# Patient Record
Sex: MALE | Race: BLACK OR AFRICAN AMERICAN | Employment: FULL TIME | URBAN - METROPOLITAN AREA
[De-identification: names, ages, dates, MRNs, and addresses within clinical notes are randomized per-mention and may not be internally consistent; named-entity substitution may affect disease eponyms.]

---

## 2018-02-17 ENCOUNTER — APPOINTMENT (OUTPATIENT)
Dept: GENERAL RADIOLOGY | Facility: HOSPITAL | Age: 33
End: 2018-02-17
Attending: EMERGENCY MEDICINE
Payer: COMMERCIAL

## 2018-02-17 ENCOUNTER — HOSPITAL ENCOUNTER (EMERGENCY)
Facility: HOSPITAL | Age: 33
Discharge: HOME | End: 2018-02-17
Attending: EMERGENCY MEDICINE
Payer: COMMERCIAL

## 2018-02-17 VITALS
DIASTOLIC BLOOD PRESSURE: 74 MMHG | RESPIRATION RATE: 22 BRPM | TEMPERATURE: 98 F | WEIGHT: 263 LBS | HEIGHT: 71 IN | OXYGEN SATURATION: 96 % | BODY MASS INDEX: 36.82 KG/M2 | SYSTOLIC BLOOD PRESSURE: 122 MMHG | HEART RATE: 67 BPM

## 2018-02-17 DIAGNOSIS — R07.89 CHEST PAIN, ATYPICAL: ICD-10-CM

## 2018-02-17 DIAGNOSIS — K21.9 GASTROESOPHAGEAL REFLUX DISEASE, ESOPHAGITIS PRESENCE NOT SPECIFIED: ICD-10-CM

## 2018-02-17 DIAGNOSIS — K52.9 GASTROENTERITIS: Primary | ICD-10-CM

## 2018-02-17 LAB
ALBUMIN SERPL BCP-MCNC: 3.9 G/DL (ref 3.5–4.8)
ALP SERPL-CCNC: 61 U/L (ref 32–100)
ALT SERPL-CCNC: 22 U/L (ref 17–63)
ANION GAP SERPL CALC-SCNC: 10 MMOL/L (ref 0–18)
AST SERPL-CCNC: 25 U/L (ref 15–41)
BASOPHILS # BLD: 0.1 K/UL (ref 0–0.2)
BASOPHILS NFR BLD: 1 %
BILIRUB DIRECT SERPL-MCNC: 0.1 MG/DL (ref 0–0.2)
BILIRUB SERPL-MCNC: 0.6 MG/DL (ref 0.3–1.2)
BUN SERPL-MCNC: 12 MG/DL (ref 8–20)
BUN/CREAT SERPL: 11.1 (ref 10–20)
CALCIUM SERPL-MCNC: 8.9 MG/DL (ref 8.5–10.5)
CHLORIDE SERPL-SCNC: 106 MMOL/L (ref 95–110)
CO2 SERPL-SCNC: 21 MMOL/L (ref 22–32)
CREAT SERPL-MCNC: 1.08 MG/DL (ref 0.5–1.5)
EOSINOPHIL # BLD: 0.2 K/UL (ref 0–0.7)
EOSINOPHIL NFR BLD: 2 %
ERYTHROCYTE [DISTWIDTH] IN BLOOD BY AUTOMATED COUNT: 13.4 % (ref 11–15)
GLUCOSE SERPL-MCNC: 96 MG/DL (ref 70–99)
HCT VFR BLD AUTO: 47.1 % (ref 41–52)
HGB BLD-MCNC: 15.9 G/DL (ref 13.5–17.5)
LIPASE SERPL-CCNC: 21 U/L (ref 22–51)
LYMPHOCYTES # BLD: 1.1 K/UL (ref 1–4)
LYMPHOCYTES NFR BLD: 11 %
MCH RBC QN AUTO: 30.3 PG (ref 27–32)
MCHC RBC AUTO-ENTMCNC: 33.6 G/DL (ref 32–37)
MCV RBC AUTO: 90 FL (ref 80–100)
MONOCYTES # BLD: 0.7 K/UL (ref 0–1)
MONOCYTES NFR BLD: 7 %
NEUTROPHILS # BLD AUTO: 8.3 K/UL (ref 1.8–7.7)
NEUTROPHILS NFR BLD: 80 %
OSMOLALITY UR CALC.SUM OF ELEC: 284 MOSM/KG (ref 275–295)
PLATELET # BLD AUTO: 236 K/UL (ref 140–400)
PMV BLD AUTO: 8.1 FL (ref 7.4–10.3)
POTASSIUM SERPL-SCNC: 3.8 MMOL/L (ref 3.3–5.1)
PROT SERPL-MCNC: 6.7 G/DL (ref 5.9–8.4)
RBC # BLD AUTO: 5.24 M/UL (ref 4.5–5.9)
SODIUM SERPL-SCNC: 137 MMOL/L (ref 136–144)
TROPONIN I SERPL-MCNC: 0 NG/ML (ref ?–0.03)
TROPONIN I SERPL-MCNC: 0 NG/ML (ref ?–0.03)
WBC # BLD AUTO: 10.4 K/UL (ref 4–11)

## 2018-02-17 PROCEDURE — 93005 ELECTROCARDIOGRAM TRACING: CPT

## 2018-02-17 PROCEDURE — 83690 ASSAY OF LIPASE: CPT | Performed by: EMERGENCY MEDICINE

## 2018-02-17 PROCEDURE — 99285 EMERGENCY DEPT VISIT HI MDM: CPT

## 2018-02-17 PROCEDURE — 71046 X-RAY EXAM CHEST 2 VIEWS: CPT | Performed by: EMERGENCY MEDICINE

## 2018-02-17 PROCEDURE — 96360 HYDRATION IV INFUSION INIT: CPT

## 2018-02-17 PROCEDURE — 80076 HEPATIC FUNCTION PANEL: CPT | Performed by: EMERGENCY MEDICINE

## 2018-02-17 PROCEDURE — 93010 ELECTROCARDIOGRAM REPORT: CPT | Performed by: EMERGENCY MEDICINE

## 2018-02-17 PROCEDURE — 96361 HYDRATE IV INFUSION ADD-ON: CPT

## 2018-02-17 PROCEDURE — 84484 ASSAY OF TROPONIN QUANT: CPT | Performed by: EMERGENCY MEDICINE

## 2018-02-17 PROCEDURE — 85025 COMPLETE CBC W/AUTO DIFF WBC: CPT | Performed by: EMERGENCY MEDICINE

## 2018-02-17 PROCEDURE — 80048 BASIC METABOLIC PNL TOTAL CA: CPT | Performed by: EMERGENCY MEDICINE

## 2018-02-17 NOTE — ED INITIAL ASSESSMENT (HPI)
C/o mid upper abd pain that radiates to the left upper chest area on/off x 3 wks. Since last night the \"sharp/poking\" pain has gotten worse. Denies sob. But does c/o nausea with several episodes of vomiting.

## 2018-02-17 NOTE — ED PROVIDER NOTES
Patient Seen in: [facility] AND [facility] Emergency Department    History   Patient presents with:  Abdomen/Flank Pain (GI/)    Stated Complaint: ABD pain    HPI    The patient is a 25-year-old male who presents with 2 weeks of intermittent diarrhea.   Francisco flores motion. Neck supple. No JVD present. Cardiovascular: Normal rate, regular rhythm, normal heart sounds and intact distal pulses. No murmur heard. Pulmonary/Chest: Effort normal and breath sounds normal.   Abdominal: Soft.  Bowel sounds are normal. He e right bundle branch block  Axis: Left           ED Course as of Feb 17 1303  ------------------------------------------------------------       [facility]     Pulse Ox: 96%, Normal, room air    Cardiac Monitor: Pulse Readings from Last 1 Encounters:  02/17/18 : 7

## 2018-08-13 ENCOUNTER — HOSPITAL ENCOUNTER (EMERGENCY)
Facility: HOSPITAL | Age: 33
Discharge: HOME | End: 2018-08-13
Attending: EMERGENCY MEDICINE
Payer: COMMERCIAL

## 2018-08-13 VITALS
RESPIRATION RATE: 14 BRPM | BODY MASS INDEX: 50.06 KG/M2 | OXYGEN SATURATION: 99 % | DIASTOLIC BLOOD PRESSURE: 78 MMHG | WEIGHT: 255 LBS | SYSTOLIC BLOOD PRESSURE: 119 MMHG | TEMPERATURE: 98 F | HEART RATE: 91 BPM | HEIGHT: 60 IN

## 2018-08-13 DIAGNOSIS — L08.9 BACTERIAL SKIN INFECTION: ICD-10-CM

## 2018-08-13 DIAGNOSIS — B96.89 BACTERIAL SKIN INFECTION: ICD-10-CM

## 2018-08-13 DIAGNOSIS — L91.0 KELOID SCAR OF SKIN: Primary | ICD-10-CM

## 2018-08-13 PROCEDURE — 99283 EMERGENCY DEPT VISIT LOW MDM: CPT

## 2018-08-13 PROCEDURE — 93010 ELECTROCARDIOGRAM REPORT: CPT | Performed by: EMERGENCY MEDICINE

## 2018-08-13 PROCEDURE — 93005 ELECTROCARDIOGRAM TRACING: CPT

## 2018-08-13 RX ORDER — ALBUTEROL SULFATE 90 UG/1
AEROSOL, METERED RESPIRATORY (INHALATION) EVERY 6 HOURS PRN
COMMUNITY
End: 2019-12-16

## 2018-08-13 RX ORDER — DOXYCYCLINE HYCLATE 100 MG/1
100 CAPSULE ORAL 2 TIMES DAILY
Qty: 20 CAPSULE | Refills: 0 | Status: SHIPPED | OUTPATIENT
Start: 2018-08-13 | End: 2018-08-23

## 2018-08-13 RX ORDER — PREDNISONE 20 MG/1
20 TABLET ORAL DAILY
COMMUNITY
End: 2019-12-16

## 2018-08-13 RX ORDER — IPRATROPIUM BROMIDE AND ALBUTEROL SULFATE 2.5; .5 MG/3ML; MG/3ML
3 SOLUTION RESPIRATORY (INHALATION) ONCE
Status: DISCONTINUED | OUTPATIENT
Start: 2018-08-13 | End: 2018-08-13

## 2018-08-13 NOTE — ED PROVIDER NOTES
Patient Seen in: [facility] AND [facility] Emergency Department    History   Patient presents with:  Rash Skin Problem (integumentary)    Stated Complaint:     HPI    78-year-old male presents for complaint of a growth on his chest.  This is been present for the Nursing note and vitals reviewed. ED Course   Labs Reviewed - No data to display  EKG    Rate, intervals and axes as noted on EKG Report. Rate: 78  Rhythm: Sinus Rhythm  Reading: RBBB, no stemi, no change from previous.             ED Course as [place] [id number] [phone]      Dermatologist         Medications Prescribed:  Current Discharge Medication List    START taking these medications    Doxycycline Hyclate 100 MG Oral Cap  Take 1 capsule (100 mg total) by mouth 2 (two) times daily.

## 2018-12-17 ENCOUNTER — HOSPITAL ENCOUNTER (EMERGENCY)
Facility: HOSPITAL | Age: 33
Discharge: HOME | End: 2018-12-17
Attending: EMERGENCY MEDICINE
Payer: COMMERCIAL

## 2018-12-17 ENCOUNTER — APPOINTMENT (OUTPATIENT)
Dept: GENERAL RADIOLOGY | Facility: HOSPITAL | Age: 33
End: 2018-12-17
Attending: EMERGENCY MEDICINE
Payer: COMMERCIAL

## 2018-12-17 VITALS
WEIGHT: 265 LBS | RESPIRATION RATE: 18 BRPM | TEMPERATURE: 99 F | DIASTOLIC BLOOD PRESSURE: 69 MMHG | HEART RATE: 80 BPM | BODY MASS INDEX: 37.1 KG/M2 | OXYGEN SATURATION: 97 % | HEIGHT: 71 IN | SYSTOLIC BLOOD PRESSURE: 130 MMHG

## 2018-12-17 DIAGNOSIS — S82.892A CLOSED AVULSION FRACTURE OF LEFT ANKLE, INITIAL ENCOUNTER: Primary | ICD-10-CM

## 2018-12-17 PROCEDURE — 99284 EMERGENCY DEPT VISIT MOD MDM: CPT

## 2018-12-17 PROCEDURE — 73610 X-RAY EXAM OF ANKLE: CPT | Performed by: EMERGENCY MEDICINE

## 2018-12-17 RX ORDER — HYDROCODONE BITARTRATE AND ACETAMINOPHEN 5; 325 MG/1; MG/1
1-2 TABLET ORAL EVERY 4 HOURS PRN
Qty: 15 TABLET | Refills: 0 | Status: SHIPPED | OUTPATIENT
Start: 2018-12-17 | End: 2018-12-24

## 2018-12-17 RX ORDER — HYDROCODONE BITARTRATE AND ACETAMINOPHEN 5; 325 MG/1; MG/1
1 TABLET ORAL ONCE
Status: COMPLETED | OUTPATIENT
Start: 2018-12-17 | End: 2018-12-17

## 2018-12-17 NOTE — ED PROVIDER NOTES
Patient Seen in: [facility] AND [facility] Emergency Department    History   Patient presents with:  Lower Extremity Injury (musculoskeletal)    Stated Complaint:  fell off porch and hurt left leg    HPI    77-year-old male with history of hyperlipidemia here wi Temp 98.7 °F (37.1 °C)   Temp src Oral   SpO2 95 %   O2 Device None (Room air)       Current:/87   Pulse 88   Temp 98.7 °F (37.1 °C) (Oral)   Resp 18   Ht 180.3 cm (5' 11\")   Wt 120.2 kg   SpO2 95%   BMI 36.96 kg/m²         Physical Exam   Constit represent tiny avulsion fractures. There is overlying soft tissue swelling.    -Well-corticated ossicle posterior to the talus could represent an os trigonum or old fracture. The results were faxed/finalized only at 0326 hrs ET.  If you would like to contusion.           Disposition and Plan     Clinical Impression:  Closed avulsion fracture of left ankle, initial encounter  (primary encounter diagnosis)    Disposition:  Discharge  12/17/2018  2:30 am    Follow-up:  Toshia August MD  [address] [place] [address]

## 2019-04-11 ENCOUNTER — HOSPITAL ENCOUNTER (EMERGENCY)
Facility: HOSPITAL | Age: 34
Discharge: HOME | End: 2019-04-11
Payer: COMMERCIAL

## 2019-04-11 VITALS
BODY MASS INDEX: 36.4 KG/M2 | HEIGHT: 71 IN | TEMPERATURE: 98 F | RESPIRATION RATE: 18 BRPM | DIASTOLIC BLOOD PRESSURE: 76 MMHG | WEIGHT: 260 LBS | HEART RATE: 108 BPM | SYSTOLIC BLOOD PRESSURE: 148 MMHG | OXYGEN SATURATION: 94 %

## 2019-04-11 DIAGNOSIS — H60.501 ACUTE OTITIS EXTERNA OF RIGHT EAR, UNSPECIFIED TYPE: Primary | ICD-10-CM

## 2019-04-11 PROCEDURE — 99283 EMERGENCY DEPT VISIT LOW MDM: CPT

## 2019-04-11 PROCEDURE — 82962 GLUCOSE BLOOD TEST: CPT

## 2019-04-11 RX ORDER — NEOMYCIN SULFATE, POLYMYXIN B SULFATE AND HYDROCORTISONE 10; 3.5; 1 MG/ML; MG/ML; [USP'U]/ML
5 SUSPENSION/ DROPS AURICULAR (OTIC) 4 TIMES DAILY
Qty: 1 BOTTLE | Refills: 0 | Status: SHIPPED | OUTPATIENT
Start: 2019-04-11 | End: 2019-04-21

## 2019-04-11 RX ORDER — NEOMYCIN SULFATE, POLYMYXIN B SULFATE AND HYDROCORTISONE 10; 3.5; 1 MG/ML; MG/ML; [USP'U]/ML
5 SUSPENSION/ DROPS AURICULAR (OTIC) 4 TIMES DAILY
Qty: 1 BOTTLE | Refills: 0 | Status: SHIPPED | OUTPATIENT
Start: 2019-04-11 | End: 2019-04-11

## 2019-04-12 NOTE — ED PROVIDER NOTES
Patient Seen in: [facility] AND [facility] Emergency Department    History   CC: ear pain  HPI: Vilinda Burkitt 28year old male  who presents to the ER c/o right ear pain starting yesterday however worsening into today. +congestion.  No cough, sore throat, fev injected, no discharge noted, no periorbital edema  ENT - right EAC w/ clear dx, left EAC without discharge, TM pearly grey with COL visualized appropriately bilaterally. no nasal drainage noted in nares bilat, no cobblestoning to post. Pharynx.    Nahun Lyn

## 2019-12-03 ENCOUNTER — HOSPITAL ENCOUNTER (EMERGENCY)
Facility: HOSPITAL | Age: 34
Discharge: HOME | End: 2019-12-04
Attending: EMERGENCY MEDICINE
Payer: COMMERCIAL

## 2019-12-03 VITALS
RESPIRATION RATE: 18 BRPM | TEMPERATURE: 99 F | OXYGEN SATURATION: 97 % | HEART RATE: 82 BPM | SYSTOLIC BLOOD PRESSURE: 130 MMHG | WEIGHT: 315 LBS | DIASTOLIC BLOOD PRESSURE: 84 MMHG | BODY MASS INDEX: 46 KG/M2

## 2019-12-03 DIAGNOSIS — L91.0 KELOID SCAR OF SKIN: Primary | ICD-10-CM

## 2019-12-03 DIAGNOSIS — L03.313 CELLULITIS OF CHEST WALL: ICD-10-CM

## 2019-12-03 PROCEDURE — 99283 EMERGENCY DEPT VISIT LOW MDM: CPT

## 2019-12-03 RX ORDER — IBUPROFEN 600 MG/1
600 TABLET ORAL ONCE
Status: COMPLETED | OUTPATIENT
Start: 2019-12-03 | End: 2019-12-03

## 2019-12-03 RX ORDER — DOXYCYCLINE HYCLATE 100 MG/1
100 CAPSULE ORAL 2 TIMES DAILY
Qty: 20 CAPSULE | Refills: 0 | Status: SHIPPED | OUTPATIENT
Start: 2019-12-03 | End: 2019-12-13

## 2019-12-04 NOTE — ED INITIAL ASSESSMENT (HPI)
aox3 to ed co of having keloid x 3 days on chest, now pain worsening x 3 days denies fever, went to derm and given steroids, pain worsening

## 2019-12-04 NOTE — ED PROVIDER NOTES
Patient Seen in: [facility] AND [facility] Emergency Department      History   Patient presents with:  Cyst    Stated Complaint: keloid on chest hurts    HPI    Patient is a 57-year-old male who presents to the emergency department with a chief complaint of a pa general surgery for possible further treatment.         MDM                   Disposition and Plan     Clinical Impression:  Keloid scar of skin  (primary encounter diagnosis)  Cellulitis of chest wall    Disposition:  Discharge  12/3/2019 11:39 pm    [employer]

## 2019-12-04 NOTE — ED NOTES
Patient has keloid on his R upper chest since high school. Patient states over the past year it has been growing in size. Over the past couple days the pain has become so severe he is having difficulty lifting his R arm.  Patient states it has been draining

## 2019-12-09 ENCOUNTER — LAB ENCOUNTER (OUTPATIENT)
Dept: LAB | Facility: HOSPITAL | Age: 34
End: 2019-12-09
Attending: SURGERY
Payer: COMMERCIAL

## 2019-12-09 ENCOUNTER — OFFICE VISIT (OUTPATIENT)
Dept: SURGERY | Facility: CLINIC | Age: 34
End: 2019-12-09
Payer: COMMERCIAL

## 2019-12-09 VITALS — WEIGHT: 315 LBS | BODY MASS INDEX: 44.1 KG/M2 | HEIGHT: 71 IN

## 2019-12-09 DIAGNOSIS — R22.1 MASS OF NECK: ICD-10-CM

## 2019-12-09 DIAGNOSIS — R22.2 CHEST MASS: Primary | ICD-10-CM

## 2019-12-09 DIAGNOSIS — R22.2 CHEST MASS: ICD-10-CM

## 2019-12-09 PROCEDURE — 80053 COMPREHEN METABOLIC PANEL: CPT

## 2019-12-09 PROCEDURE — 36415 COLL VENOUS BLD VENIPUNCTURE: CPT

## 2019-12-09 PROCEDURE — 99203 OFFICE O/P NEW LOW 30 MIN: CPT | Performed by: SURGERY

## 2019-12-09 PROCEDURE — 99212 OFFICE O/P EST SF 10 MIN: CPT | Performed by: SURGERY

## 2019-12-09 PROCEDURE — 85025 COMPLETE CBC W/AUTO DIFF WBC: CPT

## 2019-12-09 NOTE — PATIENT INSTRUCTIONS
Obtain preoperative testing.   Plan wide excision of neck and chest mass at [facility] same-day surgery

## 2019-12-09 NOTE — H&P
History and Physical      HPI   Patient presents with: Follow - Up: Patient here for follow up ED visit on 12-3-19. Patient reports abscess of chest cyst.  Patient here for follow up and to schedule surgery. Patient is taking antibiotic, (5 days) .   Sherin Schmidt are clear palate is intact mucous membranes are moist no oral lesions are noted  Neck/Thyroid: neck is supple without adenopathy 3 x 1 cm firm neoplasm near the sternal notch  Respiratory: normal to inspection lungs are clear to auscultation bilaterally no

## 2019-12-09 NOTE — H&P (VIEW-ONLY)
History and Physical      HPI   Patient presents with: Follow - Up: Patient here for follow up ED visit on 12-3-19. Patient reports abscess of chest cyst.  Patient here for follow up and to schedule surgery. Patient is taking antibiotic, (5 days) .   Dakotah Mishra are clear palate is intact mucous membranes are moist no oral lesions are noted  Neck/Thyroid: neck is supple without adenopathy 3 x 1 cm firm neoplasm near the sternal notch  Respiratory: normal to inspection lungs are clear to auscultation bilaterally no

## 2019-12-19 ENCOUNTER — ANESTHESIA EVENT (OUTPATIENT)
Dept: SURGERY | Facility: HOSPITAL | Age: 34
End: 2019-12-19
Payer: COMMERCIAL

## 2019-12-19 ENCOUNTER — ANESTHESIA (OUTPATIENT)
Dept: SURGERY | Facility: HOSPITAL | Age: 34
End: 2019-12-19
Payer: COMMERCIAL

## 2019-12-19 ENCOUNTER — HOSPITAL ENCOUNTER (OUTPATIENT)
Facility: HOSPITAL | Age: 34
Setting detail: HOSPITAL OUTPATIENT SURGERY
Discharge: HOME | End: 2019-12-19
Attending: SURGERY | Admitting: SURGERY
Payer: COMMERCIAL

## 2019-12-19 VITALS
HEIGHT: 71 IN | TEMPERATURE: 98 F | DIASTOLIC BLOOD PRESSURE: 82 MMHG | BODY MASS INDEX: 44.1 KG/M2 | RESPIRATION RATE: 17 BRPM | SYSTOLIC BLOOD PRESSURE: 141 MMHG | WEIGHT: 315 LBS | HEART RATE: 89 BPM | OXYGEN SATURATION: 95 %

## 2019-12-19 DIAGNOSIS — R22.2 CHEST MASS: ICD-10-CM

## 2019-12-19 DIAGNOSIS — R22.1 MASS OF NECK: ICD-10-CM

## 2019-12-19 PROCEDURE — 12044 INTMD RPR N-HF/GENIT7.6-12.5: CPT | Performed by: SURGERY

## 2019-12-19 PROCEDURE — 0JB40ZZ EXCISION OF RIGHT NECK SUBCUTANEOUS TISSUE AND FASCIA, OPEN APPROACH: ICD-10-PCS | Performed by: SURGERY

## 2019-12-19 PROCEDURE — 12035 INTMD RPR S/A/T/EXT 12.6-20: CPT | Performed by: SURGERY

## 2019-12-19 PROCEDURE — 11406 EXC TR-EXT B9+MARG >4.0 CM: CPT | Performed by: SURGERY

## 2019-12-19 PROCEDURE — 11426 EXC H-F-NK-SP B9+MARG >4 CM: CPT | Performed by: SURGERY

## 2019-12-19 PROCEDURE — 0JB60ZZ EXCISION OF CHEST SUBCUTANEOUS TISSUE AND FASCIA, OPEN APPROACH: ICD-10-PCS | Performed by: SURGERY

## 2019-12-19 RX ORDER — HYDROMORPHONE HYDROCHLORIDE 1 MG/ML
0.4 INJECTION, SOLUTION INTRAMUSCULAR; INTRAVENOUS; SUBCUTANEOUS EVERY 5 MIN PRN
Status: DISCONTINUED | OUTPATIENT
Start: 2019-12-19 | End: 2019-12-19

## 2019-12-19 RX ORDER — HYDROCODONE BITARTRATE AND ACETAMINOPHEN 5; 325 MG/1; MG/1
2 TABLET ORAL AS NEEDED
Status: COMPLETED | OUTPATIENT
Start: 2019-12-19 | End: 2019-12-19

## 2019-12-19 RX ORDER — HYDROCODONE BITARTRATE AND ACETAMINOPHEN 5; 325 MG/1; MG/1
1 TABLET ORAL EVERY 4 HOURS PRN
Qty: 20 TABLET | Refills: 0 | Status: ON HOLD | OUTPATIENT
Start: 2019-12-19 | End: 2020-02-28

## 2019-12-19 RX ORDER — METOCLOPRAMIDE 10 MG/1
10 TABLET ORAL ONCE
Status: COMPLETED | OUTPATIENT
Start: 2019-12-19 | End: 2019-12-19

## 2019-12-19 RX ORDER — ACETAMINOPHEN 500 MG
1000 TABLET ORAL ONCE
Status: COMPLETED | OUTPATIENT
Start: 2019-12-19 | End: 2019-12-19

## 2019-12-19 RX ORDER — FAMOTIDINE 20 MG/1
20 TABLET ORAL ONCE
Status: COMPLETED | OUTPATIENT
Start: 2019-12-19 | End: 2019-12-19

## 2019-12-19 RX ORDER — HYDROMORPHONE HYDROCHLORIDE 1 MG/ML
0.2 INJECTION, SOLUTION INTRAMUSCULAR; INTRAVENOUS; SUBCUTANEOUS EVERY 5 MIN PRN
Status: DISCONTINUED | OUTPATIENT
Start: 2019-12-19 | End: 2019-12-19

## 2019-12-19 RX ORDER — DEXAMETHASONE SODIUM PHOSPHATE 4 MG/ML
VIAL (ML) INJECTION AS NEEDED
Status: DISCONTINUED | OUTPATIENT
Start: 2019-12-19 | End: 2019-12-19 | Stop reason: SURG

## 2019-12-19 RX ORDER — SODIUM CHLORIDE, SODIUM LACTATE, POTASSIUM CHLORIDE, CALCIUM CHLORIDE 600; 310; 30; 20 MG/100ML; MG/100ML; MG/100ML; MG/100ML
INJECTION, SOLUTION INTRAVENOUS CONTINUOUS
Status: DISCONTINUED | OUTPATIENT
Start: 2019-12-19 | End: 2019-12-19

## 2019-12-19 RX ORDER — BUPIVACAINE HYDROCHLORIDE 2.5 MG/ML
INJECTION, SOLUTION EPIDURAL; INFILTRATION; INTRACAUDAL AS NEEDED
Status: DISCONTINUED | OUTPATIENT
Start: 2019-12-19 | End: 2019-12-19 | Stop reason: HOSPADM

## 2019-12-19 RX ORDER — MIDAZOLAM HYDROCHLORIDE 1 MG/ML
INJECTION INTRAMUSCULAR; INTRAVENOUS AS NEEDED
Status: DISCONTINUED | OUTPATIENT
Start: 2019-12-19 | End: 2019-12-19 | Stop reason: SURG

## 2019-12-19 RX ORDER — HALOPERIDOL 5 MG/ML
0.25 INJECTION INTRAMUSCULAR ONCE AS NEEDED
Status: DISCONTINUED | OUTPATIENT
Start: 2019-12-19 | End: 2019-12-19

## 2019-12-19 RX ORDER — MORPHINE SULFATE 4 MG/ML
2 INJECTION, SOLUTION INTRAMUSCULAR; INTRAVENOUS EVERY 10 MIN PRN
Status: DISCONTINUED | OUTPATIENT
Start: 2019-12-19 | End: 2019-12-19

## 2019-12-19 RX ORDER — NALOXONE HYDROCHLORIDE 0.4 MG/ML
80 INJECTION, SOLUTION INTRAMUSCULAR; INTRAVENOUS; SUBCUTANEOUS AS NEEDED
Status: DISCONTINUED | OUTPATIENT
Start: 2019-12-19 | End: 2019-12-19

## 2019-12-19 RX ORDER — LIDOCAINE HYDROCHLORIDE 10 MG/ML
INJECTION, SOLUTION EPIDURAL; INFILTRATION; INTRACAUDAL; PERINEURAL AS NEEDED
Status: DISCONTINUED | OUTPATIENT
Start: 2019-12-19 | End: 2019-12-19 | Stop reason: SURG

## 2019-12-19 RX ORDER — ONDANSETRON 2 MG/ML
INJECTION INTRAMUSCULAR; INTRAVENOUS AS NEEDED
Status: DISCONTINUED | OUTPATIENT
Start: 2019-12-19 | End: 2019-12-19 | Stop reason: SURG

## 2019-12-19 RX ORDER — HYDROMORPHONE HYDROCHLORIDE 1 MG/ML
0.6 INJECTION, SOLUTION INTRAMUSCULAR; INTRAVENOUS; SUBCUTANEOUS EVERY 5 MIN PRN
Status: DISCONTINUED | OUTPATIENT
Start: 2019-12-19 | End: 2019-12-19

## 2019-12-19 RX ORDER — ONDANSETRON 2 MG/ML
4 INJECTION INTRAMUSCULAR; INTRAVENOUS ONCE AS NEEDED
Status: DISCONTINUED | OUTPATIENT
Start: 2019-12-19 | End: 2019-12-19

## 2019-12-19 RX ORDER — MORPHINE SULFATE 4 MG/ML
4 INJECTION, SOLUTION INTRAMUSCULAR; INTRAVENOUS EVERY 10 MIN PRN
Status: DISCONTINUED | OUTPATIENT
Start: 2019-12-19 | End: 2019-12-19

## 2019-12-19 RX ORDER — GLYCOPYRROLATE 0.2 MG/ML
INJECTION INTRAMUSCULAR; INTRAVENOUS AS NEEDED
Status: DISCONTINUED | OUTPATIENT
Start: 2019-12-19 | End: 2019-12-19 | Stop reason: SURG

## 2019-12-19 RX ORDER — HYDROCODONE BITARTRATE AND ACETAMINOPHEN 5; 325 MG/1; MG/1
1 TABLET ORAL AS NEEDED
Status: COMPLETED | OUTPATIENT
Start: 2019-12-19 | End: 2019-12-19

## 2019-12-19 RX ORDER — MORPHINE SULFATE 10 MG/ML
6 INJECTION, SOLUTION INTRAMUSCULAR; INTRAVENOUS EVERY 10 MIN PRN
Status: DISCONTINUED | OUTPATIENT
Start: 2019-12-19 | End: 2019-12-19

## 2019-12-19 RX ORDER — PROCHLORPERAZINE EDISYLATE 5 MG/ML
5 INJECTION INTRAMUSCULAR; INTRAVENOUS ONCE AS NEEDED
Status: DISCONTINUED | OUTPATIENT
Start: 2019-12-19 | End: 2019-12-19

## 2019-12-19 RX ADMIN — SODIUM CHLORIDE, SODIUM LACTATE, POTASSIUM CHLORIDE, CALCIUM CHLORIDE: 600; 310; 30; 20 INJECTION, SOLUTION INTRAVENOUS at 11:02:00

## 2019-12-19 RX ADMIN — DEXAMETHASONE SODIUM PHOSPHATE 4 MG: 4 MG/ML VIAL (ML) INJECTION at 10:32:00

## 2019-12-19 RX ADMIN — GLYCOPYRROLATE 0.1 MG: 0.2 INJECTION INTRAMUSCULAR; INTRAVENOUS at 10:24:00

## 2019-12-19 RX ADMIN — MIDAZOLAM HYDROCHLORIDE 2 MG: 1 INJECTION INTRAMUSCULAR; INTRAVENOUS at 10:19:00

## 2019-12-19 RX ADMIN — ONDANSETRON 4 MG: 2 INJECTION INTRAMUSCULAR; INTRAVENOUS at 10:32:00

## 2019-12-19 RX ADMIN — LIDOCAINE HYDROCHLORIDE 50 MG: 10 INJECTION, SOLUTION EPIDURAL; INFILTRATION; INTRACAUDAL; PERINEURAL at 10:24:00

## 2019-12-19 RX ADMIN — SODIUM CHLORIDE, SODIUM LACTATE, POTASSIUM CHLORIDE, CALCIUM CHLORIDE: 600; 310; 30; 20 INJECTION, SOLUTION INTRAVENOUS at 10:19:00

## 2019-12-19 NOTE — BRIEF OP NOTE
Pre-Operative Diagnosis: Chest mass [R22.2]  Mass of neck [R22.1]     Post-Operative Diagnosis: Chest mass [R22. 2]Mass of neck [R22.1]      Procedure Performed:   Procedure(s):  excision mass on chest 20 X 6 cm, excision mass on neck 10x 4, with complex cl

## 2019-12-19 NOTE — INTERVAL H&P NOTE
Pre-op Diagnosis: Chest mass [R22.2]  Mass of neck [R22.1]    The above referenced H&P was reviewed by Alexandre Thompson MD on 12/19/2019, the patient was examined and no significant changes have occurred in the patient's condition since the H&P was performed.

## 2019-12-19 NOTE — ANESTHESIA POSTPROCEDURE EVALUATION
Patient: Jayro Nunez    Procedure Summary     Date:  12/19/19 Room / Location:  [facility] OR 02 / [facility] OR    Anesthesia Start:  7636 Anesthesia Stop:  1989    Procedure:  CYST/MASS EXCISION (N/A Chest) Diagnosis:       Chest mass      Mass o

## 2019-12-19 NOTE — ANESTHESIA PREPROCEDURE EVALUATION
Anesthesia PreOp Note    HPI:     Chun Montes is a 27year old male who presents for preoperative consultation requested by: Estee Lopez MD    Date of Surgery: 12/19/2019    Procedure(s):  CYST/MASS EXCISION  Indication: Chest mass [R22.2] on file        Minutes per session: Not on file      Stress: Not on file    Relationships      Social connections:        Talks on phone: Not on file        Gets together: Not on file        Attends Zoroastrianism service: Not on file        Active member of cl Management: IV analgesics and Oral pain medication  Informed Consent Plan and Risks Discussed With:  Patient  Discussed plan with:  CRNA      I have informed Fannie Oneil of the nature of the anesthetic plan, benefits, risks including possible d

## 2019-12-19 NOTE — ANESTHESIA PROCEDURE NOTES
Airway  Date/Time: 12/19/2019 10:27 AM  Urgency: Elective    Airway not difficult    General Information and Staff    Patient location during procedure: OR  Anesthesiologist: Branden Najera MD  Resident/CRNA: Pamela Tinsley CRNA  Performed: [facility]

## 2019-12-20 NOTE — OPERATIVE REPORT
[facility]    PATIENT'S NAME: Jl Case   ATTENDING PHYSICIAN: Ramona Guardado MD   OPERATING PHYSICIAN: Ramona Guardado MD   PATIENT ACCOUNT#:   [de-identified]    LOCATION:  [place] [address]  MEDICAL RECORD #:   [id number]       DATE

## 2019-12-23 ENCOUNTER — TELEPHONE (OUTPATIENT)
Dept: SURGERY | Facility: CLINIC | Age: 34
End: 2019-12-23

## 2020-01-06 ENCOUNTER — OFFICE VISIT (OUTPATIENT)
Dept: SURGERY | Facility: CLINIC | Age: 35
End: 2020-01-06
Payer: COMMERCIAL

## 2020-01-06 VITALS — WEIGHT: 315 LBS | BODY MASS INDEX: 44.1 KG/M2 | TEMPERATURE: 99 F | HEIGHT: 71 IN

## 2020-01-06 DIAGNOSIS — R10.10 PAIN OF UPPER ABDOMEN: ICD-10-CM

## 2020-01-06 DIAGNOSIS — Z98.890 POST-OPERATIVE STATE: Primary | ICD-10-CM

## 2020-01-06 PROCEDURE — 99213 OFFICE O/P EST LOW 20 MIN: CPT | Performed by: SURGERY

## 2020-01-06 NOTE — PROGRESS NOTES
19 [facility] SURGERY    Progress Note    Elana Trevor Patient Status:  Outpatient    1985 MRN [id number]   Location [address] [place] [address] Attending No att. providers found   Hosp Day # 0 PCP None

## 2020-02-26 ENCOUNTER — HOSPITAL ENCOUNTER (OUTPATIENT)
Age: 35
Discharge: HOME | End: 2020-02-26
Attending: EMERGENCY MEDICINE
Payer: COMMERCIAL

## 2020-02-26 ENCOUNTER — APPOINTMENT (OUTPATIENT)
Dept: CT IMAGING | Facility: HOSPITAL | Age: 35
End: 2020-02-26
Attending: EMERGENCY MEDICINE
Payer: COMMERCIAL

## 2020-02-26 ENCOUNTER — HOSPITAL ENCOUNTER (OUTPATIENT)
Facility: HOSPITAL | Age: 35
Setting detail: OBSERVATION
Discharge: HOME | End: 2020-02-28
Attending: EMERGENCY MEDICINE | Admitting: INTERNAL MEDICINE
Payer: COMMERCIAL

## 2020-02-26 VITALS
OXYGEN SATURATION: 93 % | WEIGHT: 220 LBS | RESPIRATION RATE: 24 BRPM | BODY MASS INDEX: 30.8 KG/M2 | TEMPERATURE: 102 F | DIASTOLIC BLOOD PRESSURE: 60 MMHG | HEART RATE: 124 BPM | HEIGHT: 71 IN | SYSTOLIC BLOOD PRESSURE: 113 MMHG

## 2020-02-26 DIAGNOSIS — R00.0 TACHYCARDIA: ICD-10-CM

## 2020-02-26 DIAGNOSIS — R11.2 NAUSEA VOMITING AND DIARRHEA: ICD-10-CM

## 2020-02-26 DIAGNOSIS — R50.9 FEVER, UNSPECIFIED FEVER CAUSE: ICD-10-CM

## 2020-02-26 DIAGNOSIS — R19.7 NAUSEA VOMITING AND DIARRHEA: ICD-10-CM

## 2020-02-26 DIAGNOSIS — I95.9 TRANSIENT HYPOTENSION: ICD-10-CM

## 2020-02-26 DIAGNOSIS — R50.9 ACUTE FEBRILE ILLNESS: Primary | ICD-10-CM

## 2020-02-26 DIAGNOSIS — R11.2 NAUSEA AND VOMITING, INTRACTABILITY OF VOMITING NOT SPECIFIED, UNSPECIFIED VOMITING TYPE: ICD-10-CM

## 2020-02-26 DIAGNOSIS — R10.9 ABDOMINAL PAIN, ACUTE: Primary | ICD-10-CM

## 2020-02-26 LAB
ALBUMIN SERPL-MCNC: 3.6 G/DL (ref 3.4–5)
ALP LIVER SERPL-CCNC: 91 U/L (ref 45–117)
ALT SERPL-CCNC: 45 U/L (ref 16–61)
ANION GAP SERPL CALC-SCNC: 7 MMOL/L (ref 0–18)
AST SERPL-CCNC: 34 U/L (ref 15–37)
BASOPHILS # BLD AUTO: 0.02 X10(3) UL (ref 0–0.2)
BASOPHILS NFR BLD AUTO: 0.2 %
BILIRUB DIRECT SERPL-MCNC: 0.1 MG/DL (ref 0–0.2)
BILIRUB SERPL-MCNC: 0.6 MG/DL (ref 0.1–2)
BUN BLD-MCNC: 16 MG/DL (ref 7–18)
BUN/CREAT SERPL: 11.3 (ref 10–20)
CALCIUM BLD-MCNC: 8.7 MG/DL (ref 8.5–10.1)
CHLORIDE SERPL-SCNC: 105 MMOL/L (ref 98–112)
CO2 SERPL-SCNC: 26 MMOL/L (ref 21–32)
CREAT BLD-MCNC: 1.42 MG/DL (ref 0.7–1.3)
DEPRECATED RDW RBC AUTO: 42.9 FL (ref 35.1–46.3)
EOSINOPHIL # BLD AUTO: 0.07 X10(3) UL (ref 0–0.7)
EOSINOPHIL NFR BLD AUTO: 0.7 %
ERYTHROCYTE [DISTWIDTH] IN BLOOD BY AUTOMATED COUNT: 13.4 % (ref 11–15)
GLUCOSE BLD-MCNC: 90 MG/DL (ref 70–99)
HCT VFR BLD AUTO: 46.6 % (ref 39–53)
HGB BLD-MCNC: 15.6 G/DL (ref 13–17.5)
IMM GRANULOCYTES # BLD AUTO: 0.05 X10(3) UL (ref 0–1)
IMM GRANULOCYTES NFR BLD: 0.5 %
LACTATE SERPL-SCNC: 1.7 MMOL/L (ref 0.4–2)
LIPASE SERPL-CCNC: 60 U/L (ref 73–393)
LYMPHOCYTES # BLD AUTO: 0.82 X10(3) UL (ref 1–4)
LYMPHOCYTES NFR BLD AUTO: 8.3 %
M PROTEIN MFR SERPL ELPH: 7.4 G/DL (ref 6.4–8.2)
MCH RBC QN AUTO: 29.4 PG (ref 26–34)
MCHC RBC AUTO-ENTMCNC: 33.5 G/DL (ref 31–37)
MCV RBC AUTO: 87.8 FL (ref 80–100)
MONOCYTES # BLD AUTO: 0.45 X10(3) UL (ref 0.1–1)
MONOCYTES NFR BLD AUTO: 4.6 %
NEUTROPHILS # BLD AUTO: 8.44 X10 (3) UL (ref 1.5–7.7)
NEUTROPHILS # BLD AUTO: 8.44 X10(3) UL (ref 1.5–7.7)
NEUTROPHILS NFR BLD AUTO: 85.7 %
NT-PROBNP SERPL-MCNC: 12 PG/ML (ref ?–125)
OSMOLALITY SERPL CALC.SUM OF ELEC: 287 MOSM/KG (ref 275–295)
PLATELET # BLD AUTO: 270 10(3)UL (ref 150–450)
POTASSIUM SERPL-SCNC: 4.1 MMOL/L (ref 3.5–5.1)
PROCALCITONIN SERPL-MCNC: 0.48 NG/ML
RBC # BLD AUTO: 5.31 X10(6)UL (ref 4.3–5.7)
SODIUM SERPL-SCNC: 138 MMOL/L (ref 136–145)
TROPONIN I SERPL-MCNC: <0.045 NG/ML (ref ?–0.04)
WBC # BLD AUTO: 9.9 X10(3) UL (ref 4–11)

## 2020-02-26 PROCEDURE — 84484 ASSAY OF TROPONIN QUANT: CPT | Performed by: EMERGENCY MEDICINE

## 2020-02-26 PROCEDURE — 93005 ELECTROCARDIOGRAM TRACING: CPT

## 2020-02-26 PROCEDURE — 83605 ASSAY OF LACTIC ACID: CPT | Performed by: EMERGENCY MEDICINE

## 2020-02-26 PROCEDURE — 96374 THER/PROPH/DIAG INJ IV PUSH: CPT

## 2020-02-26 PROCEDURE — 85025 COMPLETE CBC W/AUTO DIFF WBC: CPT | Performed by: EMERGENCY MEDICINE

## 2020-02-26 PROCEDURE — 99212 OFFICE O/P EST SF 10 MIN: CPT

## 2020-02-26 PROCEDURE — C9113 INJ PANTOPRAZOLE SODIUM, VIA: HCPCS | Performed by: EMERGENCY MEDICINE

## 2020-02-26 PROCEDURE — 83880 ASSAY OF NATRIURETIC PEPTIDE: CPT | Performed by: EMERGENCY MEDICINE

## 2020-02-26 PROCEDURE — 96375 TX/PRO/DX INJ NEW DRUG ADDON: CPT

## 2020-02-26 PROCEDURE — 83690 ASSAY OF LIPASE: CPT | Performed by: EMERGENCY MEDICINE

## 2020-02-26 PROCEDURE — 80048 BASIC METABOLIC PNL TOTAL CA: CPT | Performed by: EMERGENCY MEDICINE

## 2020-02-26 PROCEDURE — 93010 ELECTROCARDIOGRAM REPORT: CPT | Performed by: EMERGENCY MEDICINE

## 2020-02-26 PROCEDURE — 96361 HYDRATE IV INFUSION ADD-ON: CPT

## 2020-02-26 PROCEDURE — 71260 CT THORAX DX C+: CPT | Performed by: EMERGENCY MEDICINE

## 2020-02-26 PROCEDURE — 99213 OFFICE O/P EST LOW 20 MIN: CPT

## 2020-02-26 PROCEDURE — 84145 PROCALCITONIN (PCT): CPT | Performed by: EMERGENCY MEDICINE

## 2020-02-26 PROCEDURE — 99285 EMERGENCY DEPT VISIT HI MDM: CPT

## 2020-02-26 PROCEDURE — 74177 CT ABD & PELVIS W/CONTRAST: CPT | Performed by: EMERGENCY MEDICINE

## 2020-02-26 PROCEDURE — 80076 HEPATIC FUNCTION PANEL: CPT | Performed by: EMERGENCY MEDICINE

## 2020-02-26 RX ORDER — MULTIVIT-MIN/FOLIC/VIT K/LYCOP 400-300MCG
1 TABLET ORAL
COMMUNITY

## 2020-02-26 RX ORDER — ONDANSETRON 2 MG/ML
4 INJECTION INTRAMUSCULAR; INTRAVENOUS ONCE
Status: COMPLETED | OUTPATIENT
Start: 2020-02-26 | End: 2020-02-26

## 2020-02-26 RX ORDER — SODIUM CHLORIDE 9 MG/ML
1000 INJECTION, SOLUTION INTRAVENOUS ONCE
Status: COMPLETED | OUTPATIENT
Start: 2020-02-26 | End: 2020-02-27

## 2020-02-26 RX ORDER — ACETAMINOPHEN 500 MG
1000 TABLET ORAL ONCE
Status: COMPLETED | OUTPATIENT
Start: 2020-02-26 | End: 2020-02-26

## 2020-02-27 LAB
ADENOVIRUS F 40/41 PCR: NEGATIVE
ANION GAP SERPL CALC-SCNC: 5 MMOL/L (ref 0–18)
ASTROVIRUS PCR: NEGATIVE
BASOPHILS # BLD AUTO: 0.03 X10(3) UL (ref 0–0.2)
BASOPHILS NFR BLD AUTO: 0.4 %
BILIRUB UR QL: NEGATIVE
BUN BLD-MCNC: 18 MG/DL (ref 7–18)
BUN/CREAT SERPL: 13.6 (ref 10–20)
C CAYETANENSIS DNA SPEC QL NAA+PROBE: NEGATIVE
C DIFF TOX B STL QL: NEGATIVE
CALCIUM BLD-MCNC: 8 MG/DL (ref 8.5–10.1)
CAMPY SP DNA.DIARRHEA STL QL NAA+PROBE: NEGATIVE
CHLORIDE SERPL-SCNC: 108 MMOL/L (ref 98–112)
CLARITY UR: CLEAR
CO2 SERPL-SCNC: 27 MMOL/L (ref 21–32)
COLOR UR: YELLOW
CREAT BLD-MCNC: 1.32 MG/DL (ref 0.7–1.3)
CRYPTOSP DNA SPEC QL NAA+PROBE: NEGATIVE
DEPRECATED RDW RBC AUTO: 44 FL (ref 35.1–46.3)
EAEC PAA PLAS AGGR+AATA ST NAA+NON-PRB: NEGATIVE
EC STX1+STX2 + H7 FLIC SPEC NAA+PROBE: NEGATIVE
ENTAMOEBA HISTOLYTICA PCR: NEGATIVE
EOSINOPHIL # BLD AUTO: 0.04 X10(3) UL (ref 0–0.7)
EOSINOPHIL NFR BLD AUTO: 0.5 %
EPEC EAE GENE STL QL NAA+NON-PROBE: NEGATIVE
ERYTHROCYTE [DISTWIDTH] IN BLOOD BY AUTOMATED COUNT: 13.7 % (ref 11–15)
ETEC LTA+ST1A+ST1B TOX ST NAA+NON-PROBE: NEGATIVE
GIARDIA LAMBLIA PCR: NEGATIVE
GLUCOSE BLD-MCNC: 94 MG/DL (ref 70–99)
GLUCOSE UR-MCNC: NEGATIVE MG/DL
HCT VFR BLD AUTO: 43 % (ref 39–53)
HGB BLD-MCNC: 14.3 G/DL (ref 13–17.5)
HGB UR QL STRIP.AUTO: NEGATIVE
IMM GRANULOCYTES # BLD AUTO: 0.03 X10(3) UL (ref 0–1)
IMM GRANULOCYTES NFR BLD: 0.4 %
KETONES UR-MCNC: NEGATIVE MG/DL
LEUKOCYTE ESTERASE UR QL STRIP.AUTO: NEGATIVE
LYMPHOCYTES # BLD AUTO: 1.23 X10(3) UL (ref 1–4)
LYMPHOCYTES NFR BLD AUTO: 16.6 %
MCH RBC QN AUTO: 29.2 PG (ref 26–34)
MCHC RBC AUTO-ENTMCNC: 33.3 G/DL (ref 31–37)
MCV RBC AUTO: 87.9 FL (ref 80–100)
MONOCYTES # BLD AUTO: 0.61 X10(3) UL (ref 0.1–1)
MONOCYTES NFR BLD AUTO: 8.3 %
NEUTROPHILS # BLD AUTO: 5.45 X10 (3) UL (ref 1.5–7.7)
NEUTROPHILS # BLD AUTO: 5.45 X10(3) UL (ref 1.5–7.7)
NEUTROPHILS NFR BLD AUTO: 73.8 %
NITRITE UR QL STRIP.AUTO: NEGATIVE
NOROVIRUS GI/GII PCR: POSITIVE
OSMOLALITY SERPL CALC.SUM OF ELEC: 292 MOSM/KG (ref 275–295)
P SHIGELLOIDES DNA STL QL NAA+PROBE: NEGATIVE
PH UR: 5 [PH] (ref 5–8)
PLATELET # BLD AUTO: 229 10(3)UL (ref 150–450)
POTASSIUM SERPL-SCNC: 3.6 MMOL/L (ref 3.5–5.1)
POTASSIUM SERPL-SCNC: 3.8 MMOL/L (ref 3.5–5.1)
PROT UR-MCNC: NEGATIVE MG/DL
RBC # BLD AUTO: 4.89 X10(6)UL (ref 4.3–5.7)
ROTAVIRUS A PCR: NEGATIVE
SALMONELLA DNA SPEC QL NAA+PROBE: NEGATIVE
SAPOVIRUS PCR: NEGATIVE
SHIGELLA SP+EIEC IPAH ST NAA+NON-PROBE: NEGATIVE
SODIUM SERPL-SCNC: 140 MMOL/L (ref 136–145)
UROBILINOGEN UR STRIP-ACNC: 2
V CHOLERAE DNA SPEC QL NAA+PROBE: NEGATIVE
VIBRIO DNA SPEC NAA+PROBE: NEGATIVE
WBC # BLD AUTO: 7.4 X10(3) UL (ref 4–11)
YERSINIA DNA SPEC NAA+PROBE: NEGATIVE

## 2020-02-27 PROCEDURE — 96372 THER/PROPH/DIAG INJ SC/IM: CPT

## 2020-02-27 PROCEDURE — 96366 THER/PROPH/DIAG IV INF ADDON: CPT

## 2020-02-27 PROCEDURE — 96375 TX/PRO/DX INJ NEW DRUG ADDON: CPT

## 2020-02-27 PROCEDURE — 87493 C DIFF AMPLIFIED PROBE: CPT | Performed by: INTERNAL MEDICINE

## 2020-02-27 PROCEDURE — 96365 THER/PROPH/DIAG IV INF INIT: CPT

## 2020-02-27 PROCEDURE — 81003 URINALYSIS AUTO W/O SCOPE: CPT | Performed by: EMERGENCY MEDICINE

## 2020-02-27 PROCEDURE — 87507 IADNA-DNA/RNA PROBE TQ 12-25: CPT | Performed by: INTERNAL MEDICINE

## 2020-02-27 PROCEDURE — 96361 HYDRATE IV INFUSION ADD-ON: CPT

## 2020-02-27 PROCEDURE — 84132 ASSAY OF SERUM POTASSIUM: CPT | Performed by: INTERNAL MEDICINE

## 2020-02-27 PROCEDURE — 96376 TX/PRO/DX INJ SAME DRUG ADON: CPT

## 2020-02-27 PROCEDURE — 85025 COMPLETE CBC W/AUTO DIFF WBC: CPT | Performed by: INTERNAL MEDICINE

## 2020-02-27 PROCEDURE — 80048 BASIC METABOLIC PNL TOTAL CA: CPT | Performed by: INTERNAL MEDICINE

## 2020-02-27 RX ORDER — POTASSIUM CHLORIDE 20 MEQ/1
40 TABLET, EXTENDED RELEASE ORAL EVERY 4 HOURS
Status: COMPLETED | OUTPATIENT
Start: 2020-02-27 | End: 2020-02-27

## 2020-02-27 RX ORDER — LEVOFLOXACIN 5 MG/ML
500 INJECTION, SOLUTION INTRAVENOUS EVERY 24 HOURS
Status: DISCONTINUED | OUTPATIENT
Start: 2020-02-27 | End: 2020-02-27 | Stop reason: DRUGHIGH

## 2020-02-27 RX ORDER — ONDANSETRON 2 MG/ML
4 INJECTION INTRAMUSCULAR; INTRAVENOUS EVERY 4 HOURS PRN
Status: DISCONTINUED | OUTPATIENT
Start: 2020-02-27 | End: 2020-02-28

## 2020-02-27 RX ORDER — ENOXAPARIN SODIUM 100 MG/ML
30 INJECTION SUBCUTANEOUS 2 TIMES DAILY
Status: DISCONTINUED | OUTPATIENT
Start: 2020-02-27 | End: 2020-02-28

## 2020-02-27 RX ORDER — POTASSIUM CHLORIDE 20 MEQ/1
40 TABLET, EXTENDED RELEASE ORAL ONCE
Status: COMPLETED | OUTPATIENT
Start: 2020-02-27 | End: 2020-02-27

## 2020-02-27 RX ORDER — CIPROFLOXACIN 2 MG/ML
400 INJECTION, SOLUTION INTRAVENOUS EVERY 12 HOURS
Status: DISCONTINUED | OUTPATIENT
Start: 2020-02-27 | End: 2020-02-28

## 2020-02-27 RX ORDER — TOBRAMYCIN 3 MG/ML
1 SOLUTION/ DROPS OPHTHALMIC
Status: DISCONTINUED | OUTPATIENT
Start: 2020-02-27 | End: 2020-02-28

## 2020-02-27 RX ORDER — SODIUM CHLORIDE 9 MG/ML
INJECTION, SOLUTION INTRAVENOUS CONTINUOUS
Status: DISCONTINUED | OUTPATIENT
Start: 2020-02-27 | End: 2020-02-28

## 2020-02-27 RX ORDER — ACETAMINOPHEN 325 MG/1
650 TABLET ORAL EVERY 6 HOURS PRN
Status: DISCONTINUED | OUTPATIENT
Start: 2020-02-27 | End: 2020-02-28

## 2020-02-27 RX ORDER — METRONIDAZOLE 500 MG/100ML
500 INJECTION, SOLUTION INTRAVENOUS EVERY 8 HOURS
Status: DISCONTINUED | OUTPATIENT
Start: 2020-02-27 | End: 2020-02-28

## 2020-02-27 NOTE — H&P
[facility]    PATIENT'S NAME: Luz Elena Macdonald   ATTENDING PHYSICIAN: Jerica Jernigan MD   PATIENT ACCOUNT#:   [id number]    LOCATION:  [address] [place] [address] #:   [id number]       YOB: 1985  ADMISSION DATE: edema.      LABORATORY DATA:  Labs were reviewed. WBC 9.9, hemoglobin 15.6. Lactic acid level was 1.0. Procalcitonin of 0.48. Potassium of 3.6. CT scan of the abdomen and chest reviewed. ASSESSMENT AND PLAN:    1.    Right-sided abdominal pain w

## 2020-02-27 NOTE — ED INITIAL ASSESSMENT (HPI)
Pt to ED with c/o N/V/D and fever that started this am. Pt states his children have been sick at home as well. Pt to ED from [address] [place] [address]. Pt c/o right to mid abdominal pain.

## 2020-02-27 NOTE — PROGRESS NOTES
[facility] Pharmacy Note:  Renal Adjustment for levofloxacin [facility])    Miki Caruso is a 27year old male who has been prescribed levofloxacin (LEVAQUIN) 500 mg IV every 24 hrs.   CrCl is estimated creatinine clearance is 83.2 mL/min (A) (based on S

## 2020-02-27 NOTE — PROGRESS NOTES
[facility] Pharmacy Note:  Anticoagulation Weight Dose Adjustment for enoxaparin (LOVENOX)    Elia Carrillo is a 27year old male who has been prescribed enoxaparin (LOVENOX) 40 mg every 24 hours.       Estimated Creatinine Clearance: 83.2 mL/min (A) (ba

## 2020-02-27 NOTE — PLAN OF CARE
Problem: Patient Centered Care  Goal: Patient preferences are identified and integrated in the patient's plan of care  Description  Interventions:  - What would you like us to know as we care for you? I live with my wife and 3 small kids.   - Provide time control medications as ordered  - Initiate emergency measures for life threatening arrhythmias  - Monitor electrolytes and administer replacement therapy as ordered  Outcome: Progressing     Problem: GASTROINTESTINAL - ADULT  Goal: Minimal or absence of na

## 2020-02-27 NOTE — PLAN OF CARE
Problem: Patient Centered Care  Goal: Patient preferences are identified and integrated in the patient's plan of care  Description  Interventions:  - What would you like us to know as we care for you?  From home with family  - Provide timely, complete, an medications as ordered  - Initiate emergency measures for life threatening arrhythmias  - Monitor electrolytes and administer replacement therapy as ordered  Outcome: Progressing     Problem: GASTROINTESTINAL - ADULT  Goal: Minimal or absence of nausea and

## 2020-02-27 NOTE — ED PROVIDER NOTES
Patient presents with:  Nausea/Vomiting/Diarrhea      HPI:     Cristal Larson is a 27year old male who presents with a chief complaint of abdominal pain, nausea, vomiting, or diarrhea, and a fever that all started this morning.   The patient states he is unable on file      Stress: Not on file    Relationships      Social connections:        Talks on phone: Not on file        Gets together: Not on file        Attends Episcopalian service: Not on file        Active member of club or organization: Not on file        A him to the emergency department for further evaluation. He agrees with the plan of care discomfortable drive himself to [facility].    Diagnosis:    ICD-10-CM    1. Abdominal pain, acute R10.9    2. Fever, unspecified fever cause R50.9    3.  Nausea and vomi

## 2020-02-27 NOTE — ED PROVIDER NOTES
Patient Seen in: [facility] AND [facility] Emergency Department    History   Patient presents with:  Nausea/Vomiting/Diarrhea    Stated Complaint: fever n/v/d     HPI    42-year-old male with past medical history of dyslipidemia without prior abdominal surgeries p signs reviewed. All other systems reviewed and negative except as noted above. PSFH elements reviewed from today and agreed except as otherwise stated in HPI.     Physical Exam     ED Triage Vitals [02/26/20 1944]   BP 93/60   Pulse (!) 139   Resp 1 ---------                               -----------         ------                     CBC W/ DIFFERENTIAL[[id number]]          Abnormal            Final result                 Please view results for these tests on the individual orders.    URINALYSIS WIT vessel disease. There is mild bibasilar atelectasis. No pneumothorax. No consolidation. No pleural effusion. The trachea and central airways are unremarkable. Ill-defined ground-glass airspace opacities are seen within the bilateral lower lobes.   Area o Radiology Data Registry) which includes the Dose Index Registry. FINDINGS:  LIVER: Diffuse low attenuation seen throughout the liver compatible with fatty infiltration. GALLBLADDER: Normal wall thickness. No pericholecystic fluid.  BILIARY: No intra-or ex steatosis. Mild wall thickening of the distal esophagus at the gastroesophageal junction may be related to esophagitis. If clinically indicated, further evaluation with direct visualization could be performed.      Dictated by (CST): Torey Carpenter MD on 2/2

## 2020-02-28 VITALS
BODY MASS INDEX: 41.97 KG/M2 | HEIGHT: 71 IN | HEART RATE: 88 BPM | WEIGHT: 299.81 LBS | DIASTOLIC BLOOD PRESSURE: 74 MMHG | TEMPERATURE: 98 F | RESPIRATION RATE: 18 BRPM | SYSTOLIC BLOOD PRESSURE: 126 MMHG | OXYGEN SATURATION: 96 %

## 2020-02-28 LAB
ANION GAP SERPL CALC-SCNC: 4 MMOL/L (ref 0–18)
BASOPHILS # BLD AUTO: 0.05 X10(3) UL (ref 0–0.2)
BASOPHILS NFR BLD AUTO: 0.8 %
BUN BLD-MCNC: 10 MG/DL (ref 7–18)
BUN/CREAT SERPL: 9.3 (ref 10–20)
CALCIUM BLD-MCNC: 7.8 MG/DL (ref 8.5–10.1)
CHLORIDE SERPL-SCNC: 109 MMOL/L (ref 98–112)
CO2 SERPL-SCNC: 25 MMOL/L (ref 21–32)
CREAT BLD-MCNC: 1.08 MG/DL (ref 0.7–1.3)
DEPRECATED RDW RBC AUTO: 43.6 FL (ref 35.1–46.3)
EOSINOPHIL # BLD AUTO: 0.26 X10(3) UL (ref 0–0.7)
EOSINOPHIL NFR BLD AUTO: 4.2 %
ERYTHROCYTE [DISTWIDTH] IN BLOOD BY AUTOMATED COUNT: 13.4 % (ref 11–15)
GLUCOSE BLD-MCNC: 88 MG/DL (ref 70–99)
HCT VFR BLD AUTO: 41.9 % (ref 39–53)
HGB BLD-MCNC: 13.8 G/DL (ref 13–17.5)
IMM GRANULOCYTES # BLD AUTO: 0.04 X10(3) UL (ref 0–1)
IMM GRANULOCYTES NFR BLD: 0.6 %
LYMPHOCYTES # BLD AUTO: 1.84 X10(3) UL (ref 1–4)
LYMPHOCYTES NFR BLD AUTO: 29.7 %
MCH RBC QN AUTO: 29.1 PG (ref 26–34)
MCHC RBC AUTO-ENTMCNC: 32.9 G/DL (ref 31–37)
MCV RBC AUTO: 88.4 FL (ref 80–100)
MONOCYTES # BLD AUTO: 0.76 X10(3) UL (ref 0.1–1)
MONOCYTES NFR BLD AUTO: 12.3 %
NEUTROPHILS # BLD AUTO: 3.24 X10 (3) UL (ref 1.5–7.7)
NEUTROPHILS # BLD AUTO: 3.24 X10(3) UL (ref 1.5–7.7)
NEUTROPHILS NFR BLD AUTO: 52.4 %
OSMOLALITY SERPL CALC.SUM OF ELEC: 284 MOSM/KG (ref 275–295)
PLATELET # BLD AUTO: 225 10(3)UL (ref 150–450)
POTASSIUM SERPL-SCNC: 4.1 MMOL/L (ref 3.5–5.1)
RBC # BLD AUTO: 4.74 X10(6)UL (ref 4.3–5.7)
SODIUM SERPL-SCNC: 138 MMOL/L (ref 136–145)
WBC # BLD AUTO: 6.2 X10(3) UL (ref 4–11)

## 2020-02-28 PROCEDURE — 80048 BASIC METABOLIC PNL TOTAL CA: CPT | Performed by: INTERNAL MEDICINE

## 2020-02-28 PROCEDURE — 96361 HYDRATE IV INFUSION ADD-ON: CPT

## 2020-02-28 PROCEDURE — 85025 COMPLETE CBC W/AUTO DIFF WBC: CPT | Performed by: INTERNAL MEDICINE

## 2020-02-28 PROCEDURE — 96376 TX/PRO/DX INJ SAME DRUG ADON: CPT

## 2020-02-28 RX ORDER — CIPROFLOXACIN 500 MG/1
500 TABLET, FILM COATED ORAL 2 TIMES DAILY
Qty: 14 TABLET | Refills: 0 | Status: SHIPPED | OUTPATIENT
Start: 2020-02-28 | End: 2020-03-04

## 2020-02-28 RX ORDER — METRONIDAZOLE 500 MG/1
500 TABLET ORAL 3 TIMES DAILY
Qty: 15 TABLET | Refills: 0 | Status: SHIPPED | OUTPATIENT
Start: 2020-02-28 | End: 2020-03-04

## 2020-02-28 NOTE — PLAN OF CARE
Problem: Patient Centered Care  Goal: Patient preferences are identified and integrated in the patient's plan of care  Description  Interventions:  - What would you like us to know as we care for you?  I have 3 young children.  - Provide timely, complete, medications as ordered  - Initiate emergency measures for life threatening arrhythmias  - Monitor electrolytes and administer replacement therapy as ordered  Outcome: Progressing     Problem: GASTROINTESTINAL - ADULT  Goal: Minimal or absence of nausea and

## 2020-02-28 NOTE — PLAN OF CARE
No complaints of pain. VS taken this morning. Refused antibiotics this morning due to early discharge. Discharge papers given and education provided. IV removed. Family at the bedside.      Problem: Patient Centered Care  Goal: Patient preferences are ident arrhythmias or at baseline  Description  INTERVENTIONS:  - Continuous cardiac monitoring, monitor vital signs, obtain 12 lead EKG if indicated  - Evaluate effectiveness of antiarrhythmic and heart rate control medications as ordered  - Initiate emergency m

## 2020-03-02 ENCOUNTER — PATIENT OUTREACH (OUTPATIENT)
Dept: CASE MANAGEMENT | Age: 35
End: 2020-03-02

## 2020-03-02 DIAGNOSIS — R50.9 ACUTE FEBRILE ILLNESS: ICD-10-CM

## 2020-03-02 DIAGNOSIS — Z02.9 ENCOUNTERS FOR UNSPECIFIED ADMINISTRATIVE PURPOSE: ICD-10-CM

## 2020-03-02 PROCEDURE — G8483 FLU IMM NO ADMIN DOC REA: HCPCS | Performed by: INTERNAL MEDICINE

## 2020-03-02 PROCEDURE — 1111F DSCHRG MED/CURRENT MED MERGE: CPT

## 2020-03-02 NOTE — PROGRESS NOTES
NCM attempted to contact patient for hospital follow up and to assist in setting patient up with a PPD PCP. LMTCB. NCM contact information provided.

## 2020-03-02 NOTE — PROGRESS NOTES
Patient called right back. Initial Post Discharge Follow Up   Discharge Date: 2/28/20  Contact Date: 3/2/2020    Consent Verification:  Assessment Completed With: Patient  HIPAA Verified?   Yes    Discharge Dx:  Acute febrile illness  Nausea vomiting medication was prescribed:    o Was the new medication’s purpose & side effects reviewed? yes  o Do you have any questions about your new medication?  No  • Did you  your discharge medications when you left the hospital? Yes  • May I go over your med

## 2020-03-27 ENCOUNTER — HOSPITAL ENCOUNTER (OUTPATIENT)
Age: 35
Discharge: HOME | End: 2020-03-27
Attending: EMERGENCY MEDICINE
Payer: COMMERCIAL

## 2020-03-27 VITALS
HEIGHT: 71 IN | OXYGEN SATURATION: 96 % | DIASTOLIC BLOOD PRESSURE: 86 MMHG | RESPIRATION RATE: 22 BRPM | WEIGHT: 300 LBS | HEART RATE: 102 BPM | BODY MASS INDEX: 42 KG/M2 | TEMPERATURE: 99 F | SYSTOLIC BLOOD PRESSURE: 131 MMHG

## 2020-03-27 DIAGNOSIS — S61.211A LACERATION OF LEFT INDEX FINGER WITHOUT FOREIGN BODY WITHOUT DAMAGE TO NAIL, INITIAL ENCOUNTER: Primary | ICD-10-CM

## 2020-03-27 PROCEDURE — 99212 OFFICE O/P EST SF 10 MIN: CPT

## 2020-03-27 NOTE — ED PROVIDER NOTES
Patient presents with:  Laceration Abrasion      HPI:     Elia Carrillo is a 27year old male presents for a chief complaint of a laceration to the lateral aspect of the left pointer digit.   The patient cut his finger while cutting potatoes prior Gets together: Not on file        Attends Amish service: Not on file        Active member of club or organization: Not on file        Attends meetings of clubs or organizations: Not on file        Relationship status: Not on file      Intimate par were placed in this encounter. Labs performed this visit:  No results found for this or any previous visit (from the past 10 hour(s)). MDM:  The laceration was repaired with a Steri-Strip. Patient I discussed wound care.   We discussed close follow

## 2020-03-27 NOTE — ED INITIAL ASSESSMENT (HPI)
Pt here with a laceration to left 2nd finger, pt states he was slicing potatoes this morning and his left second finger ( tip of it) got in the way of the knife, pt has some bleeding, pt states he has a little numbness, pt has full movement of left 2nd fin

## 2020-04-02 NOTE — DISCHARGE SUMMARY
[facility]    PATIENT'S NAME: Priscilla Nance   ATTENDING PHYSICIAN: Trupti Maya MD   PATIENT ACCOUNT#:   [id number]    LOCATION:  [address] [place] [address] #:   [id number]       YOB: 1985  ADMISSION DATE:

## 2020-06-12 ENCOUNTER — HOSPITAL ENCOUNTER (EMERGENCY)
Facility: HOSPITAL | Age: 35
Discharge: HOME | End: 2020-06-12
Attending: EMERGENCY MEDICINE
Payer: COMMERCIAL

## 2020-06-12 VITALS
HEIGHT: 71 IN | DIASTOLIC BLOOD PRESSURE: 80 MMHG | TEMPERATURE: 98 F | SYSTOLIC BLOOD PRESSURE: 128 MMHG | WEIGHT: 280 LBS | OXYGEN SATURATION: 97 % | RESPIRATION RATE: 18 BRPM | HEART RATE: 93 BPM | BODY MASS INDEX: 39.2 KG/M2

## 2020-06-12 DIAGNOSIS — S99.922A INJURY OF TOENAIL OF LEFT FOOT, INITIAL ENCOUNTER: Primary | ICD-10-CM

## 2020-06-12 PROCEDURE — 99282 EMERGENCY DEPT VISIT SF MDM: CPT
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
## 2020-06-24 ENCOUNTER — APPOINTMENT (OUTPATIENT)
Dept: CT IMAGING | Facility: HOSPITAL | Age: 35
End: 2020-06-24
Attending: PHYSICIAN ASSISTANT
Payer: COMMERCIAL

## 2020-06-24 ENCOUNTER — HOSPITAL ENCOUNTER (EMERGENCY)
Facility: HOSPITAL | Age: 35
Discharge: HOME | End: 2020-06-24
Attending: PHYSICIAN ASSISTANT
Payer: COMMERCIAL

## 2020-06-24 VITALS
DIASTOLIC BLOOD PRESSURE: 76 MMHG | HEIGHT: 71 IN | TEMPERATURE: 98 F | RESPIRATION RATE: 18 BRPM | BODY MASS INDEX: 44.1 KG/M2 | HEART RATE: 80 BPM | SYSTOLIC BLOOD PRESSURE: 108 MMHG | OXYGEN SATURATION: 97 % | WEIGHT: 315 LBS

## 2020-06-24 DIAGNOSIS — R19.7 DIARRHEA, UNSPECIFIED TYPE: ICD-10-CM

## 2020-06-24 DIAGNOSIS — R10.9 ABDOMINAL PAIN, ACUTE: Primary | ICD-10-CM

## 2020-06-24 DIAGNOSIS — R19.5 DARK STOOLS: ICD-10-CM

## 2020-06-24 PROCEDURE — 80076 HEPATIC FUNCTION PANEL: CPT | Performed by: PHYSICIAN ASSISTANT

## 2020-06-24 PROCEDURE — 99284 EMERGENCY DEPT VISIT MOD MDM: CPT

## 2020-06-24 PROCEDURE — 74177 CT ABD & PELVIS W/CONTRAST: CPT | Performed by: PHYSICIAN ASSISTANT

## 2020-06-24 PROCEDURE — 96361 HYDRATE IV INFUSION ADD-ON: CPT

## 2020-06-24 PROCEDURE — 83690 ASSAY OF LIPASE: CPT | Performed by: PHYSICIAN ASSISTANT

## 2020-06-24 PROCEDURE — 80048 BASIC METABOLIC PNL TOTAL CA: CPT | Performed by: PHYSICIAN ASSISTANT

## 2020-06-24 PROCEDURE — 81003 URINALYSIS AUTO W/O SCOPE: CPT | Performed by: PHYSICIAN ASSISTANT

## 2020-06-24 PROCEDURE — 85025 COMPLETE CBC W/AUTO DIFF WBC: CPT | Performed by: PHYSICIAN ASSISTANT

## 2020-06-24 PROCEDURE — 96374 THER/PROPH/DIAG INJ IV PUSH: CPT

## 2020-06-24 RX ORDER — MORPHINE SULFATE 4 MG/ML
4 INJECTION, SOLUTION INTRAMUSCULAR; INTRAVENOUS ONCE
Status: DISCONTINUED | OUTPATIENT
Start: 2020-06-24 | End: 2020-06-24

## 2020-06-24 RX ORDER — ONDANSETRON 2 MG/ML
4 INJECTION INTRAMUSCULAR; INTRAVENOUS ONCE
Status: DISCONTINUED | OUTPATIENT
Start: 2020-06-24 | End: 2020-06-24

## 2020-06-24 RX ORDER — KETOROLAC TROMETHAMINE 30 MG/ML
30 INJECTION, SOLUTION INTRAMUSCULAR; INTRAVENOUS ONCE
Status: COMPLETED | OUTPATIENT
Start: 2020-06-24 | End: 2020-06-24

## 2020-06-24 RX ORDER — ACETAMINOPHEN 325 MG/1
650 TABLET ORAL
Qty: 40 TABLET | Refills: 0 | Status: SHIPPED | OUTPATIENT
Start: 2020-06-24

## 2020-06-24 NOTE — ED PROVIDER NOTES
Patient Seen in: [facility] Emergency Department    History   Patient presents with:  Abdomen/Flank Pain  GI Bleeding    Stated Complaint: abdominal pain     HPI     Alvino Hedy is a 27year old male who presents with chief complaint o Temp src Oral   SpO2 95 %   O2 Device None (Room air)       Current:/76   Pulse 80   Temp 97.7 °F (36.5 °C) (Oral)   Resp 18   Ht 180.3 cm (5' 11\")   Wt (!) 145.2 kg   SpO2 97%   BMI 44.63 kg/m²     PULSE OX within normal limits on room air as int URINALYSIS WITH CULTURE REFLEX   CBC WITH DIFFERENTIAL WITH PLATELET    Narrative: The following orders were created for panel order CBC WITH DIFFERENTIAL WITH PLATELET.   Procedure                               Abnormality         Status of your blood pressure. Medications Prescribed:  Current Discharge Medication List    START taking these medications    acetaminophen 325 MG Oral Tab  Take 2 tablets (650 mg total) by mouth every 4 to 6 hours as needed for Pain or Fever.   Qty: 40 tablet

## 2020-07-21 ENCOUNTER — APPOINTMENT (OUTPATIENT)
Dept: GENERAL RADIOLOGY | Age: 35
End: 2020-07-21
Attending: NURSE PRACTITIONER
Payer: COMMERCIAL

## 2020-07-21 ENCOUNTER — HOSPITAL ENCOUNTER (OUTPATIENT)
Age: 35
Discharge: HOME | End: 2020-07-21
Payer: COMMERCIAL

## 2020-07-21 VITALS
SYSTOLIC BLOOD PRESSURE: 126 MMHG | BODY MASS INDEX: 42 KG/M2 | WEIGHT: 300 LBS | HEIGHT: 71 IN | DIASTOLIC BLOOD PRESSURE: 93 MMHG | RESPIRATION RATE: 20 BRPM | TEMPERATURE: 98 F | HEART RATE: 85 BPM | OXYGEN SATURATION: 96 %

## 2020-07-21 DIAGNOSIS — G44.209 TENSION HEADACHE: ICD-10-CM

## 2020-07-21 DIAGNOSIS — J06.9 UPPER RESPIRATORY TRACT INFECTION, UNSPECIFIED TYPE: ICD-10-CM

## 2020-07-21 DIAGNOSIS — Z20.822 EXPOSURE TO COVID-19 VIRUS: ICD-10-CM

## 2020-07-21 DIAGNOSIS — J02.9 PHARYNGITIS, UNSPECIFIED ETIOLOGY: ICD-10-CM

## 2020-07-21 DIAGNOSIS — J18.9 PNEUMONIA DUE TO INFECTIOUS ORGANISM, UNSPECIFIED LATERALITY, UNSPECIFIED PART OF LUNG: Primary | ICD-10-CM

## 2020-07-21 LAB — S PYO AG THROAT QL: NEGATIVE

## 2020-07-21 PROCEDURE — 87430 STREP A AG IA: CPT

## 2020-07-21 PROCEDURE — 99214 OFFICE O/P EST MOD 30 MIN: CPT

## 2020-07-21 PROCEDURE — 71046 X-RAY EXAM CHEST 2 VIEWS: CPT | Performed by: NURSE PRACTITIONER

## 2020-07-21 RX ORDER — AZITHROMYCIN 250 MG/1
TABLET, FILM COATED ORAL
Qty: 1 PACKAGE | Refills: 0 | Status: SHIPPED | OUTPATIENT
Start: 2020-07-21 | End: 2020-07-26

## 2020-07-21 RX ORDER — ACETAMINOPHEN 500 MG
1000 TABLET ORAL ONCE
Status: COMPLETED | OUTPATIENT
Start: 2020-07-21 | End: 2020-07-21

## 2020-07-21 NOTE — ED INITIAL ASSESSMENT (HPI)
Headache, + cough, denies fever since friday, denies sore throat, unsure of sick contact to covid, + sick contact for strep, denies sob

## 2020-07-21 NOTE — ED PROVIDER NOTES
Patient Seen in: [facility]      History   Patient presents with:  Headache    Stated Complaint: Coughing ha sore throat body aches    HPI    This is a 21-year-old male with past medical history of hyperlipidemia presenti 126/93   Pulse 85   Temp 98.2 °F (36.8 °C) (Temporal)   Resp 20   Ht 180.3 cm (5' 11\")   Wt 136.1 kg   SpO2 96%   BMI 41.84 kg/m²         Physical Exam  Vitals signs and nursing note reviewed. Constitutional:       Appearance: Normal appearance.    HENT: MDM     Differential diagnosis  Pharyngitis  URI  COVID-19 infection  Pneumonia  Viral illness  Allergies  Headache    19-year-old male well-appearing nontoxic afebrile not hypoxic or tachycardic presenting with body aches and chills headac tract infection, unspecified type  Exposure to COVID-19 virus  Pharyngitis, unspecified etiology    Disposition:  Discharge  7/21/2020  1:49 pm    Follow-up:  Lindsey Krueger MD  [address] [place] [address] [phone]    In 3 days

## 2020-07-23 LAB — SARS-COV-2 RNA RESP QL NAA+PROBE: NOT DETECTED

## 2020-10-06 ENCOUNTER — HOSPITAL ENCOUNTER (OUTPATIENT)
Age: 35
Discharge: HOME | End: 2020-10-06
Attending: EMERGENCY MEDICINE
Payer: COMMERCIAL

## 2020-10-06 ENCOUNTER — APPOINTMENT (OUTPATIENT)
Dept: GENERAL RADIOLOGY | Age: 35
End: 2020-10-06
Attending: EMERGENCY MEDICINE
Payer: COMMERCIAL

## 2020-10-06 VITALS
DIASTOLIC BLOOD PRESSURE: 88 MMHG | TEMPERATURE: 100 F | SYSTOLIC BLOOD PRESSURE: 128 MMHG | OXYGEN SATURATION: 96 % | HEART RATE: 100 BPM | RESPIRATION RATE: 20 BRPM

## 2020-10-06 DIAGNOSIS — I45.10 RIGHT BUNDLE-BRANCH BLOCK: ICD-10-CM

## 2020-10-06 DIAGNOSIS — J02.0 STREPTOCOCCAL SORE THROAT: Primary | ICD-10-CM

## 2020-10-06 PROCEDURE — 93010 ELECTROCARDIOGRAM REPORT: CPT

## 2020-10-06 PROCEDURE — 99215 OFFICE O/P EST HI 40 MIN: CPT

## 2020-10-06 PROCEDURE — 93005 ELECTROCARDIOGRAM TRACING: CPT

## 2020-10-06 PROCEDURE — 99214 OFFICE O/P EST MOD 30 MIN: CPT

## 2020-10-06 PROCEDURE — 93010 ELECTROCARDIOGRAM REPORT: CPT | Performed by: EMERGENCY MEDICINE

## 2020-10-06 PROCEDURE — 71046 X-RAY EXAM CHEST 2 VIEWS: CPT | Performed by: EMERGENCY MEDICINE

## 2020-10-06 PROCEDURE — 87430 STREP A AG IA: CPT

## 2020-10-06 RX ORDER — AMOXICILLIN 875 MG/1
875 TABLET, COATED ORAL 2 TIMES DAILY
Qty: 20 TABLET | Refills: 0 | Status: SHIPPED | OUTPATIENT
Start: 2020-10-06 | End: 2020-10-16

## 2020-10-06 NOTE — ED INITIAL ASSESSMENT (HPI)
3 days of headache, congestion, sore throat, chest tightness with cough. No fever. No n/v/d. SOB at times.  Hx of pneumonia several months ago with negative COVID test.

## 2020-10-06 NOTE — ED PROVIDER NOTES
Patient Seen in: [facility]      History   Patient presents with:  Sore Throat    Stated Complaint: CHEST PAIN, SORE THROAT    HPI    The patient is a 26-year-old male with past history of hyperlipidemia, pneumonia in the Physical Exam    Constitutional: Well-developed well-nourished in no acute distress  Head: Normocephalic, no swelling or tenderness  Eyes: Nonicteric sclera, no conjunctival injection  ENT: TMs are clear and flat bilaterally.   There is no posterior medications    amoxicillin 875 MG Oral Tab  Take 1 tablet (875 mg total) by mouth 2 (two) times daily for 10 days.   Qty: 20 tablet Refills: 0

## 2020-12-03 ENCOUNTER — HOSPITAL ENCOUNTER (EMERGENCY)
Facility: HOSPITAL | Age: 35
Discharge: HOME | End: 2020-12-03
Attending: PHYSICIAN ASSISTANT
Payer: COMMERCIAL

## 2020-12-03 ENCOUNTER — HOSPITAL ENCOUNTER (OUTPATIENT)
Age: 35
Discharge: ANOTHER HOSPITAL | End: 2020-12-03
Attending: EMERGENCY MEDICINE
Payer: COMMERCIAL

## 2020-12-03 ENCOUNTER — APPOINTMENT (OUTPATIENT)
Dept: CT IMAGING | Facility: HOSPITAL | Age: 35
End: 2020-12-03
Attending: PHYSICIAN ASSISTANT
Payer: COMMERCIAL

## 2020-12-03 VITALS
DIASTOLIC BLOOD PRESSURE: 78 MMHG | TEMPERATURE: 98 F | OXYGEN SATURATION: 95 % | WEIGHT: 300 LBS | RESPIRATION RATE: 18 BRPM | HEART RATE: 85 BPM | SYSTOLIC BLOOD PRESSURE: 129 MMHG | HEIGHT: 71 IN | BODY MASS INDEX: 42 KG/M2

## 2020-12-03 VITALS
RESPIRATION RATE: 16 BRPM | HEIGHT: 71 IN | TEMPERATURE: 98 F | HEART RATE: 79 BPM | WEIGHT: 300 LBS | DIASTOLIC BLOOD PRESSURE: 73 MMHG | BODY MASS INDEX: 42 KG/M2 | OXYGEN SATURATION: 94 % | SYSTOLIC BLOOD PRESSURE: 119 MMHG

## 2020-12-03 DIAGNOSIS — R19.7 DIARRHEA, UNSPECIFIED TYPE: ICD-10-CM

## 2020-12-03 DIAGNOSIS — R10.13 ABDOMINAL PAIN, EPIGASTRIC: Primary | ICD-10-CM

## 2020-12-03 DIAGNOSIS — R10.10 PAIN OF UPPER ABDOMEN: Primary | ICD-10-CM

## 2020-12-03 PROCEDURE — 83690 ASSAY OF LIPASE: CPT | Performed by: PHYSICIAN ASSISTANT

## 2020-12-03 PROCEDURE — 96375 TX/PRO/DX INJ NEW DRUG ADDON: CPT

## 2020-12-03 PROCEDURE — 99212 OFFICE O/P EST SF 10 MIN: CPT

## 2020-12-03 PROCEDURE — 96374 THER/PROPH/DIAG INJ IV PUSH: CPT

## 2020-12-03 PROCEDURE — 74177 CT ABD & PELVIS W/CONTRAST: CPT | Performed by: PHYSICIAN ASSISTANT

## 2020-12-03 PROCEDURE — 80053 COMPREHEN METABOLIC PANEL: CPT | Performed by: PHYSICIAN ASSISTANT

## 2020-12-03 PROCEDURE — 85025 COMPLETE CBC W/AUTO DIFF WBC: CPT | Performed by: PHYSICIAN ASSISTANT

## 2020-12-03 PROCEDURE — 99213 OFFICE O/P EST LOW 20 MIN: CPT

## 2020-12-03 PROCEDURE — S0028 INJECTION, FAMOTIDINE, 20 MG: HCPCS | Performed by: PHYSICIAN ASSISTANT

## 2020-12-03 PROCEDURE — 81001 URINALYSIS AUTO W/SCOPE: CPT | Performed by: PHYSICIAN ASSISTANT

## 2020-12-03 PROCEDURE — 96361 HYDRATE IV INFUSION ADD-ON: CPT

## 2020-12-03 PROCEDURE — 99284 EMERGENCY DEPT VISIT MOD MDM: CPT

## 2020-12-03 RX ORDER — FAMOTIDINE 10 MG/ML
20 INJECTION, SOLUTION INTRAVENOUS ONCE
Status: COMPLETED | OUTPATIENT
Start: 2020-12-03 | End: 2020-12-03

## 2020-12-03 RX ORDER — FAMOTIDINE 20 MG/1
20 TABLET ORAL 2 TIMES DAILY
Qty: 28 TABLET | Refills: 0 | Status: SHIPPED | OUTPATIENT
Start: 2020-12-03 | End: 2020-12-17

## 2020-12-03 RX ORDER — ONDANSETRON 2 MG/ML
4 INJECTION INTRAMUSCULAR; INTRAVENOUS ONCE
Status: COMPLETED | OUTPATIENT
Start: 2020-12-03 | End: 2020-12-03

## 2020-12-03 RX ORDER — MORPHINE SULFATE 4 MG/ML
4 INJECTION, SOLUTION INTRAMUSCULAR; INTRAVENOUS ONCE
Status: COMPLETED | OUTPATIENT
Start: 2020-12-03 | End: 2020-12-03

## 2020-12-03 NOTE — ED INITIAL ASSESSMENT (HPI)
Pt to ED from [address] [place] [address] with c/o increasing mid abdominal/epigastric pain for 3 days. Pt denies nausea of vomiting. Pt c/o intermittent diarrhea. Pt denies fall or trauma. Pt denies fever. Pt denies dark or bloody stools. Pt skin parameters WNL.  Pt states increas

## 2020-12-03 NOTE — ED PROVIDER NOTES
Patient Seen in: [facility]      History   Patient presents with:  Abdominal Pain    Stated Complaint: abdominal pain, headache    HPI    Patient is a 29-year-old male who presents with upper abdominal pain x4 days.   He states he has been abl hypoactive. TTP in epigastrum. No guarding or rebound.  murphys negative  Extremities: FROM of all extremities, no cyanosis/clubbing/edema  Neuro: CN intact, normal speech, normal gait, 5/5 motor strength in all extremities, no focal deficits  SKIN: warm, d

## 2020-12-04 ENCOUNTER — TELEPHONE (OUTPATIENT)
Dept: GASTROENTEROLOGY | Facility: CLINIC | Age: 35
End: 2020-12-04

## 2020-12-04 NOTE — ED NOTES
Pt A&OX4, pt denies dizziness, lightheadedness, cp, sob, n/v. Discharge paperwork and prescription discussed with pt, pt verbally understands them. Pt discharged ambulatory with steady gait - no distress noted.

## 2020-12-04 NOTE — TELEPHONE ENCOUNTER
Patient states he was at ER yesterday and was discharged. He was in the hospital due to severe abdominal pain. He was advised to follow up with Dr. Saira Padilla within the next few days.  The schedule is currently booked out for Dr. Saira Padilla and Aida Winter as wel

## 2020-12-04 NOTE — ED PROVIDER NOTES
Patient Seen in: [facility] AND [facility] Emergency Department    History   Patient presents with:  Abdomen/Flank Pain    Stated Complaint:     TAZ Shepherd is a 38year old male who presents with chief complaint of epigastric abdominal pain. HPI.    Physical Exam     ED Triage Vitals [12/03/20 1614]   /85   Pulse 83   Resp 18   Temp 98.1 °F (36.7 °C)   Temp src Oral   SpO2 94 %   O2 Device None (Room air)       Current:/85   Pulse 83   Temp 98.1 °F (36.7 °C) (Oral)   Resp 18   Ht 1 Ratio 0.9 (*)     All other components within normal limits   URINALYSIS WITH CULTURE REFLEX - Abnormal; Notable for the following components:    Leukocyte Esterase Urine Trace (*)     All other components within normal limits   LIPASE - Normal   CBC WITH [facility] [address]  [phone]    Schedule an appointment as soon as possible for a visit in 2 days  For follow-up    We recommend that you schedule follow up care with a primary care provider within the next three months to obtain basic health scre

## 2020-12-04 NOTE — TELEPHONE ENCOUNTER
Left message for patient to call back. I tentatively placed patient for an 8am appointment with [facility] [address] this Monday-he needs to confirm before office closes at 5pm today it will be removed.     CSS-if patient calls back, please confirm if patient can ma

## 2020-12-04 NOTE — TELEPHONE ENCOUNTER
Noted.  Thank you very much.     Future Appointments   Date Time Provider Caleb Clay   12/7/2020  8:00 AM JOVANNY Parker [facility] Ashlyn DALTON

## 2020-12-05 ENCOUNTER — HOSPITAL ENCOUNTER (EMERGENCY)
Facility: HOSPITAL | Age: 35
Discharge: HOME | End: 2020-12-05
Attending: EMERGENCY MEDICINE
Payer: COMMERCIAL

## 2020-12-05 VITALS
TEMPERATURE: 98 F | OXYGEN SATURATION: 94 % | RESPIRATION RATE: 22 BRPM | SYSTOLIC BLOOD PRESSURE: 135 MMHG | DIASTOLIC BLOOD PRESSURE: 84 MMHG | HEIGHT: 71 IN | HEART RATE: 102 BPM | BODY MASS INDEX: 42 KG/M2 | WEIGHT: 300 LBS

## 2020-12-05 DIAGNOSIS — Z20.822 SUSPECTED 2019 NOVEL CORONAVIRUS INFECTION: Primary | ICD-10-CM

## 2020-12-05 PROCEDURE — 99283 EMERGENCY DEPT VISIT LOW MDM: CPT

## 2020-12-05 NOTE — ED INITIAL ASSESSMENT (HPI)
Headache and chills x 5 days, cough started today. Unsure if had fever.  No known covid contacts, but wants test

## 2020-12-06 NOTE — ED PROVIDER NOTES
Patient Seen in: [facility] AND [facility] Emergency Department    History   Patient presents with:  Headache  Cough/URI      HPI    Patient presents to the ED complaining of headache and chills for the past 5 days. Dry cough started today. No known fevers.   Michael Aranda duration of the exam.  Handwashing was performed prior to and after the exam.  Stethoscope and any equipment used during my examination was cleaned with super sani-cloth germicidal wipes following the exam.     Physical Exam   Constitutional: He is oriente Clinical Impression:  Suspected 2019 novel coronavirus infection  (primary encounter diagnosis)    Disposition:  Discharge    Follow-up:  [facility] Emergency Department  [address]   [address] [place] [address] [phone]    If symptom

## 2020-12-28 ENCOUNTER — HOSPITAL ENCOUNTER (EMERGENCY)
Facility: HOSPITAL | Age: 35
Discharge: HOME | End: 2020-12-28
Attending: EMERGENCY MEDICINE
Payer: COMMERCIAL

## 2020-12-28 VITALS
WEIGHT: 315 LBS | HEIGHT: 71 IN | RESPIRATION RATE: 18 BRPM | BODY MASS INDEX: 44.1 KG/M2 | DIASTOLIC BLOOD PRESSURE: 96 MMHG | OXYGEN SATURATION: 95 % | TEMPERATURE: 98 F | SYSTOLIC BLOOD PRESSURE: 136 MMHG | HEART RATE: 66 BPM

## 2020-12-28 DIAGNOSIS — M54.50 BACK PAIN, LUMBOSACRAL: Primary | ICD-10-CM

## 2020-12-28 PROCEDURE — 81001 URINALYSIS AUTO W/SCOPE: CPT | Performed by: EMERGENCY MEDICINE

## 2020-12-28 PROCEDURE — 96372 THER/PROPH/DIAG INJ SC/IM: CPT

## 2020-12-28 PROCEDURE — 99283 EMERGENCY DEPT VISIT LOW MDM: CPT

## 2020-12-28 RX ORDER — LIDOCAINE 50 MG/G
1 PATCH TOPICAL EVERY 24 HOURS
Qty: 6 PATCH | Refills: 0 | Status: SHIPPED | OUTPATIENT
Start: 2020-12-28 | End: 2021-01-03

## 2020-12-28 RX ORDER — ORPHENADRINE CITRATE 100 MG/1
100 TABLET, EXTENDED RELEASE ORAL 2 TIMES DAILY
Qty: 10 TABLET | Refills: 0 | Status: SHIPPED | OUTPATIENT
Start: 2020-12-28 | End: 2021-01-02

## 2020-12-28 RX ORDER — KETOROLAC TROMETHAMINE 30 MG/ML
30 INJECTION, SOLUTION INTRAMUSCULAR; INTRAVENOUS ONCE
Status: COMPLETED | OUTPATIENT
Start: 2020-12-28 | End: 2020-12-28

## 2020-12-28 RX ORDER — NAPROXEN 500 MG/1
500 TABLET ORAL 2 TIMES DAILY WITH MEALS
Qty: 10 TABLET | Refills: 0 | Status: SHIPPED | OUTPATIENT
Start: 2020-12-28 | End: 2021-01-02

## 2020-12-29 NOTE — ED PROVIDER NOTES
Patient Seen in: [facility] AND [facility] Emergency Department      History   Patient presents with:  Back Pain    Stated Complaint: Back Pain for a week    HPI    39 y/o male w/ intermittent low back issues here for evaluation of back pain starting recently wo Soft. There is no tenderness. There is no guarding. Musculoskeletal: Normal range of motion. No thoracic back pain but the patient does have some diffuse lumbosacral pain without focality. No signs of trauma. No bony deformity. No flank pain.   Normal for 6 days. Qty: 6 patch Refills: 0    Orphenadrine Citrate  MG Oral Tablet 12 Hr  Take 100 mg by mouth 2 (two) times daily for 5 days.   Qty: 10 tablet Refills: 0

## 2020-12-29 NOTE — H&P
[address] [facility]    Patient presen Date   • Hyperlipidemia    • Visual impairment     glasses      Past Surgical History:   Procedure Laterality Date   • CYST/MASS EXCISION N/A 12/19/2019    Performed by Lillian Dillard MD at [facility] OR   • OTHER SURGICAL HISTORY  12/19/2019    excision jaxson m), weight (!) 339 lb (153.8 kg).     Gen: patient appears comfortable and in no acute distress  HEENT: conjunctiva pink, the sclera appears anicteric, oropharynx clear, mucus membranes appear moist  CV: regular rate and rhythm, the extremities are warm and musculoskeletal etiology if his work-up is otherwise negative. After discussion, we have elected to proceed with H. pylori testing and a trial of Protonix 40 mg daily for the next few months with reevaluation in office at the time.  Consideration for an EGD

## 2020-12-29 NOTE — ED NOTES
Patient cleared for discharge by MD. Patient verbalizes understanding of discharge instructions and prescriptions. Patient rates his pain a 0/10 upon discharge. Patient ambulatory with a steady gait upon discharge.

## 2021-01-05 ENCOUNTER — OFFICE VISIT (OUTPATIENT)
Dept: GASTROENTEROLOGY | Facility: CLINIC | Age: 36
End: 2021-01-05
Payer: COMMERCIAL

## 2021-01-05 VITALS
TEMPERATURE: 99 F | SYSTOLIC BLOOD PRESSURE: 129 MMHG | DIASTOLIC BLOOD PRESSURE: 75 MMHG | WEIGHT: 315 LBS | HEIGHT: 71 IN | BODY MASS INDEX: 44.1 KG/M2 | HEART RATE: 88 BPM

## 2021-01-05 DIAGNOSIS — R10.10 UPPER ABDOMINAL PAIN: Primary | ICD-10-CM

## 2021-01-05 PROCEDURE — 99213 OFFICE O/P EST LOW 20 MIN: CPT | Performed by: NURSE PRACTITIONER

## 2021-01-05 PROCEDURE — 3074F SYST BP LT 130 MM HG: CPT | Performed by: NURSE PRACTITIONER

## 2021-01-05 PROCEDURE — 3008F BODY MASS INDEX DOCD: CPT | Performed by: NURSE PRACTITIONER

## 2021-01-05 PROCEDURE — 3078F DIAST BP <80 MM HG: CPT | Performed by: NURSE PRACTITIONER

## 2021-01-05 RX ORDER — KETOCONAZOLE 20 MG/ML
SHAMPOO TOPICAL
COMMUNITY
Start: 2020-11-02

## 2021-01-05 RX ORDER — FLUOCINOLONE ACETONIDE 0.11 MG/ML
OIL TOPICAL
COMMUNITY
Start: 2020-07-15

## 2021-01-05 NOTE — PATIENT INSTRUCTIONS
-Complete H. pylori testing  -medication depending on lab result   -Follow-up in 3 months or sooner if new issues arise

## 2021-02-04 ENCOUNTER — TELEPHONE (OUTPATIENT)
Dept: GASTROENTEROLOGY | Facility: CLINIC | Age: 36
End: 2021-02-04

## 2021-04-08 ENCOUNTER — HOSPITAL ENCOUNTER (OUTPATIENT)
Age: 36
Discharge: HOME | End: 2021-04-08
Attending: EMERGENCY MEDICINE
Payer: COMMERCIAL

## 2021-04-08 VITALS
OXYGEN SATURATION: 96 % | HEART RATE: 77 BPM | RESPIRATION RATE: 20 BRPM | TEMPERATURE: 98 F | DIASTOLIC BLOOD PRESSURE: 79 MMHG | SYSTOLIC BLOOD PRESSURE: 123 MMHG

## 2021-04-08 DIAGNOSIS — B34.9 VIRAL SYNDROME: Primary | ICD-10-CM

## 2021-04-08 PROCEDURE — 80047 BASIC METABLC PNL IONIZED CA: CPT

## 2021-04-08 PROCEDURE — 36415 COLL VENOUS BLD VENIPUNCTURE: CPT

## 2021-04-08 PROCEDURE — 85025 COMPLETE CBC W/AUTO DIFF WBC: CPT | Performed by: EMERGENCY MEDICINE

## 2021-04-08 PROCEDURE — 99213 OFFICE O/P EST LOW 20 MIN: CPT

## 2021-04-08 PROCEDURE — 81002 URINALYSIS NONAUTO W/O SCOPE: CPT

## 2021-04-08 NOTE — ED PROVIDER NOTES
Patient Seen in: [facility]      History   Patient presents with:  Covid-19 Test    Stated Complaint: Headache body aches exposed Covid muschle spasms    HPI/Subjective:   HPI    The patient is a 58-year-old male with no significant past medi Exam    Constitutional: Well-developed well-nourished in no acute distress  Head: Normocephalic, no swelling or tenderness  Eyes: Nonicteric sclera, no conjunctival injection  ENT: TMs are clear and flat bilaterally.   There is no posterior pharyngeal eryth

## 2021-04-15 ENCOUNTER — HOSPITAL ENCOUNTER (OUTPATIENT)
Age: 36
Discharge: HOME | End: 2021-04-15
Payer: COMMERCIAL

## 2021-04-15 VITALS
TEMPERATURE: 99 F | DIASTOLIC BLOOD PRESSURE: 78 MMHG | OXYGEN SATURATION: 95 % | HEART RATE: 94 BPM | SYSTOLIC BLOOD PRESSURE: 126 MMHG | RESPIRATION RATE: 18 BRPM

## 2021-04-15 DIAGNOSIS — J02.9 ACUTE PHARYNGITIS, UNSPECIFIED ETIOLOGY: Primary | ICD-10-CM

## 2021-04-15 PROCEDURE — 99213 OFFICE O/P EST LOW 20 MIN: CPT

## 2021-04-15 PROCEDURE — 87880 STREP A ASSAY W/OPTIC: CPT

## 2021-04-15 PROCEDURE — 99212 OFFICE O/P EST SF 10 MIN: CPT

## 2021-04-15 NOTE — ED PROVIDER NOTES
Patient Seen in: [facility]    History   CC: sore throat  HPI: Supriya Gomes 38year old male  who presents c/o sore throat and cough upon waking this morning. States his daughter recently had strep.   Denies any known Covid exposures mouth.           Constitutional and vital signs reviewed.         Physical Exam     ED Triage Vitals [04/15/21 1442]   /78   Pulse 94   Resp 18   Temp 99.1 °F (37.3 °C)   Temp src    SpO2 95 %   O2 Device None (Room air)       Current:/78   Puls appointment as soon as possible for a visit in 2 days        Medications Prescribed:  Current Discharge Medication List

## 2021-04-15 NOTE — ED INITIAL ASSESSMENT (HPI)
PATIENT ARRIVED AMBULATORY TO ROOM C/O A SORE THROAT AND SLIGHT COUGH THAT STARTED TODAY. PATIENT'S DAUGHTER TESTED POSITIVE FOR STREP 2 DAYS AGO. NO N/V/D. EASY NON LABORED RESPIRATIONS.  NO FEVERS

## 2021-04-26 ENCOUNTER — TELEMEDICINE (OUTPATIENT)
Dept: FAMILY MEDICINE CLINIC | Facility: CLINIC | Age: 36
End: 2021-04-26
Payer: COMMERCIAL

## 2021-04-26 DIAGNOSIS — J00 ACUTE NASOPHARYNGITIS: Primary | ICD-10-CM

## 2021-04-26 DIAGNOSIS — R10.13 DYSPEPSIA: ICD-10-CM

## 2021-04-26 DIAGNOSIS — R05.9 COUGH: ICD-10-CM

## 2021-04-26 PROCEDURE — 99213 OFFICE O/P EST LOW 20 MIN: CPT | Performed by: FAMILY MEDICINE

## 2021-04-26 RX ORDER — CIMETIDINE 400 MG/1
400 TABLET, FILM COATED ORAL 2 TIMES DAILY
Qty: 20 TABLET | Refills: 0 | Status: SHIPPED | OUTPATIENT
Start: 2021-04-26 | End: 2021-05-06

## 2021-04-26 NOTE — PROGRESS NOTES
This visit is conducted using Telemedicine with live, interactive video and audio. Patient has been referred to the [facility] website at [email or web address] to review the yearly Consent to Treat document.     Patient understands and accepts financial res Diagnosis Date   • Hyperlipidemia    • Visual impairment     glasses      Social History:  Social History    Tobacco Use      Smoking status: Former Smoker      Smokeless tobacco: Never Used    Vaping Use      Vaping Use: Never used    Alcohol use: Not C recognition technology. Please excuse any typos.

## 2021-08-26 ENCOUNTER — HOSPITAL ENCOUNTER (OUTPATIENT)
Age: 36
Discharge: HOME | End: 2021-08-26
Payer: COMMERCIAL

## 2021-08-26 VITALS
RESPIRATION RATE: 16 BRPM | WEIGHT: 315 LBS | DIASTOLIC BLOOD PRESSURE: 81 MMHG | BODY MASS INDEX: 46 KG/M2 | TEMPERATURE: 98 F | OXYGEN SATURATION: 94 % | HEART RATE: 86 BPM | SYSTOLIC BLOOD PRESSURE: 139 MMHG

## 2021-08-26 DIAGNOSIS — Z20.822 LAB TEST NEGATIVE FOR COVID-19 VIRUS: ICD-10-CM

## 2021-08-26 DIAGNOSIS — J02.0 STREPTOCOCCAL PHARYNGITIS: Primary | ICD-10-CM

## 2021-08-26 LAB
S PYO AG THROAT QL: POSITIVE
SARS-COV-2 RNA RESP QL NAA+PROBE: NOT DETECTED

## 2021-08-26 PROCEDURE — 99213 OFFICE O/P EST LOW 20 MIN: CPT

## 2021-08-26 PROCEDURE — 87880 STREP A ASSAY W/OPTIC: CPT

## 2021-08-26 RX ORDER — AMOXICILLIN 875 MG/1
875 TABLET, COATED ORAL 2 TIMES DAILY
Qty: 20 TABLET | Refills: 0 | Status: SHIPPED | OUTPATIENT
Start: 2021-08-26 | End: 2021-09-05

## 2021-08-26 NOTE — ED INITIAL ASSESSMENT (HPI)
Patient woke up with headache, sore throat, and body aches. Patient requests COVID testing because he has small children and a pregnant wife at home.  Patient is unvaccinated for COVID

## 2021-08-26 NOTE — ED PROVIDER NOTES
Patient Seen in: [facility]      History   Patient presents with:  Testing    Stated Complaint: COVID-19 TEST    HPI/Subjective:   HPI    This is a 79-year-old male presenting for headache sore throat body aches.   Patient states, he woke up w Cardiovascular:      Rate and Rhythm: Normal rate. Heart sounds: Normal heart sounds. Pulmonary:      Effort: Pulmonary effort is normal. No respiratory distress. Breath sounds: Normal breath sounds. No wheezing.    Musculoskeletal:         [place] [id number]  [phone]    Schedule an appointment as soon as possible for a visit in 3 days            Medications Prescribed:  Discharge Medication List as of 8/26/2021  2:09 PM    START taking these medications    amoxicillin 875 MG Oral Tab  Take 1 tablet

## 2021-12-24 ENCOUNTER — HOSPITAL ENCOUNTER (OUTPATIENT)
Age: 36
Discharge: HOME | End: 2021-12-24
Attending: EMERGENCY MEDICINE
Payer: COMMERCIAL

## 2021-12-24 VITALS
DIASTOLIC BLOOD PRESSURE: 83 MMHG | HEART RATE: 86 BPM | RESPIRATION RATE: 18 BRPM | OXYGEN SATURATION: 98 % | TEMPERATURE: 98 F | SYSTOLIC BLOOD PRESSURE: 121 MMHG

## 2021-12-24 DIAGNOSIS — U07.1 COVID-19: Primary | ICD-10-CM

## 2021-12-24 PROCEDURE — 87880 STREP A ASSAY W/OPTIC: CPT

## 2021-12-24 PROCEDURE — 99213 OFFICE O/P EST LOW 20 MIN: CPT

## 2021-12-24 NOTE — ED PROVIDER NOTES
Patient Seen in: [facility]      History   Patient presents with:  Cough/URI    Stated Complaint: COVID symptoms    Subjective:   HPI    The patient is a 48-year-old male with a past history of hyperlipidemia, unvaccinated for Covid, who pres Rapid SARS-CoV-2 by PCR Detected (*)     All other components within normal limits   POCT RAPID STREP - Normal          Pulse ox is 98% on room air, normal.  Vital signs are stable     Patient's negative strep and positive Covid were discussed with the

## 2022-01-02 ENCOUNTER — TELEPHONE (OUTPATIENT)
Dept: FAMILY MEDICINE CLINIC | Facility: CLINIC | Age: 37
End: 2022-01-02

## 2022-01-02 ENCOUNTER — APPOINTMENT (OUTPATIENT)
Dept: GENERAL RADIOLOGY | Facility: HOSPITAL | Age: 37
End: 2022-01-02
Attending: EMERGENCY MEDICINE
Payer: COMMERCIAL

## 2022-01-02 ENCOUNTER — HOSPITAL ENCOUNTER (EMERGENCY)
Facility: HOSPITAL | Age: 37
Discharge: HOME | End: 2022-01-02
Attending: EMERGENCY MEDICINE
Payer: COMMERCIAL

## 2022-01-02 ENCOUNTER — HOSPITAL ENCOUNTER (OUTPATIENT)
Age: 37
Discharge: ANOTHER HOSPITAL | End: 2022-01-02
Payer: COMMERCIAL

## 2022-01-02 VITALS
SYSTOLIC BLOOD PRESSURE: 157 MMHG | HEART RATE: 97 BPM | TEMPERATURE: 99 F | OXYGEN SATURATION: 94 % | DIASTOLIC BLOOD PRESSURE: 88 MMHG | RESPIRATION RATE: 24 BRPM | HEIGHT: 71 IN | WEIGHT: 315 LBS | BODY MASS INDEX: 44.1 KG/M2

## 2022-01-02 VITALS
RESPIRATION RATE: 20 BRPM | DIASTOLIC BLOOD PRESSURE: 79 MMHG | TEMPERATURE: 98 F | SYSTOLIC BLOOD PRESSURE: 121 MMHG | HEART RATE: 112 BPM | OXYGEN SATURATION: 90 %

## 2022-01-02 DIAGNOSIS — U07.1 COVID-19 VIRUS INFECTION: ICD-10-CM

## 2022-01-02 DIAGNOSIS — R09.02 HYPOXIA: Primary | ICD-10-CM

## 2022-01-02 DIAGNOSIS — U07.1 PNEUMONIA DUE TO COVID-19 VIRUS: Primary | ICD-10-CM

## 2022-01-02 DIAGNOSIS — R00.0 TACHYCARDIA: ICD-10-CM

## 2022-01-02 DIAGNOSIS — R07.9 CHEST PAIN, UNSPECIFIED TYPE: ICD-10-CM

## 2022-01-02 DIAGNOSIS — J12.82 PNEUMONIA DUE TO COVID-19 VIRUS: Primary | ICD-10-CM

## 2022-01-02 DIAGNOSIS — R06.00 DYSPNEA, UNSPECIFIED TYPE: ICD-10-CM

## 2022-01-02 LAB
ANION GAP SERPL CALC-SCNC: 5 MMOL/L (ref 0–18)
BASOPHILS # BLD AUTO: 0.05 X10(3) UL (ref 0–0.2)
BASOPHILS NFR BLD AUTO: 1 %
BUN BLD-MCNC: 12 MG/DL (ref 7–18)
BUN/CREAT SERPL: 10.3 (ref 10–20)
CALCIUM BLD-MCNC: 9.4 MG/DL (ref 8.5–10.1)
CHLORIDE SERPL-SCNC: 105 MMOL/L (ref 98–112)
CO2 SERPL-SCNC: 26 MMOL/L (ref 21–32)
CREAT BLD-MCNC: 1.17 MG/DL
D DIMER PPP FEU-MCNC: 0.38 UG/ML FEU (ref ?–0.5)
DEPRECATED RDW RBC AUTO: 43.2 FL (ref 35.1–46.3)
EOSINOPHIL # BLD AUTO: 0.08 X10(3) UL (ref 0–0.7)
EOSINOPHIL NFR BLD AUTO: 1.5 %
ERYTHROCYTE [DISTWIDTH] IN BLOOD BY AUTOMATED COUNT: 13.3 % (ref 11–15)
GLUCOSE BLD-MCNC: 128 MG/DL (ref 70–99)
HCT VFR BLD AUTO: 50.2 %
HGB BLD-MCNC: 16.3 G/DL
IMM GRANULOCYTES # BLD AUTO: 0.03 X10(3) UL (ref 0–1)
IMM GRANULOCYTES NFR BLD: 0.6 %
LYMPHOCYTES # BLD AUTO: 2.12 X10(3) UL (ref 1–4)
LYMPHOCYTES NFR BLD AUTO: 40.7 %
MCH RBC QN AUTO: 28.6 PG (ref 26–34)
MCHC RBC AUTO-ENTMCNC: 32.5 G/DL (ref 31–37)
MCV RBC AUTO: 88.1 FL
MONOCYTES # BLD AUTO: 0.36 X10(3) UL (ref 0.1–1)
MONOCYTES NFR BLD AUTO: 6.9 %
NEUTROPHILS # BLD AUTO: 2.57 X10 (3) UL (ref 1.5–7.7)
NEUTROPHILS # BLD AUTO: 2.57 X10(3) UL (ref 1.5–7.7)
NEUTROPHILS NFR BLD AUTO: 49.3 %
OSMOLALITY SERPL CALC.SUM OF ELEC: 283 MOSM/KG (ref 275–295)
PLATELET # BLD AUTO: 284 10(3)UL (ref 150–450)
POTASSIUM SERPL-SCNC: 3.6 MMOL/L (ref 3.5–5.1)
RBC # BLD AUTO: 5.7 X10(6)UL
SODIUM SERPL-SCNC: 136 MMOL/L (ref 136–145)
TROPONIN I HIGH SENSITIVITY: 7 NG/L
WBC # BLD AUTO: 5.2 X10(3) UL (ref 4–11)

## 2022-01-02 PROCEDURE — 93010 ELECTROCARDIOGRAM REPORT: CPT | Performed by: EMERGENCY MEDICINE

## 2022-01-02 PROCEDURE — 80048 BASIC METABOLIC PNL TOTAL CA: CPT

## 2022-01-02 PROCEDURE — 93005 ELECTROCARDIOGRAM TRACING: CPT

## 2022-01-02 PROCEDURE — 85379 FIBRIN DEGRADATION QUANT: CPT | Performed by: EMERGENCY MEDICINE

## 2022-01-02 PROCEDURE — 99213 OFFICE O/P EST LOW 20 MIN: CPT

## 2022-01-02 PROCEDURE — 71045 X-RAY EXAM CHEST 1 VIEW: CPT | Performed by: EMERGENCY MEDICINE

## 2022-01-02 PROCEDURE — 36415 COLL VENOUS BLD VENIPUNCTURE: CPT

## 2022-01-02 PROCEDURE — 85025 COMPLETE CBC W/AUTO DIFF WBC: CPT | Performed by: EMERGENCY MEDICINE

## 2022-01-02 PROCEDURE — 84484 ASSAY OF TROPONIN QUANT: CPT | Performed by: EMERGENCY MEDICINE

## 2022-01-02 PROCEDURE — 99284 EMERGENCY DEPT VISIT MOD MDM: CPT

## 2022-01-02 PROCEDURE — 85025 COMPLETE CBC W/AUTO DIFF WBC: CPT

## 2022-01-02 PROCEDURE — 99215 OFFICE O/P EST HI 40 MIN: CPT

## 2022-01-02 PROCEDURE — 80048 BASIC METABOLIC PNL TOTAL CA: CPT | Performed by: EMERGENCY MEDICINE

## 2022-01-02 NOTE — ED QUICK NOTES
Patient was provided with Home Pulse Oximetry and Incentive Spirometry, and instructed on use. Patient verbalized understanding. Discussed discharge and follow-up instructions. Patient verbalized understanding.

## 2022-01-02 NOTE — ED PROVIDER NOTES
Patient Seen in: [facility] AND [facility] Emergency Department      History   Patient presents with:  Shortness Of Breath  Covid    Stated Complaint: covid+,Shortness of breath    Subjective:   HPI    Patient presents to the emergency department short of breath Pulmonary effort is normal. No respiratory distress. Breath sounds: Normal breath sounds. Abdominal:      General: There is no distension. Palpations: Abdomen is soft. Tenderness: There is no abdominal tenderness.    Musculoskeletal: with a basilar predominance, probably multifocal pneumonia/pneumonitis from patient's known diagnosis of COVID-19.     Dictated by (CST): Enrrique Mc MD on 1/02/2022 at 11:25 AM     Finalized by (CST): Enrrique Mc MD on 1/02/2022 at 11:26 AM

## 2022-01-02 NOTE — TELEPHONE ENCOUNTER
Cecil Osler  2022  9:52 AM   ED  MRN:  [id number]  Description: 39year old male Department: [address] [place] [address] EMERGENCY         Patient Information    Patient Name   Deborah Still MRN   [id number] Legal Sex   Male    1985     Darlyn

## 2022-01-02 NOTE — ED INITIAL ASSESSMENT (HPI)
Patient states at was  [place] today for trouble breathing. Pt instructed to come to ED  Positive Covid 12/24.

## 2022-01-02 NOTE — ED PROVIDER NOTES
Patient Seen in: [facility]    History   Patient presents with:  Difficulty Breathing    Stated Complaint: Chest pain, hard to breathe/talk, cough    HPI    58-year-old male presents with chief complaint of dyspnea. Onset 2 days ago.  Patient hard to breathe/talk, cough  Other systems are as noted in HPI. Constitutional and vital signs reviewed. All other systems reviewed and negative except as noted above. PSFH elements reviewed from today and agreed except as otherwise stated in HPI. bruising. No obvious rash. ED Course   Labs Reviewed - No data to display    MDM     Physical exam remained stable over serial reexaminations as previously documented. Physical exam items discussed with patient.     44-year-old male presents with chief

## 2022-01-04 NOTE — TELEPHONE ENCOUNTER
Attempted to call pt x 2, it ring then goes to busy signal.  Pt positive covid 12-24-21. No phone response letter mailed to pt.           LUIS

## 2022-02-18 NOTE — TELEPHONE ENCOUNTER
2nd overdue reminder letter mailed out to patient.
3rd and final overdue reminder letter mailed out to patient.
Overdue reminder letter mailed out to patient.      Lab:   HELICOBACTER PYLORI BREATH TEST, ADULT
36.5

## 2022-04-07 ENCOUNTER — HOSPITAL ENCOUNTER (EMERGENCY)
Facility: HOSPITAL | Age: 37
Discharge: HOME | End: 2022-04-08
Attending: EMERGENCY MEDICINE
Payer: COMMERCIAL

## 2022-04-07 VITALS
SYSTOLIC BLOOD PRESSURE: 130 MMHG | BODY MASS INDEX: 45 KG/M2 | DIASTOLIC BLOOD PRESSURE: 80 MMHG | RESPIRATION RATE: 18 BRPM | OXYGEN SATURATION: 95 % | TEMPERATURE: 99 F | HEART RATE: 82 BPM | WEIGHT: 315 LBS

## 2022-04-07 DIAGNOSIS — L03.313 CELLULITIS OF CHEST WALL: Primary | ICD-10-CM

## 2022-04-07 PROCEDURE — 99283 EMERGENCY DEPT VISIT LOW MDM: CPT

## 2022-04-07 RX ORDER — CLINDAMYCIN HYDROCHLORIDE 300 MG/1
300 CAPSULE ORAL 3 TIMES DAILY
Qty: 21 CAPSULE | Refills: 0 | Status: SHIPPED | OUTPATIENT
Start: 2022-04-07 | End: 2022-04-14

## 2022-04-08 NOTE — ED INITIAL ASSESSMENT (HPI)
Patient presents with keloid to chest that feels like it is \"going to burst\" and is infected per patient.    Denies fevers

## 2022-05-13 ENCOUNTER — HOSPITAL ENCOUNTER (OUTPATIENT)
Age: 37
Discharge: HOME | End: 2022-05-13
Attending: EMERGENCY MEDICINE
Payer: COMMERCIAL

## 2022-05-13 VITALS
SYSTOLIC BLOOD PRESSURE: 125 MMHG | TEMPERATURE: 99 F | DIASTOLIC BLOOD PRESSURE: 78 MMHG | OXYGEN SATURATION: 97 % | HEART RATE: 79 BPM | HEIGHT: 71 IN | BODY MASS INDEX: 44.1 KG/M2 | RESPIRATION RATE: 18 BRPM | WEIGHT: 315 LBS

## 2022-05-13 DIAGNOSIS — S01.80XA OPEN WOUND OF FACE, INITIAL ENCOUNTER: Primary | ICD-10-CM

## 2022-05-13 PROCEDURE — 99213 OFFICE O/P EST LOW 20 MIN: CPT

## 2022-05-13 PROCEDURE — 17250 CHEM CAUT OF GRANLTJ TISSUE: CPT

## 2022-06-07 ENCOUNTER — HOSPITAL ENCOUNTER (OUTPATIENT)
Age: 37
Discharge: HOME | End: 2022-06-07
Attending: EMERGENCY MEDICINE
Payer: COMMERCIAL

## 2022-06-07 VITALS
RESPIRATION RATE: 16 BRPM | TEMPERATURE: 97 F | OXYGEN SATURATION: 96 % | HEART RATE: 79 BPM | DIASTOLIC BLOOD PRESSURE: 86 MMHG | SYSTOLIC BLOOD PRESSURE: 131 MMHG

## 2022-06-07 DIAGNOSIS — S39.012A BACK STRAIN, INITIAL ENCOUNTER: Primary | ICD-10-CM

## 2022-06-07 PROCEDURE — 99213 OFFICE O/P EST LOW 20 MIN: CPT

## 2022-06-07 RX ORDER — DIAZEPAM 5 MG/1
5 TABLET ORAL EVERY 6 HOURS PRN
Qty: 20 TABLET | Refills: 0 | Status: SHIPPED | OUTPATIENT
Start: 2022-06-07 | End: 2022-06-14

## 2022-06-07 RX ORDER — IBUPROFEN 600 MG/1
600 TABLET ORAL EVERY 6 HOURS PRN
Qty: 30 TABLET | Refills: 0 | Status: SHIPPED | OUTPATIENT
Start: 2022-06-07 | End: 2022-06-14

## 2022-06-07 RX ORDER — LIDOCAINE 50 MG/G
1 PATCH TOPICAL EVERY 24 HOURS
Qty: 6 PATCH | Refills: 0 | Status: SHIPPED | OUTPATIENT
Start: 2022-06-07

## 2022-06-07 NOTE — ED INITIAL ASSESSMENT (HPI)
Pt comes in with lower back pain since last night. Reports getting out of the car and starting to have pain. Denies known injury, denies radiating pain, numbness, tingling, urinary symptoms, and fever.

## 2022-06-14 ENCOUNTER — OFFICE VISIT (OUTPATIENT)
Dept: FAMILY MEDICINE CLINIC | Facility: CLINIC | Age: 37
End: 2022-06-14
Payer: COMMERCIAL

## 2022-06-14 ENCOUNTER — HOSPITAL ENCOUNTER (OUTPATIENT)
Dept: GENERAL RADIOLOGY | Age: 37
Discharge: HOME | End: 2022-06-14
Attending: FAMILY MEDICINE
Payer: COMMERCIAL

## 2022-06-14 VITALS
SYSTOLIC BLOOD PRESSURE: 131 MMHG | RESPIRATION RATE: 17 BRPM | DIASTOLIC BLOOD PRESSURE: 77 MMHG | WEIGHT: 315 LBS | HEIGHT: 71 IN | HEART RATE: 77 BPM | BODY MASS INDEX: 44.1 KG/M2

## 2022-06-14 DIAGNOSIS — M54.41 ACUTE RIGHT-SIDED LOW BACK PAIN WITH RIGHT-SIDED SCIATICA: ICD-10-CM

## 2022-06-14 DIAGNOSIS — Z13.1 SCREENING FOR DIABETES MELLITUS: ICD-10-CM

## 2022-06-14 DIAGNOSIS — Z13.220 SCREENING CHOLESTEROL LEVEL: ICD-10-CM

## 2022-06-14 DIAGNOSIS — E66.01 CLASS 3 SEVERE OBESITY DUE TO EXCESS CALORIES WITH SERIOUS COMORBIDITY AND BODY MASS INDEX (BMI) OF 45.0 TO 49.9 IN ADULT (HCC): ICD-10-CM

## 2022-06-14 DIAGNOSIS — M54.41 ACUTE RIGHT-SIDED LOW BACK PAIN WITH RIGHT-SIDED SCIATICA: Primary | ICD-10-CM

## 2022-06-14 PROCEDURE — 72110 X-RAY EXAM L-2 SPINE 4/>VWS: CPT | Performed by: FAMILY MEDICINE

## 2022-06-14 PROCEDURE — 3008F BODY MASS INDEX DOCD: CPT | Performed by: FAMILY MEDICINE

## 2022-06-14 PROCEDURE — 3078F DIAST BP <80 MM HG: CPT | Performed by: FAMILY MEDICINE

## 2022-06-14 PROCEDURE — 3075F SYST BP GE 130 - 139MM HG: CPT | Performed by: FAMILY MEDICINE

## 2022-06-14 PROCEDURE — 99214 OFFICE O/P EST MOD 30 MIN: CPT | Performed by: FAMILY MEDICINE

## 2022-06-14 RX ORDER — PREDNISONE 20 MG/1
TABLET ORAL
Qty: 10 TABLET | Refills: 0 | Status: SHIPPED | OUTPATIENT
Start: 2022-06-14

## 2022-10-10 ENCOUNTER — HOSPITAL ENCOUNTER (EMERGENCY)
Facility: HOSPITAL | Age: 37
Discharge: HOME | End: 2022-10-10
Attending: EMERGENCY MEDICINE
Payer: COMMERCIAL

## 2022-10-10 ENCOUNTER — APPOINTMENT (OUTPATIENT)
Dept: CT IMAGING | Facility: HOSPITAL | Age: 37
End: 2022-10-10
Attending: EMERGENCY MEDICINE
Payer: COMMERCIAL

## 2022-10-10 VITALS
OXYGEN SATURATION: 96 % | HEIGHT: 71 IN | DIASTOLIC BLOOD PRESSURE: 85 MMHG | WEIGHT: 315 LBS | RESPIRATION RATE: 19 BRPM | TEMPERATURE: 98 F | BODY MASS INDEX: 44.1 KG/M2 | SYSTOLIC BLOOD PRESSURE: 126 MMHG | HEART RATE: 80 BPM

## 2022-10-10 DIAGNOSIS — L91.0 KELOID SKIN DISORDER: ICD-10-CM

## 2022-10-10 DIAGNOSIS — R07.89 CHEST WALL PAIN: Primary | ICD-10-CM

## 2022-10-10 DIAGNOSIS — J40 BRONCHITIS: ICD-10-CM

## 2022-10-10 LAB
ANION GAP SERPL CALC-SCNC: 6 MMOL/L (ref 0–18)
BASOPHILS # BLD AUTO: 0.1 X10(3) UL (ref 0–0.2)
BASOPHILS NFR BLD AUTO: 1 %
BUN BLD-MCNC: 15 MG/DL (ref 7–18)
BUN/CREAT SERPL: 14.4 (ref 10–20)
CALCIUM BLD-MCNC: 8.2 MG/DL (ref 8.5–10.1)
CHLORIDE SERPL-SCNC: 109 MMOL/L (ref 98–112)
CK SERPL-CCNC: 293 U/L
CO2 SERPL-SCNC: 27 MMOL/L (ref 21–32)
CREAT BLD-MCNC: 1.04 MG/DL
DEPRECATED RDW RBC AUTO: 45.1 FL (ref 35.1–46.3)
EOSINOPHIL # BLD AUTO: 0.42 X10(3) UL (ref 0–0.7)
EOSINOPHIL NFR BLD AUTO: 4.1 %
ERYTHROCYTE [DISTWIDTH] IN BLOOD BY AUTOMATED COUNT: 13.7 % (ref 11–15)
GFR SERPLBLD BASED ON 1.73 SQ M-ARVRAT: 95 ML/MIN/1.73M2 (ref 60–?)
GLUCOSE BLD-MCNC: 115 MG/DL (ref 70–99)
HCT VFR BLD AUTO: 45.4 %
HGB BLD-MCNC: 14.4 G/DL
IMM GRANULOCYTES # BLD AUTO: 0.05 X10(3) UL (ref 0–1)
IMM GRANULOCYTES NFR BLD: 0.5 %
LYMPHOCYTES # BLD AUTO: 2.79 X10(3) UL (ref 1–4)
LYMPHOCYTES NFR BLD AUTO: 27.4 %
MCH RBC QN AUTO: 28.6 PG (ref 26–34)
MCHC RBC AUTO-ENTMCNC: 31.7 G/DL (ref 31–37)
MCV RBC AUTO: 90.1 FL
MONOCYTES # BLD AUTO: 0.76 X10(3) UL (ref 0.1–1)
MONOCYTES NFR BLD AUTO: 7.5 %
NEUTROPHILS # BLD AUTO: 6.05 X10 (3) UL (ref 1.5–7.7)
NEUTROPHILS # BLD AUTO: 6.05 X10(3) UL (ref 1.5–7.7)
NEUTROPHILS NFR BLD AUTO: 59.5 %
OSMOLALITY SERPL CALC.SUM OF ELEC: 296 MOSM/KG (ref 275–295)
PLATELET # BLD AUTO: 288 10(3)UL (ref 150–450)
POTASSIUM SERPL-SCNC: 3.6 MMOL/L (ref 3.5–5.1)
RBC # BLD AUTO: 5.04 X10(6)UL
SARS-COV-2 RNA RESP QL NAA+PROBE: NOT DETECTED
SODIUM SERPL-SCNC: 142 MMOL/L (ref 136–145)
TROPONIN I HIGH SENSITIVITY: 8 NG/L
WBC # BLD AUTO: 10.2 X10(3) UL (ref 4–11)

## 2022-10-10 PROCEDURE — 71260 CT THORAX DX C+: CPT | Performed by: EMERGENCY MEDICINE

## 2022-10-10 PROCEDURE — 99285 EMERGENCY DEPT VISIT HI MDM: CPT

## 2022-10-10 PROCEDURE — 84484 ASSAY OF TROPONIN QUANT: CPT | Performed by: EMERGENCY MEDICINE

## 2022-10-10 PROCEDURE — 94640 AIRWAY INHALATION TREATMENT: CPT

## 2022-10-10 PROCEDURE — 85025 COMPLETE CBC W/AUTO DIFF WBC: CPT | Performed by: EMERGENCY MEDICINE

## 2022-10-10 PROCEDURE — 82550 ASSAY OF CK (CPK): CPT | Performed by: EMERGENCY MEDICINE

## 2022-10-10 PROCEDURE — 80048 BASIC METABOLIC PNL TOTAL CA: CPT | Performed by: EMERGENCY MEDICINE

## 2022-10-10 PROCEDURE — 96375 TX/PRO/DX INJ NEW DRUG ADDON: CPT

## 2022-10-10 PROCEDURE — 93010 ELECTROCARDIOGRAM REPORT: CPT | Performed by: EMERGENCY MEDICINE

## 2022-10-10 PROCEDURE — 93005 ELECTROCARDIOGRAM TRACING: CPT

## 2022-10-10 PROCEDURE — 96374 THER/PROPH/DIAG INJ IV PUSH: CPT

## 2022-10-10 RX ORDER — KETOROLAC TROMETHAMINE 15 MG/ML
15 INJECTION, SOLUTION INTRAMUSCULAR; INTRAVENOUS ONCE
Status: COMPLETED | OUTPATIENT
Start: 2022-10-10 | End: 2022-10-10

## 2022-10-10 RX ORDER — KETOROLAC TROMETHAMINE 10 MG/1
10 TABLET, FILM COATED ORAL EVERY 6 HOURS PRN
Qty: 20 TABLET | Refills: 0 | Status: SHIPPED | OUTPATIENT
Start: 2022-10-10 | End: 2022-10-15

## 2022-10-10 RX ORDER — ORPHENADRINE CITRATE 30 MG/ML
30 INJECTION INTRAMUSCULAR; INTRAVENOUS ONCE
Status: COMPLETED | OUTPATIENT
Start: 2022-10-10 | End: 2022-10-10

## 2022-10-10 RX ORDER — MORPHINE SULFATE 4 MG/ML
4 INJECTION, SOLUTION INTRAMUSCULAR; INTRAVENOUS ONCE
Status: COMPLETED | OUTPATIENT
Start: 2022-10-10 | End: 2022-10-10

## 2022-10-10 RX ORDER — IPRATROPIUM BROMIDE AND ALBUTEROL SULFATE 2.5; .5 MG/3ML; MG/3ML
3 SOLUTION RESPIRATORY (INHALATION) ONCE
Status: COMPLETED | OUTPATIENT
Start: 2022-10-10 | End: 2022-10-10

## 2022-10-10 RX ORDER — ALBUTEROL SULFATE 90 UG/1
2 AEROSOL, METERED RESPIRATORY (INHALATION) EVERY 4 HOURS PRN
Qty: 1 EACH | Refills: 0 | Status: SHIPPED | OUTPATIENT
Start: 2022-10-10 | End: 2022-11-09

## 2022-10-10 RX ORDER — ORPHENADRINE CITRATE 100 MG/1
100 TABLET, EXTENDED RELEASE ORAL 2 TIMES DAILY
Qty: 20 TABLET | Refills: 0 | Status: SHIPPED | OUTPATIENT
Start: 2022-10-10 | End: 2022-10-20

## 2022-10-10 NOTE — ED INITIAL ASSESSMENT (HPI)
44y M to ED via personal car with c/o chest pain. Patient reports midsternal, constant \"ripping\" sensation since Friday. Area is tender to touch. Denies SOB, nausea, dizziness.

## 2022-10-10 NOTE — ED QUICK NOTES
Pt states since Friday has been having mid sternal \"ripping\" pain and URI symptoms, including a cough. Sternum is painful to palpation. Denies shortness of breath, nausea, vomiting, fever.

## 2022-10-18 ENCOUNTER — OFFICE VISIT (OUTPATIENT)
Dept: FAMILY MEDICINE CLINIC | Facility: CLINIC | Age: 37
End: 2022-10-18
Payer: COMMERCIAL

## 2022-10-18 VITALS
HEIGHT: 71 IN | HEART RATE: 97 BPM | BODY MASS INDEX: 44.1 KG/M2 | SYSTOLIC BLOOD PRESSURE: 129 MMHG | DIASTOLIC BLOOD PRESSURE: 87 MMHG | WEIGHT: 315 LBS

## 2022-10-18 DIAGNOSIS — R07.89 CHEST WALL PAIN: ICD-10-CM

## 2022-10-18 DIAGNOSIS — J39.9 UPPER RESPIRATORY DISEASE: Primary | ICD-10-CM

## 2022-10-18 PROCEDURE — 3008F BODY MASS INDEX DOCD: CPT | Performed by: PHYSICIAN ASSISTANT

## 2022-10-18 PROCEDURE — 3074F SYST BP LT 130 MM HG: CPT | Performed by: PHYSICIAN ASSISTANT

## 2022-10-18 PROCEDURE — 3079F DIAST BP 80-89 MM HG: CPT | Performed by: PHYSICIAN ASSISTANT

## 2022-10-18 PROCEDURE — 99213 OFFICE O/P EST LOW 20 MIN: CPT | Performed by: PHYSICIAN ASSISTANT

## 2022-10-18 RX ORDER — NAPROXEN 500 MG/1
500 TABLET ORAL 2 TIMES DAILY WITH MEALS
Qty: 60 TABLET | Refills: 0 | Status: SHIPPED | OUTPATIENT
Start: 2022-10-18

## 2022-10-19 ENCOUNTER — TELEPHONE (OUTPATIENT)
Dept: FAMILY MEDICINE CLINIC | Facility: CLINIC | Age: 37
End: 2022-10-19

## 2022-10-19 LAB — SARS-COV-2 RNA RESP QL NAA+PROBE: NOT DETECTED

## 2022-10-19 NOTE — TELEPHONE ENCOUNTER
[employer] on the phone will like to receive a call back to get informed of the medication the pt was prescribed at his last office visit.  Please advise    Claim number [id number]

## 2022-10-19 NOTE — TELEPHONE ENCOUNTER
Fax received at [address] [place], [place] [address] today from [employer] for Short term disability forms to be filled out Forms emailed to the forms dept and sent via inter-office.

## 2022-10-19 NOTE — TELEPHONE ENCOUNTER
Called [employer], spoke with Eulogio Montano and provided claim number   Eulogio Montano informed of RX for Naproxen and heide Lema verbalizes understanding

## 2022-10-24 NOTE — TELEPHONE ENCOUNTER
John,     Please sign off on 2 forms:  FMLA and short term disability - upper respiratory disease  10/10/22-10/19/22  -Highlight the patient and hit \"Chart\" button. -In Chart Review, w/in the Encounter tab - click 1 time on the Telephone call encounter for 10/19/22 Scroll down the telephone encounter.  -Click \"scan on\" blue Hyperlink under \"Media\" heading for Disab Isabel Even 10/24/22, FMLA Isabel Even 10/24/22  w/in the telephone enc.  -Click on Acknowledge button at the bottom right corner and left-click onto image, signature stamp appears and drag signature to Provider signature line. Stamp will turn blue. Close window.      Thank you,    Marcos Valdez

## 2023-04-15 ENCOUNTER — HOSPITAL ENCOUNTER (OUTPATIENT)
Age: 38
Discharge: HOME | End: 2023-04-15
Payer: COMMERCIAL

## 2023-04-15 VITALS
OXYGEN SATURATION: 95 % | RESPIRATION RATE: 20 BRPM | HEART RATE: 100 BPM | TEMPERATURE: 98 F | DIASTOLIC BLOOD PRESSURE: 69 MMHG | SYSTOLIC BLOOD PRESSURE: 126 MMHG

## 2023-04-15 DIAGNOSIS — M54.50 ACUTE MIDLINE LOW BACK PAIN WITHOUT SCIATICA: Primary | ICD-10-CM

## 2023-04-15 PROCEDURE — 99213 OFFICE O/P EST LOW 20 MIN: CPT

## 2023-04-15 RX ORDER — LIDOCAINE 50 MG/G
1 PATCH TOPICAL EVERY 24 HOURS
Qty: 15 PATCH | Refills: 0 | Status: SHIPPED | OUTPATIENT
Start: 2023-04-15

## 2023-04-15 RX ORDER — IBUPROFEN 600 MG/1
600 TABLET ORAL EVERY 6 HOURS PRN
Qty: 30 TABLET | Refills: 0 | Status: SHIPPED | OUTPATIENT
Start: 2023-04-15

## 2023-04-15 RX ORDER — CYCLOBENZAPRINE HCL 10 MG
10 TABLET ORAL 3 TIMES DAILY PRN
Qty: 20 TABLET | Refills: 0 | Status: SHIPPED | OUTPATIENT
Start: 2023-04-15 | End: 2023-04-22

## 2023-04-15 NOTE — ED INITIAL ASSESSMENT (HPI)
Mid l low back pain since Monday, denies trauma or injury,non radiating, no urinary symptoms, no numbness or tingling to lower extremities, no bowel or bladder incontinence

## 2023-04-15 NOTE — DISCHARGE INSTRUCTIONS
Follow up with your doctor as needed. Take flexeril 10 mg two to three times a day for muscle relaxer/pain. DO NOT TAKE IF working, driving or drinking alcohol. This can make you sleepy or dizzy. Take 1 tablet before bedtime if working/driving. Take ibuprofen 600mg every 6 hours for pain. Use lidocaine patch 12 hours on 12 hours off. IF not covered at pharmacy there is over the counter Lidocaine 4% patch or creams that work just as well. Use heating pad to area of pain to help with muscle relaxation when not using patches. Increase water hydration, rest. No heavy lifting while muscle recover.     RETURN TO ED for worsening pain, dizziness, chest pain or shortness of breath, incontinence, numbness around pelvis

## 2023-04-19 ENCOUNTER — HOSPITAL ENCOUNTER (EMERGENCY)
Facility: HOSPITAL | Age: 38
Discharge: HOME | End: 2023-04-20
Attending: STUDENT IN AN ORGANIZED HEALTH CARE EDUCATION/TRAINING PROGRAM
Payer: COMMERCIAL

## 2023-04-19 DIAGNOSIS — R10.13 EPIGASTRIC PAIN: Primary | ICD-10-CM

## 2023-04-19 LAB
ALBUMIN SERPL-MCNC: 3.5 G/DL (ref 3.4–5)
ALBUMIN/GLOB SERPL: 0.9 {RATIO} (ref 1–2)
ALP LIVER SERPL-CCNC: 85 U/L
ALT SERPL-CCNC: 31 U/L
ANION GAP SERPL CALC-SCNC: 3 MMOL/L (ref 0–18)
AST SERPL-CCNC: 52 U/L (ref 15–37)
BASOPHILS # BLD AUTO: 0.1 X10(3) UL (ref 0–0.2)
BASOPHILS NFR BLD AUTO: 1 %
BILIRUB SERPL-MCNC: 0.4 MG/DL (ref 0.1–2)
BUN BLD-MCNC: 11 MG/DL (ref 7–18)
BUN/CREAT SERPL: 10.3 (ref 10–20)
CALCIUM BLD-MCNC: 8.6 MG/DL (ref 8.5–10.1)
CHLORIDE SERPL-SCNC: 109 MMOL/L (ref 98–112)
CO2 SERPL-SCNC: 26 MMOL/L (ref 21–32)
CREAT BLD-MCNC: 1.07 MG/DL
DEPRECATED RDW RBC AUTO: 44.2 FL (ref 35.1–46.3)
EOSINOPHIL # BLD AUTO: 0.62 X10(3) UL (ref 0–0.7)
EOSINOPHIL NFR BLD AUTO: 6.4 %
ERYTHROCYTE [DISTWIDTH] IN BLOOD BY AUTOMATED COUNT: 14 % (ref 11–15)
GFR SERPLBLD BASED ON 1.73 SQ M-ARVRAT: 91 ML/MIN/1.73M2 (ref 60–?)
GLOBULIN PLAS-MCNC: 4.1 G/DL (ref 2.8–4.4)
GLUCOSE BLD-MCNC: 97 MG/DL (ref 70–99)
HCT VFR BLD AUTO: 43.8 %
HGB BLD-MCNC: 14.2 G/DL
IMM GRANULOCYTES # BLD AUTO: 0.05 X10(3) UL (ref 0–1)
IMM GRANULOCYTES NFR BLD: 0.5 %
LIPASE SERPL-CCNC: 22 U/L (ref 13–75)
LYMPHOCYTES # BLD AUTO: 2.98 X10(3) UL (ref 1–4)
LYMPHOCYTES NFR BLD AUTO: 30.6 %
MCH RBC QN AUTO: 28.1 PG (ref 26–34)
MCHC RBC AUTO-ENTMCNC: 32.4 G/DL (ref 31–37)
MCV RBC AUTO: 86.7 FL
MONOCYTES # BLD AUTO: 0.64 X10(3) UL (ref 0.1–1)
MONOCYTES NFR BLD AUTO: 6.6 %
NEUTROPHILS # BLD AUTO: 5.35 X10 (3) UL (ref 1.5–7.7)
NEUTROPHILS # BLD AUTO: 5.35 X10(3) UL (ref 1.5–7.7)
NEUTROPHILS NFR BLD AUTO: 54.9 %
OSMOLALITY SERPL CALC.SUM OF ELEC: 285 MOSM/KG (ref 275–295)
PLATELET # BLD AUTO: 282 10(3)UL (ref 150–450)
POTASSIUM SERPL-SCNC: 4.5 MMOL/L (ref 3.5–5.1)
PROT SERPL-MCNC: 7.6 G/DL (ref 6.4–8.2)
RBC # BLD AUTO: 5.05 X10(6)UL
SODIUM SERPL-SCNC: 138 MMOL/L (ref 136–145)
WBC # BLD AUTO: 9.7 X10(3) UL (ref 4–11)

## 2023-04-19 PROCEDURE — 99285 EMERGENCY DEPT VISIT HI MDM: CPT

## 2023-04-19 PROCEDURE — 99284 EMERGENCY DEPT VISIT MOD MDM: CPT

## 2023-04-19 PROCEDURE — 80053 COMPREHEN METABOLIC PANEL: CPT

## 2023-04-19 PROCEDURE — 85025 COMPLETE CBC W/AUTO DIFF WBC: CPT

## 2023-04-19 PROCEDURE — 80053 COMPREHEN METABOLIC PANEL: CPT | Performed by: STUDENT IN AN ORGANIZED HEALTH CARE EDUCATION/TRAINING PROGRAM

## 2023-04-19 PROCEDURE — 85025 COMPLETE CBC W/AUTO DIFF WBC: CPT | Performed by: STUDENT IN AN ORGANIZED HEALTH CARE EDUCATION/TRAINING PROGRAM

## 2023-04-19 PROCEDURE — 83690 ASSAY OF LIPASE: CPT | Performed by: STUDENT IN AN ORGANIZED HEALTH CARE EDUCATION/TRAINING PROGRAM

## 2023-04-19 PROCEDURE — 96375 TX/PRO/DX INJ NEW DRUG ADDON: CPT

## 2023-04-19 PROCEDURE — 83690 ASSAY OF LIPASE: CPT

## 2023-04-19 PROCEDURE — 96374 THER/PROPH/DIAG INJ IV PUSH: CPT

## 2023-04-19 RX ORDER — FAMOTIDINE 10 MG/ML
20 INJECTION, SOLUTION INTRAVENOUS ONCE
Status: COMPLETED | OUTPATIENT
Start: 2023-04-19 | End: 2023-04-20

## 2023-04-19 RX ORDER — ONDANSETRON 2 MG/ML
4 INJECTION INTRAMUSCULAR; INTRAVENOUS ONCE
Status: COMPLETED | OUTPATIENT
Start: 2023-04-19 | End: 2023-04-19

## 2023-04-20 ENCOUNTER — APPOINTMENT (OUTPATIENT)
Dept: ULTRASOUND IMAGING | Facility: HOSPITAL | Age: 38
End: 2023-04-20
Attending: STUDENT IN AN ORGANIZED HEALTH CARE EDUCATION/TRAINING PROGRAM
Payer: COMMERCIAL

## 2023-04-20 VITALS
DIASTOLIC BLOOD PRESSURE: 83 MMHG | RESPIRATION RATE: 18 BRPM | SYSTOLIC BLOOD PRESSURE: 111 MMHG | OXYGEN SATURATION: 92 % | WEIGHT: 315 LBS | HEART RATE: 82 BPM | TEMPERATURE: 98 F | BODY MASS INDEX: 44.1 KG/M2 | HEIGHT: 71 IN

## 2023-04-20 PROCEDURE — 76705 ECHO EXAM OF ABDOMEN: CPT | Performed by: STUDENT IN AN ORGANIZED HEALTH CARE EDUCATION/TRAINING PROGRAM

## 2023-04-20 PROCEDURE — S0028 INJECTION, FAMOTIDINE, 20 MG: HCPCS | Performed by: STUDENT IN AN ORGANIZED HEALTH CARE EDUCATION/TRAINING PROGRAM

## 2023-04-20 RX ORDER — FAMOTIDINE 20 MG/1
20 TABLET, FILM COATED ORAL 2 TIMES DAILY PRN
Qty: 30 TABLET | Refills: 0 | Status: SHIPPED | OUTPATIENT
Start: 2023-04-20 | End: 2023-05-20

## 2023-04-20 RX ORDER — SUCRALFATE 1 G/1
1 TABLET ORAL
Qty: 30 TABLET | Refills: 0 | Status: SHIPPED | OUTPATIENT
Start: 2023-04-20

## 2023-04-20 NOTE — ED INITIAL ASSESSMENT (HPI)
Pt reports epigastric pain since Monday. Denies n/v. Reports diarrhea. Denies cp/ hx of heart disease. Denies fevers.

## 2023-06-06 ENCOUNTER — HOSPITAL ENCOUNTER (OUTPATIENT)
Age: 38
Discharge: HOME | End: 2023-06-06
Attending: EMERGENCY MEDICINE
Payer: COMMERCIAL

## 2023-06-06 VITALS
DIASTOLIC BLOOD PRESSURE: 80 MMHG | OXYGEN SATURATION: 100 % | SYSTOLIC BLOOD PRESSURE: 140 MMHG | RESPIRATION RATE: 20 BRPM | HEART RATE: 80 BPM | TEMPERATURE: 99 F

## 2023-06-06 DIAGNOSIS — L91.0 KELOID: ICD-10-CM

## 2023-06-06 DIAGNOSIS — L02.91 ABSCESS: Primary | ICD-10-CM

## 2023-06-06 PROCEDURE — 99214 OFFICE O/P EST MOD 30 MIN: CPT

## 2023-06-06 PROCEDURE — 99213 OFFICE O/P EST LOW 20 MIN: CPT

## 2023-06-06 RX ORDER — CLINDAMYCIN HYDROCHLORIDE 300 MG/1
300 CAPSULE ORAL 3 TIMES DAILY
Qty: 30 CAPSULE | Refills: 0 | Status: SHIPPED | OUTPATIENT
Start: 2023-06-06 | End: 2023-06-16

## 2023-06-06 NOTE — ED INITIAL ASSESSMENT (HPI)
Presents with \"oozing\" and drainage from keloid to right side of face and jaw. States he had it removed a couple years ago and it came right back. Now increased in size and irritation. No fever.

## 2023-06-22 NOTE — LETTER
AUTHORIZATION FOR SURGICAL OPERATION OR OTHER PROCEDURE    1. I hereby authorize Dr. Mari Anderson, and [facility] staff assigned to my case to perform the following operation and/or procedure at the [facility] site:  Correction of ingrown toe nail left big toe   _______________________________________________________________________________________________      _______________________________________________________________________________________________    2.  My physician has explained the nature and purpose of the operation or other procedure, possible alternative methods of treatment, the risks involved, and the possibility of complication to me.  I acknowledge that no guarantee has been made as to the result that may be obtained.  3.  I recognize that, during the course of this operation, or other procedure, unforseen conditions may necessitate additional or different procedure than those listed above.  I, therefore, further authorize and request that the above named physician, his/her physician assistants or designees perform such procedures as are, in his/her professional opinion, necessary and desirable.  4.  Any tissue or organs removed in the operation or other procedure may be disposed of by and at the discretion of the [facility] and [facility].  5.  I understand that in the event of a medical emergency, I will be transported by local paramedics to [facility] or other hospital emergency department.  6.  I certify that I have read and fully understand the above consent to operation and/or other procedure.    7.  I acknowledge that my physician has explained sedation/analgesia administration to me including the risks and benefits.  I consent to the administration of sedation/analgesia as may be necessary or desirable in the judgement of my physician.    Witness signature: ___________________________________________________ Date:   It was a pleasure taking care of you at [facility] Emergency Department today. We know that when you come to [facility], you are entrusting us with your health, comfort, and safety. Our physicians and nurses honor that trust, and we truly appreciate the opportunity to care for you and your loved ones. We also value your feedback. If you receive a survey about your Emergency Department experience today, please fill it out. We care about our patients' feedback, and we listen to what you have to say. Thank you! ______/______/_____                    Time:  ________ A.M.  P.M.       Patient Name:  ______________________________________________________  (please print)      Patient signature:  ___________________________________________________             Relationship to Patient:           []  Parent    Responsible person                          []  Spouse  In case of minor or                    [] Other  _____________   Incompetent name:  __________________________________________________                               (please print)      _____________      Responsible person  In case of minor or  Incompetent signature:  _______________________________________________    Statement of Physician  My signature below affirms that prior to the time of the procedure, I have explained to the patient and/or his/her guardian, the risks and benefits involved in the proposed treatment and any reasonable alternative to the proposed treatment.  I have also explained the risks and benefits involved in the refusal of the proposed treatment and have answered the patient's questions.                        Date:  ______/______/_______  Provider                      Signature:  __________________________________________________________       Time:  ___________ A.M    P.M.

## 2023-06-27 ENCOUNTER — OFFICE VISIT (OUTPATIENT)
Dept: FAMILY MEDICINE CLINIC | Facility: CLINIC | Age: 38
End: 2023-06-27

## 2023-06-27 VITALS
RESPIRATION RATE: 18 BRPM | SYSTOLIC BLOOD PRESSURE: 138 MMHG | WEIGHT: 315 LBS | HEART RATE: 80 BPM | DIASTOLIC BLOOD PRESSURE: 82 MMHG | BODY MASS INDEX: 44.1 KG/M2 | HEIGHT: 71 IN

## 2023-06-27 DIAGNOSIS — R73.09 ELEVATED GLUCOSE: ICD-10-CM

## 2023-06-27 DIAGNOSIS — Z13.220 LIPID SCREENING: ICD-10-CM

## 2023-06-27 DIAGNOSIS — Z13.0 SCREENING FOR DEFICIENCY ANEMIA: ICD-10-CM

## 2023-06-27 DIAGNOSIS — R30.0 DYSURIA: ICD-10-CM

## 2023-06-27 DIAGNOSIS — Z13.29 THYROID DISORDER SCREEN: ICD-10-CM

## 2023-06-27 DIAGNOSIS — L91.0 KELOID OF SKIN: ICD-10-CM

## 2023-06-27 DIAGNOSIS — Z00.00 WELLNESS EXAMINATION: Primary | ICD-10-CM

## 2023-06-27 LAB
APPEARANCE: CLEAR
BILIRUBIN: NEGATIVE
GLUCOSE (URINE DIPSTICK): NEGATIVE MG/DL
KETONES (URINE DIPSTICK): NEGATIVE MG/DL
LEUKOCYTES: NEGATIVE
MULTISTIX LOT#: ABNORMAL NUMERIC
NITRITE, URINE: NEGATIVE
PH, URINE: 6.5 (ref 4.5–8)
SPECIFIC GRAVITY: 1.02 (ref 1.02–1.03)
UROBILINOGEN,SEMI-QN: 1 MG/DL (ref 0–1.9)

## 2023-06-27 PROCEDURE — 81002 URINALYSIS NONAUTO W/O SCOPE: CPT | Performed by: FAMILY MEDICINE

## 2023-06-27 PROCEDURE — 3075F SYST BP GE 130 - 139MM HG: CPT | Performed by: FAMILY MEDICINE

## 2023-06-27 PROCEDURE — 3079F DIAST BP 80-89 MM HG: CPT | Performed by: FAMILY MEDICINE

## 2023-06-27 PROCEDURE — 3008F BODY MASS INDEX DOCD: CPT | Performed by: FAMILY MEDICINE

## 2023-06-27 PROCEDURE — 99213 OFFICE O/P EST LOW 20 MIN: CPT | Performed by: FAMILY MEDICINE

## 2023-06-27 PROCEDURE — 99395 PREV VISIT EST AGE 18-39: CPT | Performed by: FAMILY MEDICINE

## 2023-06-27 RX ORDER — PHENAZOPYRIDINE HYDROCHLORIDE 200 MG/1
200 TABLET, FILM COATED ORAL 3 TIMES DAILY PRN
Qty: 10 TABLET | Refills: 0 | Status: SHIPPED | OUTPATIENT
Start: 2023-06-27

## 2023-06-27 RX ORDER — DESOXIMETASONE 2.5 MG/G
CREAM TOPICAL 2 TIMES DAILY
COMMUNITY
Start: 2023-06-08

## 2023-06-27 RX ORDER — HALOBETASOL PROPIONATE 0.05 %
OINTMENT (GRAM) TOPICAL 2 TIMES DAILY
COMMUNITY
Start: 2023-06-08

## 2023-06-27 RX ORDER — CICLOPIROX 1 G/100ML
SHAMPOO TOPICAL
COMMUNITY
Start: 2023-06-08

## 2023-06-27 RX ORDER — FLUOCINOLONE ACETONIDE 0.11 MG/ML
OIL AURICULAR (OTIC)
COMMUNITY
Start: 2023-06-08

## 2023-06-28 ENCOUNTER — LAB ENCOUNTER (OUTPATIENT)
Dept: LAB | Age: 38
End: 2023-06-28
Attending: FAMILY MEDICINE
Payer: COMMERCIAL

## 2023-06-28 DIAGNOSIS — R73.09 ELEVATED GLUCOSE: ICD-10-CM

## 2023-06-28 DIAGNOSIS — Z13.29 THYROID DISORDER SCREEN: ICD-10-CM

## 2023-06-28 DIAGNOSIS — Z00.00 WELLNESS EXAMINATION: ICD-10-CM

## 2023-06-28 DIAGNOSIS — Z13.220 LIPID SCREENING: ICD-10-CM

## 2023-06-28 DIAGNOSIS — Z13.0 SCREENING FOR DEFICIENCY ANEMIA: ICD-10-CM

## 2023-06-28 LAB
ALBUMIN SERPL-MCNC: 3.7 G/DL (ref 3.4–5)
ALBUMIN/GLOB SERPL: 1 {RATIO} (ref 1–2)
ALP LIVER SERPL-CCNC: 93 U/L
ALT SERPL-CCNC: 35 U/L
ANION GAP SERPL CALC-SCNC: 5 MMOL/L (ref 0–18)
AST SERPL-CCNC: 24 U/L (ref 15–37)
BASOPHILS # BLD AUTO: 0.12 X10(3) UL (ref 0–0.2)
BASOPHILS NFR BLD AUTO: 1.4 %
BILIRUB SERPL-MCNC: 0.5 MG/DL (ref 0.1–2)
BILIRUB UR QL: NEGATIVE
BUN BLD-MCNC: 11 MG/DL (ref 7–18)
BUN/CREAT SERPL: 11.2 (ref 10–20)
C TRACH DNA SPEC QL NAA+PROBE: NEGATIVE
CALCIUM BLD-MCNC: 8.7 MG/DL (ref 8.5–10.1)
CHLORIDE SERPL-SCNC: 109 MMOL/L (ref 98–112)
CHOLEST SERPL-MCNC: 195 MG/DL (ref ?–200)
CLARITY UR: CLEAR
CO2 SERPL-SCNC: 27 MMOL/L (ref 21–32)
COLOR UR: YELLOW
CREAT BLD-MCNC: 0.98 MG/DL
DEPRECATED RDW RBC AUTO: 42.2 FL (ref 35.1–46.3)
EOSINOPHIL # BLD AUTO: 0.46 X10(3) UL (ref 0–0.7)
EOSINOPHIL NFR BLD AUTO: 5.2 %
ERYTHROCYTE [DISTWIDTH] IN BLOOD BY AUTOMATED COUNT: 13.3 % (ref 11–15)
EST. AVERAGE GLUCOSE BLD GHB EST-MCNC: 131 MG/DL (ref 68–126)
FASTING PATIENT LIPID ANSWER: YES
FASTING STATUS PATIENT QL REPORTED: YES
GFR SERPLBLD BASED ON 1.73 SQ M-ARVRAT: 101 ML/MIN/1.73M2 (ref 60–?)
GLOBULIN PLAS-MCNC: 3.8 G/DL (ref 2.8–4.4)
GLUCOSE BLD-MCNC: 99 MG/DL (ref 70–99)
GLUCOSE UR-MCNC: NORMAL MG/DL
HBA1C MFR BLD: 6.2 % (ref ?–5.7)
HCT VFR BLD AUTO: 45.3 %
HDLC SERPL-MCNC: 36 MG/DL (ref 40–59)
HGB BLD-MCNC: 14.7 G/DL
HGB UR QL STRIP.AUTO: NEGATIVE
IMM GRANULOCYTES # BLD AUTO: 0.03 X10(3) UL (ref 0–1)
IMM GRANULOCYTES NFR BLD: 0.3 %
KETONES UR-MCNC: NEGATIVE MG/DL
LDLC SERPL CALC-MCNC: 145 MG/DL (ref ?–100)
LEUKOCYTE ESTERASE UR QL STRIP.AUTO: NEGATIVE
LYMPHOCYTES # BLD AUTO: 2.44 X10(3) UL (ref 1–4)
LYMPHOCYTES NFR BLD AUTO: 27.5 %
MCH RBC QN AUTO: 28.2 PG (ref 26–34)
MCHC RBC AUTO-ENTMCNC: 32.5 G/DL (ref 31–37)
MCV RBC AUTO: 86.9 FL
MONOCYTES # BLD AUTO: 0.56 X10(3) UL (ref 0.1–1)
MONOCYTES NFR BLD AUTO: 6.3 %
N GONORRHOEA DNA SPEC QL NAA+PROBE: NEGATIVE
NEUTROPHILS # BLD AUTO: 5.27 X10 (3) UL (ref 1.5–7.7)
NEUTROPHILS # BLD AUTO: 5.27 X10(3) UL (ref 1.5–7.7)
NEUTROPHILS NFR BLD AUTO: 59.3 %
NITRITE UR QL STRIP.AUTO: NEGATIVE
NONHDLC SERPL-MCNC: 159 MG/DL (ref ?–130)
OSMOLALITY SERPL CALC.SUM OF ELEC: 291 MOSM/KG (ref 275–295)
PH UR: 6.5 [PH] (ref 5–8)
PLATELET # BLD AUTO: 305 10(3)UL (ref 150–450)
POTASSIUM SERPL-SCNC: 3.8 MMOL/L (ref 3.5–5.1)
PROT SERPL-MCNC: 7.5 G/DL (ref 6.4–8.2)
RBC # BLD AUTO: 5.21 X10(6)UL
SODIUM SERPL-SCNC: 141 MMOL/L (ref 136–145)
SP GR UR STRIP: 1.03 (ref 1–1.03)
TRIGL SERPL-MCNC: 75 MG/DL (ref 30–149)
TSI SER-ACNC: 1.13 MIU/ML (ref 0.36–3.74)
UROBILINOGEN UR STRIP-ACNC: NORMAL
VLDLC SERPL CALC-MCNC: 14 MG/DL (ref 0–30)
WBC # BLD AUTO: 8.9 X10(3) UL (ref 4–11)

## 2023-06-28 PROCEDURE — 80053 COMPREHEN METABOLIC PANEL: CPT

## 2023-06-28 PROCEDURE — 85025 COMPLETE CBC W/AUTO DIFF WBC: CPT

## 2023-06-28 PROCEDURE — 36415 COLL VENOUS BLD VENIPUNCTURE: CPT

## 2023-06-28 PROCEDURE — 80061 LIPID PANEL: CPT

## 2023-06-28 PROCEDURE — 83036 HEMOGLOBIN GLYCOSYLATED A1C: CPT

## 2023-06-28 PROCEDURE — 84443 ASSAY THYROID STIM HORMONE: CPT

## 2023-06-28 RX ORDER — AMOXICILLIN 500 MG/1
500 CAPSULE ORAL 3 TIMES DAILY
Qty: 21 CAPSULE | Refills: 0 | Status: SHIPPED | OUTPATIENT
Start: 2023-06-28 | End: 2023-07-05

## 2023-07-19 ENCOUNTER — OFFICE VISIT (OUTPATIENT)
Dept: FAMILY MEDICINE CLINIC | Facility: CLINIC | Age: 38
End: 2023-07-19

## 2023-07-19 VITALS
BODY MASS INDEX: 44.1 KG/M2 | HEIGHT: 71 IN | WEIGHT: 315 LBS | HEART RATE: 81 BPM | SYSTOLIC BLOOD PRESSURE: 130 MMHG | RESPIRATION RATE: 17 BRPM | DIASTOLIC BLOOD PRESSURE: 80 MMHG

## 2023-07-19 DIAGNOSIS — R03.0 ELEVATED BLOOD PRESSURE READING WITHOUT DIAGNOSIS OF HYPERTENSION: ICD-10-CM

## 2023-07-19 DIAGNOSIS — E78.5 DYSLIPIDEMIA: ICD-10-CM

## 2023-07-19 DIAGNOSIS — R73.03 PREDIABETES: Primary | ICD-10-CM

## 2023-07-19 PROCEDURE — 99212 OFFICE O/P EST SF 10 MIN: CPT | Performed by: FAMILY MEDICINE

## 2023-07-19 PROCEDURE — 3075F SYST BP GE 130 - 139MM HG: CPT | Performed by: FAMILY MEDICINE

## 2023-07-19 PROCEDURE — 3079F DIAST BP 80-89 MM HG: CPT | Performed by: FAMILY MEDICINE

## 2023-07-19 PROCEDURE — 3008F BODY MASS INDEX DOCD: CPT | Performed by: FAMILY MEDICINE

## 2023-09-08 ENCOUNTER — HOSPITAL ENCOUNTER (OUTPATIENT)
Age: 38
Discharge: HOME | End: 2023-09-08
Attending: EMERGENCY MEDICINE
Payer: COMMERCIAL

## 2023-09-08 VITALS
RESPIRATION RATE: 20 BRPM | DIASTOLIC BLOOD PRESSURE: 85 MMHG | TEMPERATURE: 99 F | HEART RATE: 95 BPM | OXYGEN SATURATION: 96 % | SYSTOLIC BLOOD PRESSURE: 140 MMHG

## 2023-09-08 DIAGNOSIS — J06.9 VIRAL URI: Primary | ICD-10-CM

## 2023-09-08 LAB — SARS-COV-2 RNA RESP QL NAA+PROBE: NOT DETECTED

## 2023-09-08 PROCEDURE — 99212 OFFICE O/P EST SF 10 MIN: CPT

## 2023-09-08 PROCEDURE — 99213 OFFICE O/P EST LOW 20 MIN: CPT

## 2023-09-08 NOTE — ED INITIAL ASSESSMENT (HPI)
Presents with 3 days of fever, headache, body aches, cough, and congestion. Some nausea, now resolved. Also c/o chills and sweats but did not take temp.

## 2023-09-23 ENCOUNTER — APPOINTMENT (OUTPATIENT)
Dept: GENERAL RADIOLOGY | Facility: HOSPITAL | Age: 38
End: 2023-09-23
Payer: COMMERCIAL

## 2023-09-23 ENCOUNTER — APPOINTMENT (OUTPATIENT)
Dept: CT IMAGING | Facility: HOSPITAL | Age: 38
End: 2023-09-23
Attending: NURSE PRACTITIONER
Payer: COMMERCIAL

## 2023-09-23 ENCOUNTER — HOSPITAL ENCOUNTER (EMERGENCY)
Facility: HOSPITAL | Age: 38
Discharge: HOME | End: 2023-09-23
Payer: COMMERCIAL

## 2023-09-23 ENCOUNTER — HOSPITAL ENCOUNTER (OUTPATIENT)
Age: 38
Discharge: ANOTHER HOSPITAL | End: 2023-09-23
Payer: COMMERCIAL

## 2023-09-23 VITALS
DIASTOLIC BLOOD PRESSURE: 86 MMHG | OXYGEN SATURATION: 94 % | TEMPERATURE: 99 F | HEART RATE: 96 BPM | SYSTOLIC BLOOD PRESSURE: 142 MMHG | RESPIRATION RATE: 24 BRPM

## 2023-09-23 VITALS
SYSTOLIC BLOOD PRESSURE: 110 MMHG | OXYGEN SATURATION: 93 % | RESPIRATION RATE: 19 BRPM | TEMPERATURE: 97 F | HEIGHT: 71 IN | DIASTOLIC BLOOD PRESSURE: 65 MMHG | BODY MASS INDEX: 44.1 KG/M2 | WEIGHT: 315 LBS | HEART RATE: 68 BPM

## 2023-09-23 DIAGNOSIS — R07.9 ACUTE CHEST PAIN: Primary | ICD-10-CM

## 2023-09-23 DIAGNOSIS — R07.89 CHEST PAIN, MUSCULOSKELETAL: ICD-10-CM

## 2023-09-23 DIAGNOSIS — L02.213 CHEST WALL ABSCESS: Primary | ICD-10-CM

## 2023-09-23 LAB
ALBUMIN SERPL-MCNC: 3.5 G/DL (ref 3.4–5)
ALBUMIN/GLOB SERPL: 0.8 {RATIO} (ref 1–2)
ALP LIVER SERPL-CCNC: 104 U/L
ALT SERPL-CCNC: 32 U/L
ANION GAP SERPL CALC-SCNC: 5 MMOL/L (ref 0–18)
AST SERPL-CCNC: 19 U/L (ref 15–37)
BASOPHILS # BLD AUTO: 0.11 X10(3) UL (ref 0–0.2)
BASOPHILS NFR BLD AUTO: 0.8 %
BILIRUB SERPL-MCNC: 0.5 MG/DL (ref 0.1–2)
BUN BLD-MCNC: 12 MG/DL (ref 7–18)
BUN/CREAT SERPL: 11.2 (ref 10–20)
CALCIUM BLD-MCNC: 8.9 MG/DL (ref 8.5–10.1)
CHLORIDE SERPL-SCNC: 109 MMOL/L (ref 98–112)
CO2 SERPL-SCNC: 26 MMOL/L (ref 21–32)
CREAT BLD-MCNC: 1.07 MG/DL
DEPRECATED RDW RBC AUTO: 44.9 FL (ref 35.1–46.3)
EGFRCR SERPLBLD CKD-EPI 2021: 91 ML/MIN/1.73M2 (ref 60–?)
EOSINOPHIL # BLD AUTO: 0.42 X10(3) UL (ref 0–0.7)
EOSINOPHIL NFR BLD AUTO: 3.2 %
ERYTHROCYTE [DISTWIDTH] IN BLOOD BY AUTOMATED COUNT: 13.9 % (ref 11–15)
GLOBULIN PLAS-MCNC: 4.5 G/DL (ref 2.8–4.4)
GLUCOSE BLD-MCNC: 114 MG/DL (ref 70–99)
HCT VFR BLD AUTO: 44.2 %
HGB BLD-MCNC: 14.2 G/DL
IMM GRANULOCYTES # BLD AUTO: 0.05 X10(3) UL (ref 0–1)
IMM GRANULOCYTES NFR BLD: 0.4 %
LYMPHOCYTES # BLD AUTO: 3.08 X10(3) UL (ref 1–4)
LYMPHOCYTES NFR BLD AUTO: 23.2 %
MCH RBC QN AUTO: 28.2 PG (ref 26–34)
MCHC RBC AUTO-ENTMCNC: 32.1 G/DL (ref 31–37)
MCV RBC AUTO: 87.9 FL
MONOCYTES # BLD AUTO: 0.79 X10(3) UL (ref 0.1–1)
MONOCYTES NFR BLD AUTO: 6 %
NEUTROPHILS # BLD AUTO: 8.8 X10 (3) UL (ref 1.5–7.7)
NEUTROPHILS # BLD AUTO: 8.8 X10(3) UL (ref 1.5–7.7)
NEUTROPHILS NFR BLD AUTO: 66.4 %
OSMOLALITY SERPL CALC.SUM OF ELEC: 291 MOSM/KG (ref 275–295)
PLATELET # BLD AUTO: 328 10(3)UL (ref 150–450)
POTASSIUM SERPL-SCNC: 3.7 MMOL/L (ref 3.5–5.1)
PROT SERPL-MCNC: 8 G/DL (ref 6.4–8.2)
RBC # BLD AUTO: 5.03 X10(6)UL
SODIUM SERPL-SCNC: 140 MMOL/L (ref 136–145)
TROPONIN I HIGH SENSITIVITY: 8 NG/L
WBC # BLD AUTO: 13.3 X10(3) UL (ref 4–11)

## 2023-09-23 PROCEDURE — 87205 SMEAR GRAM STAIN: CPT | Performed by: EMERGENCY MEDICINE

## 2023-09-23 PROCEDURE — 10060 I&D ABSCESS SIMPLE/SINGLE: CPT

## 2023-09-23 PROCEDURE — 36415 COLL VENOUS BLD VENIPUNCTURE: CPT

## 2023-09-23 PROCEDURE — 93005 ELECTROCARDIOGRAM TRACING: CPT

## 2023-09-23 PROCEDURE — 71045 X-RAY EXAM CHEST 1 VIEW: CPT

## 2023-09-23 PROCEDURE — 85025 COMPLETE CBC W/AUTO DIFF WBC: CPT

## 2023-09-23 PROCEDURE — 99214 OFFICE O/P EST MOD 30 MIN: CPT

## 2023-09-23 PROCEDURE — 84484 ASSAY OF TROPONIN QUANT: CPT

## 2023-09-23 PROCEDURE — 93010 ELECTROCARDIOGRAM REPORT: CPT

## 2023-09-23 PROCEDURE — 87070 CULTURE OTHR SPECIMN AEROBIC: CPT | Performed by: EMERGENCY MEDICINE

## 2023-09-23 PROCEDURE — 99284 EMERGENCY DEPT VISIT MOD MDM: CPT

## 2023-09-23 PROCEDURE — 99285 EMERGENCY DEPT VISIT HI MDM: CPT

## 2023-09-23 PROCEDURE — 80053 COMPREHEN METABOLIC PANEL: CPT

## 2023-09-23 PROCEDURE — 10061 I&D ABSCESS COMP/MULTIPLE: CPT

## 2023-09-23 PROCEDURE — 71260 CT THORAX DX C+: CPT | Performed by: NURSE PRACTITIONER

## 2023-09-23 PROCEDURE — 87077 CULTURE AEROBIC IDENTIFY: CPT | Performed by: EMERGENCY MEDICINE

## 2023-09-23 RX ORDER — LIDOCAINE HCL/EPINEPHRINE/PF 2%-1:200K
20 VIAL (ML) INJECTION ONCE
Status: DISCONTINUED | OUTPATIENT
Start: 2023-09-23 | End: 2023-09-23

## 2023-09-23 RX ORDER — LIDOCAINE HYDROCHLORIDE 10 MG/ML
INJECTION, SOLUTION EPIDURAL; INFILTRATION; INTRACAUDAL; PERINEURAL
Status: COMPLETED
Start: 2023-09-23 | End: 2023-09-23

## 2023-09-23 RX ORDER — SULFAMETHOXAZOLE AND TRIMETHOPRIM 800; 160 MG/1; MG/1
1 TABLET ORAL 2 TIMES DAILY
Qty: 14 TABLET | Refills: 0 | Status: SHIPPED | OUTPATIENT
Start: 2023-09-23 | End: 2023-09-30

## 2023-09-23 RX ORDER — LIDOCAINE HYDROCHLORIDE 10 MG/ML
20 INJECTION, SOLUTION EPIDURAL; INFILTRATION; INTRACAUDAL; PERINEURAL ONCE
Status: COMPLETED | OUTPATIENT
Start: 2023-09-23 | End: 2023-09-23

## 2023-09-23 NOTE — ED QUICK NOTES
Pt to ED by self with c/o intermittent mid sternal chest pain for one month. Pt states pain with inspiration. Pt states worsening  pain over the last day. Denies fall or trauma. Denies cough or fever. Bilateral lungs diminished at bases. No respiratory distress noted. Pt denies recent travel. Pt denies swelling to bilateral lower extremities. Pt denies nausea or vomiting. Pt skin parameters WNL. Pt is alert and oriented x4. Pt ambulating by self with steady gait. IV established to [address] [place] [address] #20-. Cardiac monitor and continuous pulse oximetry. SR noted on monitor. Awaiting labs and imaging. Will continue to monitor.

## 2023-09-23 NOTE — ED INITIAL ASSESSMENT (HPI)
Presents with 2 days of mid to left anterior chest pain. Pain has become more intense and constant since yesterday with SOB and intermittent diaphoresis. No cough.

## 2023-09-24 LAB
ATRIAL RATE: 88 BPM
ATRIAL RATE: 90 BPM
P AXIS: 35 DEGREES
P AXIS: 38 DEGREES
P-R INTERVAL: 156 MS
P-R INTERVAL: 178 MS
Q-T INTERVAL: 358 MS
Q-T INTERVAL: 364 MS
QRS DURATION: 120 MS
QRS DURATION: 122 MS
QTC CALCULATION (BEZET): 437 MS
QTC CALCULATION (BEZET): 440 MS
R AXIS: -52 DEGREES
R AXIS: -74 DEGREES
T AXIS: 40 DEGREES
T AXIS: 45 DEGREES
VENTRICULAR RATE: 88 BPM
VENTRICULAR RATE: 90 BPM

## 2023-09-24 NOTE — ED PROVIDER NOTES
Procedure:    Abscess drainage: The patient abscess was located anterior chest wall. I obtained verbal consent from the patient to drain the abscess who was informed about the possibility of bleeding, pain and worsening of the condition. The abscess was incised with a scalpel and a moderate amount of purulent drainage was expressed. I irrigated the wound and placed some packing. The patient tolerated the procedure well. The procedure was performed by myself. Subjective:     Osvaldo Denise  is a 54 y.o. male who presents for follow-up about 2 weeks following laparoscopic gastric bypass surgery . He has lost a total of 27 pounds since surgery. Body mass index is 42.54 kg/(m^2) (pended). .    Surgery related complication: mild post-op ooze. Stayed extra day due to nausea - no further issues. Liver bx report reviewed with pt. He is tolerating liquids without difficulty,reports ketotic symptoms and denies vomiting and abdominal pain. Fluid intake:good  Protein intake:good  Taking prescribed multivitamin. The patient's activity level: moderately active. Back to work. Changes in his medical history and medications have been reviewed.     Patient Active Problem List   Diagnosis Code    GERD (gastroesophageal reflux disease) K21.9    Sleep apnea G47.30    Morbid obesity ([address] E66.01    Morbid obesity with body mass index of 40.0-49.9 ([facility]) E66.01    Anxiety F41.9    Low testosterone E29.1    Smoking history Z87.891    Polycythemia ([address] D75.1    Morbid obesity with BMI of 45.0-49.9, adult ([address] E66.01, Z68.42    S/P bariatric surgery Z98.84    Intestinal malabsorption K90.9     Past Medical History:   Diagnosis Date    Anxiety     Fatty liver     GERD (gastroesophageal reflux disease)     Low testosterone     Morbid obesity ([address]     Morbid obesity with body mass index of 40.0-49.9 ([address]     Polycythemia ([address]     pt is on testosterone replacement    Psychiatric disorder     Sleep apnea     prescribed c-pap / unable to use it    Smoking history     quit 2011     Past Surgical History:   Procedure Laterality Date    HX LAP CHOLECYSTECTOMY      HX ORTHOPAEDIC      L foot surgery    HX ORTHOPAEDIC Left     wrist- left    HX ORTHOPAEDIC Left     ulnar nerve    HX OTHER SURGICAL      wisdom teeth     Current Outpatient Prescriptions   Medication Sig Dispense Refill    multivitamin with iron (FLINTSTONES) chewable tablet Take 1 Tab by mouth daily.  testosterone (TESTOPEL) 75 mg pllt by Implant route.  escitalopram oxalate (LEXAPRO) 10 mg tablet Take 10 mg by mouth daily. Indications: ANXIETY WITH DEPRESSION      omeprazole (PRILOSEC) 20 mg capsule Take 20 mg by mouth daily. Indications: GASTROESOPHAGEAL REFLUX         Objective:     Visit Vitals    BP (P) 139/84 (BP 1 Location: Left arm, BP Patient Position: Sitting)    Pulse (!) (P) 103    Ht (P) 5' 11\" (1.803 m)    Wt (P) 138.3 kg (305 lb)    SpO2 (P) 99%    BMI (P) 42.54 kg/m2        Physical Exam:    General:  alert, cooperative, no distress, appears stated age   Heart:  Regular rate and rhythm. .                     Lungs:   Lungs CTA   Abdomen:   abdomen is soft without significant tenderness, masses, organomegaly or guarding; Active bowel sounds all 4 quadrants. No hernia present. Incisions: healing well       Assessment:     1. History of Morbid obesity, status post  laparoscopic gastric bypass surgery. Doing well. Plan:     1. To continue focus on hydration. 2. May advance diet to soft phase. Reminded to measure portions, continue high protein, low carbohydrate diet. Reminded to eat regularly, to eat slowly & not to drink with meals. Discussed with pt. Pt verbalized understanding. 3.   Reminded about importance of taking vitamin supplements. 4.  To start cardio exercise. 5.  To continue current rx. To continue follow-up with PCP. 6.  Encouraged attendance @ support group  7. I have discussed this plan and education material with patient. Pt verbalized understanding. 8.   To follow-up in 2 week(s). To call if questions/concerns prior to office visit. Mr. Perico Calzada has a reminder for a \"due or due soon\" health maintenance. I have asked that he contact his primary care provider for follow-up on this health maintenance.

## 2023-09-24 NOTE — ED QUICK NOTES
Actions and side effects of Bactrim reviewed. Home wound care reviewed. Dressing in place at time of discharge. Importance of MD follow up reviewed.

## 2023-09-24 NOTE — DISCHARGE INSTRUCTIONS
The packing should be removed in about 2 to 3 days  Keep the area clean and dry clean twice a day with mild soap and water  Take the antibiotics as prescribed  Please follow-up with your doctor or one of our immediate cares to have the packing removed and have the area reassessed in about 3 days

## 2023-11-06 ENCOUNTER — HOSPITAL ENCOUNTER (OUTPATIENT)
Age: 38
Discharge: ANOTHER HOSPITAL | End: 2023-11-06
Payer: COMMERCIAL

## 2023-11-06 ENCOUNTER — HOSPITAL ENCOUNTER (EMERGENCY)
Facility: HOSPITAL | Age: 38
Discharge: HOME | End: 2023-11-07
Attending: EMERGENCY MEDICINE
Payer: COMMERCIAL

## 2023-11-06 ENCOUNTER — HOSPITAL ENCOUNTER (OUTPATIENT)
Age: 38
Discharge: OTHER INSTITUTION | End: 2023-11-06
Payer: COMMERCIAL

## 2023-11-06 VITALS
OXYGEN SATURATION: 98 % | TEMPERATURE: 98 F | RESPIRATION RATE: 18 BRPM | HEART RATE: 78 BPM | SYSTOLIC BLOOD PRESSURE: 154 MMHG | DIASTOLIC BLOOD PRESSURE: 94 MMHG

## 2023-11-06 DIAGNOSIS — G43.009 MIGRAINE WITHOUT AURA AND WITHOUT STATUS MIGRAINOSUS, NOT INTRACTABLE: Primary | ICD-10-CM

## 2023-11-06 DIAGNOSIS — R51.9 SEVERE HEADACHE: Primary | ICD-10-CM

## 2023-11-06 LAB
BASOPHILS # BLD AUTO: 0.12 X10(3) UL (ref 0–0.2)
BASOPHILS NFR BLD AUTO: 1.1 %
DEPRECATED RDW RBC AUTO: 44.7 FL (ref 35.1–46.3)
EOSINOPHIL # BLD AUTO: 0.54 X10(3) UL (ref 0–0.7)
EOSINOPHIL NFR BLD AUTO: 4.8 %
ERYTHROCYTE [DISTWIDTH] IN BLOOD BY AUTOMATED COUNT: 14.1 % (ref 11–15)
HCT VFR BLD AUTO: 46.9 %
HGB BLD-MCNC: 15.3 G/DL
IMM GRANULOCYTES # BLD AUTO: 0.04 X10(3) UL (ref 0–1)
IMM GRANULOCYTES NFR BLD: 0.4 %
LYMPHOCYTES # BLD AUTO: 3.14 X10(3) UL (ref 1–4)
LYMPHOCYTES NFR BLD AUTO: 28.1 %
MCH RBC QN AUTO: 28.2 PG (ref 26–34)
MCHC RBC AUTO-ENTMCNC: 32.6 G/DL (ref 31–37)
MCV RBC AUTO: 86.5 FL
MONOCYTES # BLD AUTO: 0.68 X10(3) UL (ref 0.1–1)
MONOCYTES NFR BLD AUTO: 6.1 %
NEUTROPHILS # BLD AUTO: 6.64 X10 (3) UL (ref 1.5–7.7)
NEUTROPHILS # BLD AUTO: 6.64 X10(3) UL (ref 1.5–7.7)
NEUTROPHILS NFR BLD AUTO: 59.5 %
PLATELET # BLD AUTO: 401 10(3)UL (ref 150–450)
RBC # BLD AUTO: 5.42 X10(6)UL
SARS-COV-2 RNA RESP QL NAA+PROBE: NOT DETECTED
WBC # BLD AUTO: 11.2 X10(3) UL (ref 4–11)

## 2023-11-06 PROCEDURE — 96375 TX/PRO/DX INJ NEW DRUG ADDON: CPT

## 2023-11-06 PROCEDURE — 85025 COMPLETE CBC W/AUTO DIFF WBC: CPT

## 2023-11-06 PROCEDURE — 80048 BASIC METABOLIC PNL TOTAL CA: CPT

## 2023-11-06 PROCEDURE — 99284 EMERGENCY DEPT VISIT MOD MDM: CPT

## 2023-11-06 PROCEDURE — 96374 THER/PROPH/DIAG INJ IV PUSH: CPT

## 2023-11-06 PROCEDURE — 99213 OFFICE O/P EST LOW 20 MIN: CPT

## 2023-11-06 PROCEDURE — 80048 BASIC METABOLIC PNL TOTAL CA: CPT | Performed by: EMERGENCY MEDICINE

## 2023-11-06 PROCEDURE — 85025 COMPLETE CBC W/AUTO DIFF WBC: CPT | Performed by: EMERGENCY MEDICINE

## 2023-11-06 RX ORDER — DIPHENHYDRAMINE HYDROCHLORIDE 50 MG/ML
25 INJECTION INTRAMUSCULAR; INTRAVENOUS ONCE
Status: COMPLETED | OUTPATIENT
Start: 2023-11-06 | End: 2023-11-06

## 2023-11-06 RX ORDER — DEXAMETHASONE SODIUM PHOSPHATE 4 MG/ML
10 VIAL (ML) INJECTION ONCE
Status: COMPLETED | OUTPATIENT
Start: 2023-11-06 | End: 2023-11-06

## 2023-11-06 RX ORDER — KETOROLAC TROMETHAMINE 15 MG/ML
15 INJECTION, SOLUTION INTRAMUSCULAR; INTRAVENOUS ONCE
Status: COMPLETED | OUTPATIENT
Start: 2023-11-06 | End: 2023-11-06

## 2023-11-07 ENCOUNTER — APPOINTMENT (OUTPATIENT)
Dept: CT IMAGING | Facility: HOSPITAL | Age: 38
End: 2023-11-07
Attending: EMERGENCY MEDICINE
Payer: COMMERCIAL

## 2023-11-07 VITALS
HEART RATE: 75 BPM | RESPIRATION RATE: 20 BRPM | BODY MASS INDEX: 44.1 KG/M2 | DIASTOLIC BLOOD PRESSURE: 96 MMHG | OXYGEN SATURATION: 94 % | HEIGHT: 71 IN | TEMPERATURE: 98 F | WEIGHT: 315 LBS | SYSTOLIC BLOOD PRESSURE: 155 MMHG

## 2023-11-07 LAB
ANION GAP SERPL CALC-SCNC: 5 MMOL/L (ref 0–18)
BUN BLD-MCNC: 9 MG/DL (ref 9–23)
BUN/CREAT SERPL: 9.8 (ref 10–20)
CALCIUM BLD-MCNC: 9.2 MG/DL (ref 8.7–10.4)
CHLORIDE SERPL-SCNC: 109 MMOL/L (ref 98–112)
CO2 SERPL-SCNC: 27 MMOL/L (ref 21–32)
CREAT BLD-MCNC: 0.92 MG/DL
EGFRCR SERPLBLD CKD-EPI 2021: 109 ML/MIN/1.73M2 (ref 60–?)
GLUCOSE BLD-MCNC: 89 MG/DL (ref 70–99)
OSMOLALITY SERPL CALC.SUM OF ELEC: 290 MOSM/KG (ref 275–295)
POTASSIUM SERPL-SCNC: 4.1 MMOL/L (ref 3.5–5.1)
SODIUM SERPL-SCNC: 141 MMOL/L (ref 136–145)

## 2023-11-07 PROCEDURE — 70450 CT HEAD/BRAIN W/O DYE: CPT | Performed by: EMERGENCY MEDICINE

## 2023-11-07 RX ORDER — LORAZEPAM 2 MG/ML
1 INJECTION INTRAMUSCULAR ONCE
Status: DISCONTINUED | OUTPATIENT
Start: 2023-11-07 | End: 2023-11-07

## 2023-11-07 NOTE — DISCHARGE INSTRUCTIONS
Ibuprofen 600 mg every 6 hours as needed or excedrin migraine    \"You had elevated blood pressure today and you need to follow up with your doctor for a repeat blood pressure check and further discussion of lifestyle modifications that include Weight Reduction - Dietary Sodium Restriction - Increased Physical Activity and Moderation in alcohol (ETOH) Consumption. If possible check your pressure at home and keep a blood pressure log to bring to your physician. \"    Return for intractable or worsening headaches

## 2023-11-07 NOTE — ED INITIAL ASSESSMENT (HPI)
Patient arrived ambulatory to room c/o intermittent headaches x5 days. Patient states when the pain comes it is mainly in the \"forehead and temples\" +blurred vision with the pain. +photophobia. +dizziness. Easy non labored respirations. No distress.

## 2023-11-07 NOTE — ED INITIAL ASSESSMENT (HPI)
Headaches on and off x5 days, pt c/o light sensitivity. Denies lightheaded/dizziness. No OTC meds taken today. denies recent illness.

## 2023-12-20 ENCOUNTER — HOSPITAL ENCOUNTER (OUTPATIENT)
Age: 38
Discharge: HOME | End: 2023-12-20
Attending: EMERGENCY MEDICINE
Payer: COMMERCIAL

## 2023-12-20 VITALS
OXYGEN SATURATION: 96 % | DIASTOLIC BLOOD PRESSURE: 92 MMHG | TEMPERATURE: 98 F | SYSTOLIC BLOOD PRESSURE: 143 MMHG | HEART RATE: 84 BPM | RESPIRATION RATE: 18 BRPM

## 2023-12-20 DIAGNOSIS — J06.9 UPPER RESPIRATORY TRACT INFECTION, UNSPECIFIED TYPE: Primary | ICD-10-CM

## 2023-12-20 LAB — SARS-COV-2 RNA RESP QL NAA+PROBE: NOT DETECTED

## 2023-12-20 PROCEDURE — 99212 OFFICE O/P EST SF 10 MIN: CPT

## 2023-12-20 NOTE — ED INITIAL ASSESSMENT (HPI)
Patient arrives ambulatory with c/o headache x couple days. 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 Worried he might have covid.

## 2024-01-18 ENCOUNTER — HOSPITAL ENCOUNTER (EMERGENCY)
Facility: HOSPITAL | Age: 39
Discharge: HOME | End: 2024-01-18
Attending: EMERGENCY MEDICINE

## 2024-01-18 VITALS
DIASTOLIC BLOOD PRESSURE: 90 MMHG | OXYGEN SATURATION: 96 % | HEART RATE: 86 BPM | RESPIRATION RATE: 18 BRPM | TEMPERATURE: 98 F | SYSTOLIC BLOOD PRESSURE: 141 MMHG

## 2024-01-18 DIAGNOSIS — U07.1 COVID-19 VIRUS INFECTION: Primary | ICD-10-CM

## 2024-01-18 LAB
FLUAV + FLUBV RNA SPEC NAA+PROBE: NEGATIVE
FLUAV + FLUBV RNA SPEC NAA+PROBE: NEGATIVE
RSV RNA SPEC NAA+PROBE: NEGATIVE
SARS-COV-2 RNA RESP QL NAA+PROBE: DETECTED

## 2024-01-18 PROCEDURE — 99283 EMERGENCY DEPT VISIT LOW MDM: CPT

## 2024-01-18 PROCEDURE — 0241U SARS-COV-2/FLU A AND B/RSV BY PCR (GENEXPERT): CPT | Performed by: EMERGENCY MEDICINE

## 2024-01-18 NOTE — ED QUICK NOTES
Patient provided with discharge instructions. Verbalized understanding for plan of care at home and follow up. All question and concerns addressed prior to discharge.

## 2024-01-18 NOTE — ED INITIAL ASSESSMENT (HPI)
Patient arrives ambulatory through triage with c/o of bodyaches and chills with diarrhea since this last Sunday.

## 2024-01-18 NOTE — ED PROVIDER NOTES
Patient Seen in: [facility] Emergency Department      History     Chief Complaint   Patient presents with    Body ache and/or chills     Stated Complaint: flu like symptoms    Subjective:   HPI    Patient is a 38-year-old male that complains of 4 days of fever cough congestion myalgias chills he has had some loose stools.  No nausea no vomiting no shortness of breath no abdominal pain.    Objective:   Past Medical History:   Diagnosis Date    Hyperlipidemia     Visual impairment     glasses              Past Surgical History:   Procedure Laterality Date    OTHER SURGICAL HISTORY  12/19/2019    excision chest wall cyst X 2                 Social History     Socioeconomic History    Marital status:    Tobacco Use    Smoking status: Former    Smokeless tobacco: Never   Vaping Use    Vaping Use: Never used   Substance and Sexual Activity    Alcohol use: Not Currently    Drug use: Never              Review of Systems    Positive for stated complaint: flu like symptoms  Other systems are as noted in HPI.  Constitutional and vital signs reviewed.      All other systems reviewed and negative except as noted above.    Physical Exam     ED Triage Vitals   BP 01/18/24 0208 (!) 143/92   Pulse 01/18/24 0208 90   Resp 01/18/24 0208 20   Temp 01/18/24 0208 97.9 °F (36.6 °C)   Temp src 01/18/24 0208 Oral   SpO2 01/18/24 0208 95 %   O2 Device 01/18/24 0229 None (Room air)       Current:/77   Pulse 84   Temp 97.9 °F (36.6 °C) (Oral)   Resp 20   SpO2 96%         Physical Exam    Constitutional: Oriented to person, place, and time. Appears well-developed and well-nourished.   HEENT:   Head: Normocephalic and atraumatic.  2 large keloids right jawline  Right Ear: External ear normal.   Left Ear: External ear normal.   Nose: Nose normal.   Mouth/Throat: Oropharynx is clear and moist.   Eyes: Conjunctivae and EOM are normal. Pupils are equal, round, and reactive to light.   Neck: Neck supple.   Cardiovascular:  Normal rate, regular rhythm, normal heart sounds and intact distal pulses.    Pulmonary/Chest: Effort normal and breath sounds normal. No respiratory distress.   Abdominal: Soft. Bowel sounds are normal. Exhibits no distension and no mass. There is no tenderness. There is no rebound and no guarding.   Musculoskeletal: Normal range of motion. Exhibits no edema or tenderness.   Lymphadenopathy: No cervical adenopathy.   Neurological: Alert and oriented to person, place, and time. Normal reflexes. No cranial nerve deficit. No motor os sensory defecits noted Coordination normal.   Skin: Skin is warm and dry.   Psychiatric: Normal mood and affect. Behavior is normal. Judgment and thought content normal.   Nursing note and vitals reviewed.        ED Course     Labs Reviewed   SARS-COV-2/FLU A AND B/RSV BY PCR (GENEXPERT) - Abnormal; Notable for the following components:       Result Value    SARS-CoV-2 (COVID-19) - (GeneXpert) Detected (*)     All other components within normal limits    Narrative:     This test is intended for the qualitative detection and differentiation of SARS-CoV-2, influenza A, influenza B, and respiratory syncytial virus (RSV) viral RNA in nasopharyngeal or nares swabs from individuals suspected of respiratory viral infection consistent with COVID-19 by their healthcare provider. Signs and symptoms of respiratory viral infection due to SARS-CoV-2, influenza, and RSV can be similar.    Test performed using the Xpert Xpress SARS-CoV-2/FLU/RSV (real time RT-PCR)  assay on the GeneXpert instrument, [facility], [place] [address].   This test is being used under the Food and Drug Administration's Emergency Use Authorization.    The authorized Fact Sheet for Healthcare Providers for this assay is available upon request from the laboratory.                      MDM      Use of independent historian:     I personally reviewed and interpreted the images :     No results found.    Vitals:    01/18/24 0208  01/18/24 0230   BP: (!) 143/92 135/77   Pulse: 90 84   Resp: 20    Temp: 97.9 °F (36.6 °C)    TempSrc: Oral    SpO2: 95% 96%     *I personally reviewed and interpreted all ED vitals.    Pulse Ox: 95%, Room air, Normal         Differential Diagnosis/ Diagnostic Considerations: Fever chills myalgia and URI symptoms consider pneumonia consider COVID consider influenza consider RSV    Medical Record Review: I personally reviewed available prior medical records for any recent pertinent discharge summaries, testing, and procedures and reviewed those reports and found immediate care visit 12/20/2023 for headache COVID test was negative at that time..    Complicating Factors: The patient already has  which contribute to the complexity of this ED evaluation.    Social determinants of health:    Prescription drug management:      Shared Decision Making:    ED Course: Workup findings shared with patient will discharge home    Discussion of management with other healthcare providers:    Condition upon leaving the department: Stable                                     Medical Decision Making      Disposition and Plan     Clinical Impression:  1. COVID-19 virus infection         Disposition:  Discharge  1/18/2024  3:11 am    Follow-up:  Adriana Peters MD  [address]  [place] [id number] [phone]    Follow up in 3 day(s)            Medications Prescribed:  Current Discharge Medication List

## 2024-03-13 ENCOUNTER — OFFICE VISIT (OUTPATIENT)
Dept: PODIATRY CLINIC | Facility: CLINIC | Age: 39
End: 2024-03-13

## 2024-03-13 DIAGNOSIS — L60.0 INGROWN LEFT GREATER TOENAIL: Primary | ICD-10-CM

## 2024-03-13 DIAGNOSIS — M79.675 GREAT TOE PAIN, LEFT: ICD-10-CM

## 2024-03-13 PROCEDURE — 99203 OFFICE O/P NEW LOW 30 MIN: CPT | Performed by: PODIATRIST

## 2024-03-13 NOTE — PROGRESS NOTES
[facility]  Progress Note    Robert Subramanian is a 38 year old male.   Chief Complaint   Patient presents with    Toe Pain     New pt- L hallux-onset-not sure when- denies injury- rates pain 6-10/10 on and off         HPI:     This is a pleasant male that presents to clinic today due to left great toe pain.  He is not sure if the toenail is ingrown.  He denies any injury or drainage.  He states it has been present for a few months.  He has not tried any treatments.         Allergies: Patient has no known allergies.   Current Outpatient Medications   Medication Sig Dispense Refill    Ciclopirox 1 % External Shampoo SHAMPOO WITH LIBERALLY AND LET IT SIT LATHERED UP ON THE SCALP FOR 5 MINUTES ONCE EVERY 2 WEEKS AS DIRECTED      Desoximetasone 0.25 % External Cream Apply topically 2 (two) times daily.      Econazole Nitrate 1 % External Cream Apply topically 2 (two) times daily.      Fluocinolone Acetonide 0.01 % Otic Oil APPLY 2 DROPS INTO THE AFFECTED EAR 2 TIMES A DAY AS NEEDED (Patient not taking: Reported on 7/19/2023)      Halobetasol Propionate 0.05 % External Ointment Apply topically 2 (two) times daily.      mupirocin 2 % External Ointment Apply 1 Application topically 2 (two) times daily.      sucralfate 1 g Oral Tab Take 1 tablet (1 g total) by mouth 4 (four) times daily before meals and nightly. (Patient not taking: Reported on 7/19/2023) 30 tablet 0    naproxen 500 MG Oral Tab Take 1 tablet (500 mg total) by mouth 2 (two) times daily with meals. 60 tablet 0    Multiple Vitamins-Minerals (ONE-A-DAY MENS HEALTH FORMULA) Oral Tab Take 1 tablet by mouth. (Patient not taking: Reported on 7/19/2023)        Past Medical History:   Diagnosis Date    Hyperlipidemia     Visual impairment     glasses      Past Surgical History:   Procedure Laterality Date    OTHER SURGICAL HISTORY  12/19/2019    excision chest wall cyst X 2       Family History   Problem Relation Age of Onset    Diabetes Father      Diabetes Mother       Social History     Socioeconomic History    Marital status:    Tobacco Use    Smoking status: Former    Smokeless tobacco: Never   Vaping Use    Vaping Use: Never used   Substance and Sexual Activity    Alcohol use: Not Currently    Drug use: Never           REVIEW OF SYSTEMS:   Denies nausea, fever, chills  No calf pain  No other muscle or joint aches  Denies chest pain or shortness of breath.      EXAM:   There were no vitals taken for this visit.    Constitutional:   Patient in no apparent distress. Well kept Of normal body habitus. Alert and oriented to person, place, and time.  Integumentary examination:   There are no varicosities.   Skin appears moist, warm, and supple with positive hair growth.     Left hallux lateral nail border is incurvated with no SOI    Vascular examination:   DP pulse is 2/4  PT pulse is 2/4  Capillary refill is immediate  Edema is not present bilateral.  Temperature warm proximally to warm distally bilateral.  Neurological examination:   Vibratory (128-Hz tuning fork) sensation is present to right and is present to left.  Sharp/dull is present to right and is present to left.  Musculoskeletal examination:  Muscle Strength is 5/5.    POP to left hallux lateral nail border       LABS & IMAGING:     Lab Results   Component Value Date    GLU 89 11/06/2023    BUN 9 11/06/2023    CREATSERUM 0.92 11/06/2023    BUNCREA 9.8 (L) 11/06/2023    ANIONGAP 5 11/06/2023    GFRAA 92 01/02/2022    GFRNAA 79 01/02/2022    CA 9.2 11/06/2023     11/06/2023    K 4.1 11/06/2023     11/06/2023    CO2 27.0 11/06/2023    OSMOCALC 290 11/06/2023        Lab Results   Component Value Date     (H) 06/28/2023    A1C 6.2 (H) 06/28/2023        No results found.     ASSESSMENT AND PLAN:   Diagnoses and all orders for this visit:    Ingrown left greater toenail    Great toe pain, left        Plan:     Treatment options reviewed with the patient related to  condition/diagnosis.  Discussed advantages and disadvantages as well as risks/potential complications related to nail surgery.    Pt would like to proceed with left hallux lateral nail border matrixectomy    RTC for left hallux lateral nail border matrixectomy    No follow-ups on file.    Justin Guerra DPM  3/13/2024

## 2024-03-26 ENCOUNTER — HOSPITAL ENCOUNTER (OUTPATIENT)
Facility: HOSPITAL | Age: 39
Setting detail: OBSERVATION
Discharge: HOME | End: 2024-03-28
Attending: EMERGENCY MEDICINE | Admitting: HOSPITALIST
Payer: COMMERCIAL

## 2024-03-26 ENCOUNTER — APPOINTMENT (OUTPATIENT)
Dept: GENERAL RADIOLOGY | Facility: HOSPITAL | Age: 39
End: 2024-03-26
Attending: EMERGENCY MEDICINE
Payer: COMMERCIAL

## 2024-03-26 ENCOUNTER — HOSPITAL ENCOUNTER (OUTPATIENT)
Age: 39
Discharge: ANOTHER HOSPITAL | End: 2024-03-26
Payer: COMMERCIAL

## 2024-03-26 ENCOUNTER — APPOINTMENT (OUTPATIENT)
Dept: GENERAL RADIOLOGY | Age: 39
End: 2024-03-26
Attending: PHYSICIAN ASSISTANT
Payer: COMMERCIAL

## 2024-03-26 VITALS
RESPIRATION RATE: 20 BRPM | OXYGEN SATURATION: 93 % | TEMPERATURE: 99 F | SYSTOLIC BLOOD PRESSURE: 151 MMHG | HEART RATE: 104 BPM | DIASTOLIC BLOOD PRESSURE: 93 MMHG

## 2024-03-26 DIAGNOSIS — J18.9 PNEUMONIA OF BOTH LOWER LOBES DUE TO INFECTIOUS ORGANISM: ICD-10-CM

## 2024-03-26 DIAGNOSIS — I49.8 BRUGADA SYNDROME: Primary | ICD-10-CM

## 2024-03-26 DIAGNOSIS — R07.9 CHEST PAIN OF UNCERTAIN ETIOLOGY: ICD-10-CM

## 2024-03-26 DIAGNOSIS — R94.31 ABNORMAL ECG: Primary | ICD-10-CM

## 2024-03-26 LAB
#MXD IC: 0.8 X10ˆ3/UL (ref 0.1–1)
ALBUMIN SERPL-MCNC: 4.1 G/DL (ref 3.2–4.8)
ALP LIVER SERPL-CCNC: 94 U/L
ALT SERPL-CCNC: 18 U/L
ANION GAP SERPL CALC-SCNC: 6 MMOL/L (ref 0–18)
AST SERPL-CCNC: 21 U/L (ref ?–34)
BASOPHILS # BLD AUTO: 0.1 X10(3) UL (ref 0–0.2)
BASOPHILS NFR BLD AUTO: 1.1 %
BILIRUB DIRECT SERPL-MCNC: 0.2 MG/DL (ref ?–0.3)
BILIRUB SERPL-MCNC: 0.4 MG/DL (ref 0.3–1.2)
BNP SERPL-MCNC: 8 PG/ML
BUN BLD-MCNC: 12 MG/DL (ref 9–23)
BUN BLD-MCNC: 13 MG/DL (ref 7–18)
BUN/CREAT SERPL: 10.9 (ref 10–20)
CALCIUM BLD-MCNC: 8.9 MG/DL (ref 8.7–10.4)
CHLORIDE BLD-SCNC: 101 MMOL/L (ref 98–112)
CHLORIDE SERPL-SCNC: 108 MMOL/L (ref 98–112)
CO2 BLD-SCNC: 24 MMOL/L (ref 21–32)
CO2 SERPL-SCNC: 24 MMOL/L (ref 21–32)
CREAT BLD-MCNC: 1.1 MG/DL
CREAT BLD-MCNC: 1.2 MG/DL
DEPRECATED RDW RBC AUTO: 43.1 FL (ref 35.1–46.3)
EGFRCR SERPLBLD CKD-EPI 2021: 79 ML/MIN/1.73M2 (ref 60–?)
EGFRCR SERPLBLD CKD-EPI 2021: 88 ML/MIN/1.73M2 (ref 60–?)
EOSINOPHIL # BLD AUTO: 0.21 X10(3) UL (ref 0–0.7)
EOSINOPHIL NFR BLD AUTO: 2.3 %
ERYTHROCYTE [DISTWIDTH] IN BLOOD BY AUTOMATED COUNT: 14 % (ref 11–15)
FLUAV + FLUBV RNA SPEC NAA+PROBE: NEGATIVE
FLUAV + FLUBV RNA SPEC NAA+PROBE: NEGATIVE
GLUCOSE BLD-MCNC: 93 MG/DL (ref 70–99)
GLUCOSE BLD-MCNC: 98 MG/DL (ref 70–99)
GLUCOSE BLDC GLUCOMTR-MCNC: 94 MG/DL (ref 70–99)
HCT VFR BLD AUTO: 41.7 %
HCT VFR BLD AUTO: 43.7 %
HCT VFR BLD CALC: 48 %
HGB BLD-MCNC: 13.9 G/DL
HGB BLD-MCNC: 14.2 G/DL
IMM GRANULOCYTES # BLD AUTO: 0.08 X10(3) UL (ref 0–1)
IMM GRANULOCYTES NFR BLD: 0.9 %
ISTAT IONIZED CALCIUM FOR CHEM 8: 1.16 MMOL/L (ref 1.12–1.32)
LYMPHOCYTES # BLD AUTO: 1.38 X10(3) UL (ref 1–4)
LYMPHOCYTES # BLD AUTO: 1.5 X10ˆ3/UL (ref 1–4)
LYMPHOCYTES NFR BLD AUTO: 15.3 %
LYMPHOCYTES NFR BLD AUTO: 15.9 %
MAGNESIUM SERPL-MCNC: 1.7 MG/DL (ref 1.6–2.6)
MCH RBC QN AUTO: 27.7 PG (ref 26–34)
MCH RBC QN AUTO: 28.4 PG (ref 26–34)
MCHC RBC AUTO-ENTMCNC: 32.5 G/DL (ref 31–37)
MCHC RBC AUTO-ENTMCNC: 33.3 G/DL (ref 31–37)
MCV RBC AUTO: 85.1 FL
MCV RBC AUTO: 85.2 FL (ref 80–100)
MIXED CELL %: 8.3 %
MONOCYTES # BLD AUTO: 0.86 X10(3) UL (ref 0.1–1)
MONOCYTES NFR BLD AUTO: 9.5 %
NEUTROPHILS # BLD AUTO: 6.4 X10 (3) UL (ref 1.5–7.7)
NEUTROPHILS # BLD AUTO: 6.4 X10(3) UL (ref 1.5–7.7)
NEUTROPHILS # BLD AUTO: 7 X10ˆ3/UL (ref 1.5–7.7)
NEUTROPHILS NFR BLD AUTO: 70.9 %
NEUTROPHILS NFR BLD AUTO: 75.8 %
OSMOLALITY SERPL CALC.SUM OF ELEC: 285 MOSM/KG (ref 275–295)
PLATELET # BLD AUTO: 295 X10ˆ3/UL (ref 150–450)
PLATELET # BLD AUTO: 296 10(3)UL (ref 150–450)
POCT INFLUENZA A: NEGATIVE
POCT INFLUENZA B: NEGATIVE
POTASSIUM BLD-SCNC: 3.6 MMOL/L (ref 3.6–5.1)
POTASSIUM SERPL-SCNC: 3.5 MMOL/L (ref 3.5–5.1)
POTASSIUM SERPL-SCNC: 4.2 MMOL/L (ref 3.5–5.1)
PROT SERPL-MCNC: 7.1 G/DL (ref 5.7–8.2)
RBC # BLD AUTO: 4.9 X10(6)UL
RBC # BLD AUTO: 5.13 X10ˆ6/UL
RSV RNA SPEC NAA+PROBE: NEGATIVE
SARS-COV-2 RNA RESP QL NAA+PROBE: NOT DETECTED
SARS-COV-2 RNA RESP QL NAA+PROBE: NOT DETECTED
SODIUM BLD-SCNC: 138 MMOL/L (ref 136–145)
SODIUM SERPL-SCNC: 138 MMOL/L (ref 136–145)
TROPONIN I BLD-MCNC: <0.02 NG/ML
TROPONIN I SERPL HS-MCNC: 11 NG/L
WBC # BLD AUTO: 9 X10(3) UL (ref 4–11)
WBC # BLD AUTO: 9.3 X10ˆ3/UL (ref 4–11)

## 2024-03-26 PROCEDURE — 87205 SMEAR GRAM STAIN: CPT | Performed by: PHYSICIAN ASSISTANT

## 2024-03-26 PROCEDURE — 85025 COMPLETE CBC W/AUTO DIFF WBC: CPT | Performed by: PHYSICIAN ASSISTANT

## 2024-03-26 PROCEDURE — 71046 X-RAY EXAM CHEST 2 VIEWS: CPT | Performed by: PHYSICIAN ASSISTANT

## 2024-03-26 PROCEDURE — 87077 CULTURE AEROBIC IDENTIFY: CPT | Performed by: PHYSICIAN ASSISTANT

## 2024-03-26 PROCEDURE — 94640 AIRWAY INHALATION TREATMENT: CPT

## 2024-03-26 PROCEDURE — 80047 BASIC METABLC PNL IONIZED CA: CPT

## 2024-03-26 PROCEDURE — 82962 GLUCOSE BLOOD TEST: CPT

## 2024-03-26 PROCEDURE — 99223 1ST HOSP IP/OBS HIGH 75: CPT | Performed by: INTERNAL MEDICINE

## 2024-03-26 PROCEDURE — 99214 OFFICE O/P EST MOD 30 MIN: CPT

## 2024-03-26 PROCEDURE — 93010 ELECTROCARDIOGRAM REPORT: CPT

## 2024-03-26 PROCEDURE — 87502 INFLUENZA DNA AMP PROBE: CPT | Performed by: PHYSICIAN ASSISTANT

## 2024-03-26 PROCEDURE — 93005 ELECTROCARDIOGRAM TRACING: CPT

## 2024-03-26 PROCEDURE — 84484 ASSAY OF TROPONIN QUANT: CPT

## 2024-03-26 PROCEDURE — 87070 CULTURE OTHR SPECIMN AEROBIC: CPT | Performed by: PHYSICIAN ASSISTANT

## 2024-03-26 PROCEDURE — 96360 HYDRATION IV INFUSION INIT: CPT

## 2024-03-26 RX ORDER — IBUPROFEN 600 MG/1
600 TABLET ORAL ONCE
Status: COMPLETED | OUTPATIENT
Start: 2024-03-26 | End: 2024-03-26

## 2024-03-26 RX ORDER — POTASSIUM CHLORIDE 20 MEQ/1
40 TABLET, EXTENDED RELEASE ORAL EVERY 4 HOURS
Status: COMPLETED | OUTPATIENT
Start: 2024-03-26 | End: 2024-03-26

## 2024-03-26 RX ORDER — ACETAMINOPHEN 500 MG
500 TABLET ORAL EVERY 4 HOURS PRN
Status: DISCONTINUED | OUTPATIENT
Start: 2024-03-26 | End: 2024-03-26

## 2024-03-26 RX ORDER — IPRATROPIUM BROMIDE AND ALBUTEROL SULFATE 2.5; .5 MG/3ML; MG/3ML
3 SOLUTION RESPIRATORY (INHALATION) ONCE
Status: COMPLETED | OUTPATIENT
Start: 2024-03-26 | End: 2024-03-26

## 2024-03-26 RX ORDER — IBUPROFEN 400 MG/1
400 TABLET ORAL EVERY 6 HOURS PRN
Status: DISCONTINUED | OUTPATIENT
Start: 2024-03-26 | End: 2024-03-28

## 2024-03-26 RX ORDER — SODIUM CHLORIDE 9 MG/ML
1000 INJECTION, SOLUTION INTRAVENOUS ONCE
Status: COMPLETED | OUTPATIENT
Start: 2024-03-26 | End: 2024-03-26

## 2024-03-26 RX ORDER — ACETAMINOPHEN 500 MG
1000 TABLET ORAL EVERY 8 HOURS PRN
Status: DISCONTINUED | OUTPATIENT
Start: 2024-03-26 | End: 2024-03-28

## 2024-03-26 RX ORDER — HYDROCODONE BITARTRATE AND ACETAMINOPHEN 5; 325 MG/1; MG/1
2 TABLET ORAL EVERY 4 HOURS PRN
Status: DISCONTINUED | OUTPATIENT
Start: 2024-03-26 | End: 2024-03-28

## 2024-03-26 RX ORDER — MAGNESIUM OXIDE 400 MG/1
400 TABLET ORAL ONCE
Status: COMPLETED | OUTPATIENT
Start: 2024-03-26 | End: 2024-03-26

## 2024-03-26 RX ORDER — ACETAMINOPHEN 325 MG/1
650 TABLET ORAL EVERY 4 HOURS PRN
Status: DISCONTINUED | OUTPATIENT
Start: 2024-03-26 | End: 2024-03-28

## 2024-03-26 RX ORDER — BENZONATATE 200 MG/1
200 CAPSULE ORAL 3 TIMES DAILY PRN
Status: DISCONTINUED | OUTPATIENT
Start: 2024-03-26 | End: 2024-03-28

## 2024-03-26 RX ORDER — HEPARIN SODIUM 5000 [USP'U]/ML
7500 INJECTION, SOLUTION INTRAVENOUS; SUBCUTANEOUS EVERY 8 HOURS SCHEDULED
Status: DISCONTINUED | OUTPATIENT
Start: 2024-03-26 | End: 2024-03-28

## 2024-03-26 RX ORDER — HYDROCODONE BITARTRATE AND ACETAMINOPHEN 5; 325 MG/1; MG/1
1 TABLET ORAL EVERY 4 HOURS PRN
Status: DISCONTINUED | OUTPATIENT
Start: 2024-03-26 | End: 2024-03-28

## 2024-03-26 RX ORDER — SODIUM CHLORIDE 9 MG/ML
INJECTION, SOLUTION INTRAVENOUS CONTINUOUS
Status: DISCONTINUED | OUTPATIENT
Start: 2024-03-26 | End: 2024-03-27

## 2024-03-26 RX ORDER — ACETAMINOPHEN 500 MG
1000 TABLET ORAL ONCE
Status: COMPLETED | OUTPATIENT
Start: 2024-03-26 | End: 2024-03-26

## 2024-03-26 RX ORDER — ONDANSETRON 2 MG/ML
4 INJECTION INTRAMUSCULAR; INTRAVENOUS EVERY 6 HOURS PRN
Status: DISCONTINUED | OUTPATIENT
Start: 2024-03-26 | End: 2024-03-28

## 2024-03-26 RX ORDER — POTASSIUM CHLORIDE 20 MEQ/1
40 TABLET, EXTENDED RELEASE ORAL ONCE
Status: COMPLETED | OUTPATIENT
Start: 2024-03-26 | End: 2024-03-26

## 2024-03-26 NOTE — ED PROVIDER NOTES
Patient Seen in: [facility] Emergency Department      History     Chief Complaint   Patient presents with    Chest Pain Angina     Stated Complaint: ekg changes    Subjective:   HPI  38-year-old male with obesity presenting for evaluation of chest discomfort and pressure, cough and fever.  Symptoms started last night.  His daughter is also ill at home.  No hemoptysis.  No swelling the legs.  No vomiting, diarrhea, dysuria or hematuria.  No headache or neck stiffness.  No history of VTE, recent travel, hospitalization or immobilization.  He was sent in from the immediate care for abnormal EKG.  He does report feeling slightly better after DuoNeb was given in immediate care.    Objective:   Past Medical History:   Diagnosis Date    Hyperlipidemia     Visual impairment     glasses              Past Surgical History:   Procedure Laterality Date    OTHER SURGICAL HISTORY  12/19/2019    excision chest wall cyst X 2                 Social History     Socioeconomic History    Marital status:    Tobacco Use    Smoking status: Former    Smokeless tobacco: Never   Vaping Use    Vaping Use: Never used   Substance and Sexual Activity    Alcohol use: Not Currently    Drug use: Never              Review of Systems    Positive for stated complaint: ekg changes  Other systems are as noted in HPI.  Constitutional and vital signs reviewed.      All other systems reviewed and negative except as noted above.    Physical Exam     ED Triage Vitals [03/26/24 1144]   BP (!) 143/91   Pulse 100   Resp 20   Temp 97.9 °F (36.6 °C)   Temp src Oral   SpO2 99 %   O2 Device None (Room air)       Current:BP (!) 155/98   Pulse 92   Temp 97.9 °F (36.6 °C) (Oral)   Resp 19   Ht 180.3 cm (5' 11\")   Wt (!) 165.6 kg   SpO2 96%   BMI 50.91 kg/m²         Physical Exam  Vitals and nursing note reviewed.   Constitutional:       Appearance: He is well-developed.   HENT:      Head: Normocephalic and atraumatic.   Eyes:      Extraocular  Movements: Extraocular movements intact.   Cardiovascular:      Rate and Rhythm: Normal rate and regular rhythm.      Heart sounds: Normal heart sounds.   Pulmonary:      Effort: Pulmonary effort is normal.      Breath sounds: Normal breath sounds.   Abdominal:      General: There is no distension.      Palpations: Abdomen is soft.      Tenderness: There is no abdominal tenderness.   Musculoskeletal:         General: Normal range of motion.      Cervical back: Normal range of motion.      Right lower leg: No edema.      Left lower leg: No edema.   Skin:     General: Skin is warm.   Neurological:      Mental Status: He is alert.      Comments: No focal deficits         Differential diagnose includes but is not limited to pneumonia, viral syndrome, ACS/MI, arrhythmia    ED Course     Labs Reviewed   BASIC METABOLIC PANEL (8) - Normal   HEPATIC FUNCTION PANEL (7) - Normal   TROPONIN I HIGH SENSITIVITY - Normal   BNP (B TYPE NATRIURETIC PEPTIDE) - Normal   MAGNESIUM - Normal   CBC WITH DIFFERENTIAL WITH PLATELET    Narrative:     The following orders were created for panel order CBC With Differential With Platelet.  Procedure                               Abnormality         Status                     ---------                               -----------         ------                     CBC W/ DIFFERENTIAL[[id number]]                              Final result                 Please view results for these tests on the individual orders.   SARS-COV-2/FLU A AND B/RSV BY PCR (GENEXPERT)   CBC W/ DIFFERENTIAL     EKG at 1135    Rate, intervals and axes as noted on EKG Report.  Rate: 100  Rhythm: Sinus Rhythm  Reading: Concerning for Brugada type I pattern, no STEMI            XR CHEST PA + LAT CHEST (CPT=71046)    Result Date: 3/26/2024  CONCLUSION:  Minimal basilar reticular opacities may be atelectatic, with or without superimposed acute infectious process.    Dictated by (CST): Fabrice Zamora MD on 3/26/2024 at 9:50 AM      Finalized by (CST): Fabrice Zamora MD on 3/26/2024 at 9:51 AM           ED Course as of 03/26/24 1337  ------------------------------------------------------------  Time: 03/26 1303  Comment: CBC unremarkable  ------------------------------------------------------------  Time: 03/26 1321  Comment: BMP and mag normal, LFTs normal  ------------------------------------------------------------  Time: 03/26 1321  Comment: High-sensitivity troponin and BNP normal  ------------------------------------------------------------  Time: 03/26 1321  Comment: I reviewed immediate care x-ray and on my independent interpretation of images, no clear pneumonia, opacities favored to represent atelectasis     XR CHEST PA + LAT CHEST (CPT=71046)    Result Date: 3/26/2024  CONCLUSION:  Minimal basilar reticular opacities may be atelectatic, with or without superimposed acute infectious process.    Dictated by (CST): Fabrice Zamora MD on 3/26/2024 at 9:50 AM     Finalized by (CST): Fabrice Zamora MD on 3/26/2024 at 9:51 AM                  MDM          I spent a total of 35 minutes of critical care time in obtaining history, performing a physical exam, bedside monitoring of interventions, collecting and interpreting tests and discussion with consultants but not including time spent performing procedures.                                 Medical Decision Making  Vital signs stable in the ED.  Labs and chest x-ray reviewed as above.  I spoke with cardiology Dr. Morataya who reviewed EKG and feels is it consistent with Brugada type I and recommends admission for further management.  Discussed with Dr. Hawkins for admission.      Problems Addressed:  Brugada syndrome: acute illness or injury with systemic symptoms that poses a threat to life or bodily functions    Amount and/or Complexity of Data Reviewed  External Data Reviewed: labs and notes.     Details: Reviewed immediate care notes, radiology, labs and EKG which prompted this ER  visit  Labs: ordered. Decision-making details documented in ED Course.  Radiology: independent interpretation performed.  ECG/medicine tests: ordered and independent interpretation performed. Decision-making details documented in ED Course.  Discussion of management or test interpretation with external provider(s): As above        Disposition and Plan     Clinical Impression:  1. Brugada syndrome         Disposition:  Admit  3/26/2024  1:31 pm    Follow-up:  No follow-up provider specified.        Medications Prescribed:  Current Discharge Medication List                            Hospital Problems       Present on Admission  Date Reviewed: 3/13/2024            ICD-10-CM Noted POA    * (Principal) Brugada syndrome I49.8 3/26/2024 Unknown

## 2024-03-26 NOTE — ED INITIAL ASSESSMENT (HPI)
Pt reports CP, body aches, cough and fever that started yesterday. Pt was at  and was sent because of EKG change, fever and High blood pressure.   Pt reports 7/10 CP.

## 2024-03-26 NOTE — PLAN OF CARE
Patient alert and oriented x 4. Vitals stable on room air. Patient's HR increases with ambulation. Patient denied pain. Patient to receive Echo and continue to monitor heart rhythm. Plan to discharge home once medically cleared. Call light within reach.    Problem: Patient Centered Care  Goal: Patient preferences are identified and integrated in the patient's plan of care  Description: Interventions:  - What would you like us to know as we care for you? From home with family  - Provide timely, complete, and accurate information to patient/family  - Incorporate patient and family knowledge, values, beliefs, and cultural backgrounds into the planning and delivery of care  - Encourage patient/family to participate in care and decision-making at the level they choose  - Honor patient and family perspectives and choices  Outcome: Progressing     Problem: Patient/Family Goals  Goal: Patient/Family Long Term Goal  Description: Patient's Long Term Goal: Be able to discharge    Interventions:  - Monitor heart rhythm  -monitor labs  - See additional Care Plan goals for specific interventions  Outcome: Progressing  Goal: Patient/Family Short Term Goal  Description: Patient's Short Term Goal: Resolve heart rhythm    Interventions:   - Medication administration  - Monitor labs  - See additional Care Plan goals for specific interventions  Outcome: Progressing

## 2024-03-26 NOTE — H&P
[facility]  part of [facility]  HISTORY AND PHYSICAL       Robert Rhett Subramanian Patient Status:  Emergency    1985  38 year old CSN [id number]   Location 56/56 Attending Kari Mandujano MD     PCP Adriana Tan MD         DATE OF ADMISSION: 3/26/2024     CHIEF COMPLAINT: Fever    HISTORY OF PRESENT ILLNESS  This is a 38 year oldmale who presented complaining of fever.  Patient stated symptoms started on the evening prior to admission.  Patient noted associated cough, shortness of breath, fatigue and intermittent chest pain.  Symptoms failed to improve prompting visit to ED.  Patient does report his daughter has been sick at home with upper respiratory symptoms.  Patient states he was in his normal state of health prior to yesterday.  Patient reports cough has been dry, nonproductive.  Patient otherwise denied abdominal pain, nausea or vomiting.    In ED, patient had an EKG completed which was noted to be abnormal with signs concerning for possible Brugada type I pattern.  Patient was recommended for admission for further monitoring    PAST MEDICAL HISTORY  Past Medical History:   Diagnosis Date    Hyperlipidemia     Visual impairment     glasses        PAST SURGICAL HISTORY  Past Surgical History:   Procedure Laterality Date    OTHER SURGICAL HISTORY  2019    excision chest wall cyst X 2        ALLERGIES   Patient has no known allergies.    MEDICATIONS  Patient's Medications   New Prescriptions    No medications on file   Previous Medications    CICLOPIROX 1 % EXTERNAL SHAMPOO    SHAMPOO WITH LIBERALLY AND LET IT SIT LATHERED UP ON THE SCALP FOR 5 MINUTES ONCE EVERY 2 WEEKS AS DIRECTED    DESOXIMETASONE 0.25 % EXTERNAL CREAM    Apply topically 2 (two) times daily.    ECONAZOLE NITRATE 1 % EXTERNAL CREAM    Apply topically 2 (two) times daily.    FLUOCINOLONE ACETONIDE 0.01 % OTIC OIL    APPLY 2 DROPS INTO THE AFFECTED EAR 2 TIMES A DAY AS NEEDED    HALOBETASOL  PROPIONATE 0.05 % EXTERNAL OINTMENT    Apply topically 2 (two) times daily.    MULTIPLE VITAMINS-MINERALS (ONE-A-DAY MENS HEALTH FORMULA) ORAL TAB    Take 1 tablet by mouth.    MUPIROCIN 2 % EXTERNAL OINTMENT    Apply 1 Application topically 2 (two) times daily.    NAPROXEN 500 MG ORAL TAB    Take 1 tablet (500 mg total) by mouth 2 (two) times daily with meals.    SUCRALFATE 1 G ORAL TAB    Take 1 tablet (1 g total) by mouth 4 (four) times daily before meals and nightly.   Modified Medications    No medications on file   Discontinued Medications    No medications on file       SOCIAL HISTORY  Social History     Socioeconomic History    Marital status:    Tobacco Use    Smoking status: Former    Smokeless tobacco: Never   Vaping Use    Vaping Use: Never used   Substance and Sexual Activity    Alcohol use: Not Currently    Drug use: Never       FAMILY HISTORY  Family History   Problem Relation Age of Onset    Diabetes Father     Diabetes Mother        PHYSICAL EXAM  Vital signs:  BP (!) 155/98   Pulse 92   Temp 97.9 °F (36.6 °C) (Oral)   Resp 19   Ht 5' 11\" (1.803 m)   Wt (!) 365 lb (165.6 kg)   SpO2 96%   BMI 50.91 kg/m²      GENERAL:  Awake and alert, in no acute distress.  HEART:  Regular rhythm.  Tachycardia  LUNGS:  Air entry was decreased.  No increased work of breathing or wheezes   ABDOMEN: Soft and non-tender.    PSYCHIATRIC: Normal mood      IMAGING  XR CHEST PA + LAT CHEST (CPT=71046)    Result Date: 3/26/2024  CONCLUSION:  Minimal basilar reticular opacities may be atelectatic, with or without superimposed acute infectious process.    Dictated by (CST): Fabrice Zamora MD on 3/26/2024 at 9:50 AM     Finalized by (CST): Fabrice Zamora MD on 3/26/2024 at 9:51 AM           Data:  Recent Labs   Lab 03/26/24  0938 03/26/24  1225   RBC  --  4.90   HGB  --  13.9   HCT  --  41.7   MCV 85.2 85.1   MCH  --  28.4   MCHC 32.5 33.3   RDW  --  14.0   NEPRELIM  --  6.40   WBC  --  9.0   PLT  --  296.0      Recent Labs   Lab 03/26/24  1225   GLU 93   BUN 12   CREATSERUM 1.10   CA 8.9   ALB 4.1      K 3.5      CO2 24.0   ALKPHO 94   AST 21   ALT 18   BILT 0.4   TP 7.1       ASSESSMENT/PLAN    Cough, fever, shortness of breath  -Unclear etiology  -Possible viral infection  -COVID, flu, RSV negative  -Patient reports daughter with similar symptoms at home.  -Chest x-ray reviewed.  Noted minimal basilar reticular opacities.  Consistent with possible atelectasis.  -Antipyretics for fevers  -Symptom relief as able  -Continue to monitor    Brugada syndrome  -Patient presenting with fevers, cough, chest pain  -EKG done in ED with signs consistent with possible Brugada type I pattern.  -Correcting potassium and magnesium with electrolyte protocol  -Cardiology consulted, recommend continuing monitoring.  -Check echocardiogram  -EP evaluation    Elevated blood pressure readings  -Patient denies history of hypertension  -Not on home antihypertensive agents  -Continue to monitor and add in as needed      Plan of care discussed with patient at bedside.  Discussed with cardiology, ED physician and RN. Decision made that pt needs hospitalization for further management/monitoring.      Avi Hawkins MD    This note was prepared using Dragon Medical voice recognition dictation software. As a result errors may occur. When identified these errors have been corrected. While every attempt is made to correct errors during dictation discrepancies may still exist

## 2024-03-26 NOTE — CONSULTS
Cardiology Consult Note    Robert Subramanian Patient Status:  Emergency    1985 MRN [id number]   Location [facility] EMERGENCY DEPARTMENT Attending Kari Mandujano MD   Hosp Day # 0 PCP Adriana Tan MD     HPI.  Robert Subramanian is a 38 year old male with a history of hyperlipidemia presenting with viral symptoms of cough, fever, fatigue, shortness of breath and chest pain.  Patient had fevers as high as 103 at home.  Triage vitals pertinent for /91 mm per, pulse 100, respiratory 20, temperature 97.9, SpO2 99%.  EKG showed dynamic ST changes including when EKG shows Brugada type I pattern.  Lab work shows low normal potassium and magnesium.  CBC normal.    Patient states he has no known family history of sudden cardiac death.  He is never passed out or had near syncope, palpitations.  Is never been told to have any cardiac/EKG abnormalities previously.      --------------------------------------------------------------------------------------------------------------------------------  ROS 10 systems reviewed, pertinent findings above.  ROS    History:  Past Medical History:   Diagnosis Date    Hyperlipidemia     Visual impairment     glasses     Past Surgical History:   Procedure Laterality Date    OTHER SURGICAL HISTORY  2019    excision chest wall cyst X 2      Family History   Problem Relation Age of Onset    Diabetes Father     Diabetes Mother       reports that he has quit smoking. He has never used smokeless tobacco. He reports that he does not currently use alcohol. He reports that he does not use drugs.    Objective:   Temp: 97.9 °F (36.6 °C)  Pulse: 97  Resp: 21  BP: 165/96    Intake/Output:   No intake or output data in the 24 hours ending 24 1320    Physical Exam:     General: Alert and oriented x 3, no acute distress  HEENT: Normocephalic, anicteric sclera, neck supple.  Neck: No JVD, carotids 2+, no bruits.  Cardiac: Regular rate and rhythm.  S1, S2 normal. No murmur, pericardial rub, S3.  Lungs: Clear without wheezes, rales, rhonchi or dullness.  Normal excursions and effort.  Abdomen: Soft, non-tender. BS-present.  Extremities: Without clubbing, cyanosis or edema.  Peripheral pulses are 2+.  Neurologic: Non-focal  Skin: Warm and dry.       Assessment:    Acute viral illness  Cough, shortness of breath, chest pain  Brugada type I  Hyperlipidemia      Plan:  EKG consistent with Brugada type I, recommend aggressive fever control.  Ensure electrolytes are within normal range  Recommend echocardiogram this admission  EP evaluation for further recommendations  MCT at time of discharge to rule out ventricular arrhythmias    Thank you for allowing me to take part in the care of Robert Subramanian. Please call with any questions of concerns.      Level of care: C5    Dr. Graeme Farrar,   March 26, 2024  1:20 PM

## 2024-03-26 NOTE — ED QUICK NOTES
Orders for admission, patient is aware of plan and ready to go upstairs. Any questions, please call ED RN Valeria at extension [phone].     Patient Covid vaccination status: Unvaccinated     COVID Test Ordered in ED: SARS-CoV-2/Flu A and B/RSV by PCR (GeneXpert)    COVID Suspicion at Admission: N/A    Running Infusions:  None    Mental Status/LOC at time of transport: A&Ox4    Other pertinent information:   CIWA score: N/A   NIH score:  N/A

## 2024-03-26 NOTE — ED PROVIDER NOTES
Chief Complaint   Patient presents with    Cough/URI       HPI:     Robert Subramanian is a 38 year old male who presents for evaluation of bodyaches fever shortness of breath and chest pain overnight.  Denies any antipyretics prior to arrival, 103.  Agreeable to ibuprofen on assessment.  No sick contacts or exposures.  Notes previous history of bronchitis recent antibiotic or previous pneumonia.  Notes chest pain intermittent lasting every few hours overnight most pronounced within the last few hours, not post tussive.  Notes associated headache  body aches.  Denies dizziness ear pain neck pain sore throat abdominal pain vomiting diarrhea dysuria.      PFSH    PFSH asessment screens reviewed and agree.  Nurses notes reviewed I agree with documentation.    Family History   Problem Relation Age of Onset    Diabetes Father     Diabetes Mother      Family history reviewed with patient/caregiver and is not pertinent to presenting problem.  Social History     Socioeconomic History    Marital status:      Spouse name: Not on file    Number of children: Not on file    Years of education: Not on file    Highest education level: Not on file   Occupational History    Not on file   Tobacco Use    Smoking status: Former    Smokeless tobacco: Never   Vaping Use    Vaping Use: Never used   Substance and Sexual Activity    Alcohol use: Not Currently    Drug use: Never    Sexual activity: Not on file   Other Topics Concern    Not on file   Social History Narrative    Not on file     Social Determinants of Health     Financial Resource Strain: Not on file   Food Insecurity: Not on file   Transportation Needs: Not on file   Physical Activity: Not on file   Stress: Not on file   Social Connections: Not on file   Housing Stability: Not on file         ROS:   Positive for stated complaint: Chest pain shortness of breath body aches congestion cough  All other systems reviewed and negative except as noted  above.  Constitutional and Vital Signs Reviewed.      Physical Exam:     Findings:    BP (!) 151/93   Pulse 104   Temp 99.3 °F (37.4 °C) (Temporal)   Resp 20   SpO2 93%   GENERAL: well developed, well nourished, well hydrated, no distress  SKIN: good skin turgor, no obvious rashes  NECK: supple, no adenopathy  CARDIO: Mild purulence localized along keloid to right chest wall.  No associated erythema surrounding or warmth.  RRR without murmur  EXTREMITIES: no cyanosis or edema. DALE without difficulty  GI: soft, non-tender, normal bowel sounds  HEAD: normocephalic, atraumatic  EYES: sclera non icteric bilateral, conjunctiva clear  EARS: TMs clear bilaterally. Canals clear.  NOSE: Positive rhinorrhea.  MMM.  Nasal turbinates: pink, normal mucosa  THROAT: No erythema posterior pharynx.  Without exudates, uvula midline, and airway patent  LUNGS: Diminished lung sounds bases.  No retractions.   no rales, rhonchi, or wheezes  NEURO: No focal deficits  PSYCH: Alert and oriented x3.  Answering questions appropriately.  Mood appropriate.    MDM/Assessment/Plan:   Orders for this encounter:    Orders Placed This Encounter    XR CHEST PA + LAT CHEST (SSH=66456)     Order Specific Question:   What is the Relevant Clinical Indication / Reason for Exam?     Answer:   shortness of breath, cough     Order Specific Question:   Release to patient     Answer:   Immediate    POCT CBC     Order Specific Question:   Release to patient     Answer:   Immediate    EKG     Order Specific Question:   Release to patient     Answer:   Immediate    POCT Glucose    POCT ISTAT chem8 cartridge    ISTAT Troponin    POCT Flu Test     Order Specific Question:   Release to patient     Answer:   Immediate    Rapid SARS-CoV-2 by PCR     Order Specific Question:   Release to patient     Answer:   Immediate    Aerobic Bacterial Culture     Order Specific Question:   Spec desc:     Answer:   Drainage [[id number]]     Order Specific Question:   Release to  patient     Answer:   Immediate    iStat (Chem 8)    iStat (Troponin)    ibuprofen (Motrin) tab 600 mg    sodium chloride 0.9% infusion 1,000 mL    ipratropium-albuterol (Duoneb) 0.5-2.5 (3) MG/3ML inhalation solution 3 mL     Order Specific Question:   Which area/hospital     Answer:   Intermittent nebulizer    acetaminophen (Tylenol Extra Strength) tab 1,000 mg       Labs performed this visit:  Recent Results (from the past 10 hour(s))   POCT Flu Test    Collection Time: 03/26/24  9:30 AM    Specimen: Nares; Other   Result Value Ref Range    POCT INFLUENZA A Negative Negative    POCT INFLUENZA B Negative Negative   Rapid SARS-CoV-2 by PCR    Collection Time: 03/26/24  9:30 AM    Specimen: Nares; Other   Result Value Ref Range    Rapid SARS-CoV-2 by PCR Not Detected Not Detected   POCT Glucose    Collection Time: 03/26/24  9:34 AM   Result Value Ref Range    POC Glucose  94 70 - 99 mg/dL   POCT CBC    Collection Time: 03/26/24  9:38 AM   Result Value Ref Range    WBC IC 9.3 4.0 - 11.0 x10ˆ3/uL    RBC IC 5.13 4.30 - 5.70 X10ˆ6/uL    HGB IC 14.2 13.0 - 17.5 g/dL    HCT IC 43.7 39.0 - 53.0 %    MCV IC 85.2 80.0 - 100.0 fL    MCH IC 27.7 26.0 - 34.0 pg    MCHC IC 32.5 31.0 - 37.0 g/dL    PLT .0 150.0 - 450.0 X10ˆ3/uL    # Neutrophil 7.0 1.5 - 7.7 X10ˆ3/uL    # Lymphocyte 1.5 1.0 - 4.0 X10ˆ3/uL    # Mixed Cells 0.8 0.1 - 1.0 X10ˆ3/uL    Neutrophil % 75.8 %    Lymphocyte % 15.9 %    Mixed Cell % 8.3 %   POCT ISTAT chem8 cartridge    Collection Time: 03/26/24  9:54 AM   Result Value Ref Range    ISTAT Sodium 138 136 - 145 mmol/L    ISTAT BUN 13 7 - 18 mg/dL    ISTAT Potassium 3.6 3.6 - 5.1 mmol/L    ISTAT Chloride 101 98 - 112 mmol/L    ISTAT Ionized Calcium 1.16 1.12 - 1.32 mmol/L    ISTAT Hematocrit 48 37 - 53 %    ISTAT Glucose 98 70 - 99 mg/dL    ISTAT TCO2 24 21 - 32 mmol/L    ISTAT Creatinine 1.20 0.70 - 1.30 mg/dL    eGFR-Cr 79 >=60 mL/min/1.73m2   EKG    Collection Time: 03/26/24  9:54 AM   Result Value Ref  Range    Ventricular rate 116 BPM    Atrial rate 116 BPM    P-R Interval 166 ms    QRS Duration 108 ms    Q-T Interval 324 ms    QTC Calculation (Bezet) 450 ms    P Axis 23 degrees    R Axis 219 degrees    T Axis 24 degrees   ISTAT Troponin    Collection Time: 03/26/24  9:59 AM   Result Value Ref Range    ISTAT Troponin <0.02 <0.045 ng/mL ng/mL       MDM:  Patient given antipyretics in form of ibuprofen Tylenol upon arrival with mild improvement in fever and tachycardia.  Saturating 93% after initial nebulizer . patient initial metabolic profile showed no leukocytosis, chemistries unremarkable. ECG provided after blood draw, showed findings of 116 bpm sinus tachycardia with Brugada type I noted with ST changes.  Advised to go to the emergency department for higher level of care based on ECG/hx concerns, noted this to PCP by Adeline.  Initial troponin prior to disposition normal;  viral panel negative, chest x-ray shows questionable reticular bibasilar infiltrates.  Patient agrees with further follow through the emergency room. Offered EMS via private vehicle; declined.  Lucid decisional; Dr. Addison notified throughout plan of care.     Diagnosis:    ICD-10-CM    1. Abnormal ECG  R94.31       2. Chest pain of uncertain etiology  R07.9       3. Pneumonia of both lower lobes due to infectious organism  J18.9           All results reviewed and discussed with patient.  See AVS for detailed discharge instructions for your condition today.    Follow Up with:  Adriana Peters MD  [address]  [place] [id number] [phone]

## 2024-03-27 ENCOUNTER — APPOINTMENT (OUTPATIENT)
Dept: GENERAL RADIOLOGY | Facility: HOSPITAL | Age: 39
End: 2024-03-27
Attending: HOSPITALIST
Payer: COMMERCIAL

## 2024-03-27 ENCOUNTER — APPOINTMENT (OUTPATIENT)
Dept: CV DIAGNOSTICS | Facility: HOSPITAL | Age: 39
End: 2024-03-27
Attending: INTERNAL MEDICINE
Payer: COMMERCIAL

## 2024-03-27 LAB
ALBUMIN SERPL-MCNC: 3.9 G/DL (ref 3.2–4.8)
ALBUMIN/GLOB SERPL: 1.3 {RATIO} (ref 1–2)
ALP LIVER SERPL-CCNC: 93 U/L
ALT SERPL-CCNC: 22 U/L
ANION GAP SERPL CALC-SCNC: 6 MMOL/L (ref 0–18)
AST SERPL-CCNC: 23 U/L (ref ?–34)
ATRIAL RATE: 100 BPM
ATRIAL RATE: 116 BPM
BASOPHILS # BLD AUTO: 0.09 X10(3) UL (ref 0–0.2)
BASOPHILS NFR BLD AUTO: 1.4 %
BILIRUB SERPL-MCNC: 0.4 MG/DL (ref 0.3–1.2)
BUN BLD-MCNC: 13 MG/DL (ref 9–23)
BUN/CREAT SERPL: 12.9 (ref 10–20)
CALCIUM BLD-MCNC: 8.6 MG/DL (ref 8.7–10.4)
CHLORIDE SERPL-SCNC: 108 MMOL/L (ref 98–112)
CO2 SERPL-SCNC: 25 MMOL/L (ref 21–32)
CREAT BLD-MCNC: 1.01 MG/DL
DEPRECATED RDW RBC AUTO: 43.8 FL (ref 35.1–46.3)
EGFRCR SERPLBLD CKD-EPI 2021: 97 ML/MIN/1.73M2 (ref 60–?)
EOSINOPHIL # BLD AUTO: 0.12 X10(3) UL (ref 0–0.7)
EOSINOPHIL NFR BLD AUTO: 1.8 %
ERYTHROCYTE [DISTWIDTH] IN BLOOD BY AUTOMATED COUNT: 14.1 % (ref 11–15)
GLOBULIN PLAS-MCNC: 3.1 G/DL (ref 2.8–4.4)
GLUCOSE BLD-MCNC: 91 MG/DL (ref 70–99)
HCT VFR BLD AUTO: 41.7 %
HGB BLD-MCNC: 13.8 G/DL
IMM GRANULOCYTES # BLD AUTO: 0.06 X10(3) UL (ref 0–1)
IMM GRANULOCYTES NFR BLD: 0.9 %
LYMPHOCYTES # BLD AUTO: 1.23 X10(3) UL (ref 1–4)
LYMPHOCYTES NFR BLD AUTO: 18.8 %
MAGNESIUM SERPL-MCNC: 2.1 MG/DL (ref 1.6–2.6)
MCH RBC QN AUTO: 28.4 PG (ref 26–34)
MCHC RBC AUTO-ENTMCNC: 33.1 G/DL (ref 31–37)
MCV RBC AUTO: 85.8 FL
MONOCYTES # BLD AUTO: 0.94 X10(3) UL (ref 0.1–1)
MONOCYTES NFR BLD AUTO: 14.4 %
NEUTROPHILS # BLD AUTO: 4.09 X10 (3) UL (ref 1.5–7.7)
NEUTROPHILS # BLD AUTO: 4.09 X10(3) UL (ref 1.5–7.7)
NEUTROPHILS NFR BLD AUTO: 62.7 %
OSMOLALITY SERPL CALC.SUM OF ELEC: 288 MOSM/KG (ref 275–295)
P AXIS: 23 DEGREES
P AXIS: 25 DEGREES
P-R INTERVAL: 166 MS
P-R INTERVAL: 176 MS
PLATELET # BLD AUTO: 266 10(3)UL (ref 150–450)
POTASSIUM SERPL-SCNC: 4.1 MMOL/L (ref 3.5–5.1)
PROT SERPL-MCNC: 7 G/DL (ref 5.7–8.2)
Q-T INTERVAL: 324 MS
Q-T INTERVAL: 358 MS
QRS DURATION: 108 MS
QRS DURATION: 120 MS
QTC CALCULATION (BEZET): 450 MS
QTC CALCULATION (BEZET): 461 MS
R AXIS: -59 DEGREES
R AXIS: 219 DEGREES
RBC # BLD AUTO: 4.86 X10(6)UL
SODIUM SERPL-SCNC: 139 MMOL/L (ref 136–145)
T AXIS: 24 DEGREES
T AXIS: 26 DEGREES
VENTRICULAR RATE: 100 BPM
VENTRICULAR RATE: 116 BPM
WBC # BLD AUTO: 6.5 X10(3) UL (ref 4–11)

## 2024-03-27 PROCEDURE — 99233 SBSQ HOSP IP/OBS HIGH 50: CPT | Performed by: HOSPITALIST

## 2024-03-27 PROCEDURE — 93306 TTE W/DOPPLER COMPLETE: CPT | Performed by: INTERNAL MEDICINE

## 2024-03-27 PROCEDURE — 71045 X-RAY EXAM CHEST 1 VIEW: CPT | Performed by: HOSPITALIST

## 2024-03-27 RX ORDER — DESOXIMETASONE 0.5 MG/G
CREAM TOPICAL 2 TIMES DAILY
Status: DISCONTINUED | OUTPATIENT
Start: 2024-03-27 | End: 2024-03-27 | Stop reason: ALTCHOICE

## 2024-03-27 RX ORDER — METOPROLOL SUCCINATE 25 MG/1
25 TABLET, EXTENDED RELEASE ORAL
Status: DISCONTINUED | OUTPATIENT
Start: 2024-03-28 | End: 2024-03-28

## 2024-03-27 RX ORDER — TRIAMCINOLONE ACETONIDE 5 MG/G
CREAM TOPICAL 2 TIMES DAILY
Status: DISCONTINUED | OUTPATIENT
Start: 2024-03-27 | End: 2024-03-28

## 2024-03-27 NOTE — PROGRESS NOTES
Cardiology Progress Note    Robert Subramanian Patient Status:  Observation    1985 MRN [id number]   Location [facility] 3W/SW Attending Gloria Aviles MD   Hosp Day # 0 PCP Adriana Tan MD     Interval Note:  Pt feels well this morning  Denies fever, Cough, CP, shortness of breath  Tele ok    --------------------------------------------------------------------------------------------------------------------------------  ROS 12 systems reviewed, pertinent findings above.  ROS    History:  Past Medical History:   Diagnosis Date    Hyperlipidemia     Visual impairment     glasses     Past Surgical History:   Procedure Laterality Date    OTHER SURGICAL HISTORY  2019    excision chest wall cyst X 2      Family History   Problem Relation Age of Onset    Diabetes Father     Diabetes Mother       reports that he has quit smoking. He has never used smokeless tobacco. He reports that he does not currently use alcohol. He reports that he does not use drugs.    Objective:   Temp: 100.2 °F (37.9 °C)  Pulse: 107  Resp: 20  BP: 170/89    Intake/Output:   No intake or output data in the 24 hours ending 24 1151    Physical Exam:    General: AOx3  HEENT: Normocephalic, anicteric sclera, neck supple.  Neck: No JVD, carotids 2+, no bruits.  Cardiac: Regular rate and rhythm. S1, S2 normal. No murmur, pericardial rub, S3.  Lungs: Clear without wheezes, rales, rhonchi or dullness.  Normal excursions and effort.  Abdomen: Soft, non-tender. BS-present.  Extremities: Without clubbing, cyanosis or edema.  Peripheral pulses are 2+.  Neurologic: Non-focal  Skin: Warm and dry.       Assessment:    Acute viral illness  Cough, shortness of breath, chest pain  Brugada type I  Hyperlipidemia      Plan:  - Pt stable overnight, appears well this morning  - No events on tele  - Echo pending  - MCT 30 days at time of discharge  - Recommend EP consult    Thank you for allowing me to take part in the care  of Robert Subramanian. Please call with any questions of concerns.      Level of care: L3    Graeme Farrar DO  [facility]   Interventional Cardiac and Vascular Services      Graeme Farrar DO  March 27, 2024  11:51 AM

## 2024-03-27 NOTE — PROGRESS NOTES
[facility]  part of [facility]    Progress Note    Robert Subramanian Patient Status:  Observation    1985 MRN [id number]   Location [facility] 3W/SW Attending Gloria Aviles MD   Hosp Day # 0 PCP Adriana Tan MD       Subjective:   Robert Subramanian had a fever of 100.4 this morning. CXR ordered again. Also states he has a cough.     Objective:   Blood pressure (!) 170/89, pulse 107, temperature 100.2 °F (37.9 °C), temperature source Oral, resp. rate 20, height 5' 11\" (1.803 m), weight (!) 365 lb (165.6 kg), SpO2 93%.    Physical Exam:    General: No acute distress.   Respiratory: Clear to auscultation bilaterally. No wheezes. No rhonchi.  Cardiovascular: S1, S2. Regular rate and rhythm. No murmurs, rubs or gallops.   Abdomen: Soft, nontender, nondistended.  Positive bowel sounds. No rebound or guarding.  Neurologic: No focal neurological deficits.   Musculoskeletal: Moves all extremities.  Extremities: No edema.    Results:     Lab Results   Component Value Date    WBC 6.5 2024    HGB 13.8 2024    HCT 41.7 2024    .0 2024    CREATSERUM 1.01 2024    BUN 13 2024     2024    K 4.1 2024     2024    CO2 25.0 2024    GLU 91 2024    CA 8.6 (L) 2024    ALB 3.9 2024    ALKPHO 93 2024    BILT 0.4 2024    TP 7.0 2024    AST 23 2024    ALT 22 2024    TSH 1.130 2023    LIP 22 2023    DDIMER 0.38 2022    MG 2.1 2024    TROP <0.045 2020     10/10/2022       XR CHEST AP PORTABLE  (CPT=71045)    Result Date: 3/27/2024  CONCLUSION:   No focal opacity, pleural effusion, or pneumothorax.  Mild prominence of the pulmonary markings, which may be secondary to low lung volumes or small airways disease.    Dictated by (CST): Jose Sandra MD on 3/27/2024 at 2:07 PM     Finalized by (CST): Jose Sandra MD on  3/27/2024 at 2:09 PM          XR CHEST PA + LAT CHEST (CPT=71046)    Result Date: 3/26/2024  CONCLUSION:  Minimal basilar reticular opacities may be atelectatic, with or without superimposed acute infectious process.    Dictated by (CST): Fabrice Zamora MD on 3/26/2024 at 9:50 AM     Finalized by (CST): Fabrice Zamora MD on 3/26/2024 at 9:51 AM         EKG 12 Lead    Result Date: 3/27/2024  Normal sinus rhythm Left axis deviation Inferior infarct (cited on or before 23-SEP-2023) Abnormal ECG When compared with ECG of 26-MAR-2024 09:54, No significant change was found Confirmed by DAVID TATE ([id number]) on 3/27/2024 2:19:10 PM    EKG    Result Date: 3/27/2024  Sinus tachycardia Right superior axis deviation Brugada pattern, type 1 Inferior infarct (cited on or before 23-SEP-2023) Anterior infarct , age undetermined Abnormal ECG When compared with ECG of 23-SEP-2023 15:50, Anterior infarct is now Present Confirmed by DAVID TATE ([id number]) on 3/27/2024 2:15:20 PM      desoximetasone   Topical BID    heparin  7,500 Units Subcutaneous Q8H RAMY     acetaminophen **OR** HYDROcodone-acetaminophen **OR** HYDROcodone-acetaminophen, ondansetron, benzonatate, acetaminophen, ibuprofen      Assessment and Plan:       Cough, fever, shortness of breath  -Unclear etiology  -Possible viral infection  -COVID, flu, RSV negative  -Patient reports daughter with similar symptoms at home.  -Chest x-ray reviewed.  Noted minimal basilar reticular opacities.    - stat CXR again due to fever  - blood cx pending   -Consistent with possible atelectasis.  -Antipyretics for fevers  -Symptom relief as able  -Continue to monitor     Brugada syndrome  -Patient presenting with fevers, cough, chest pain  -EKG done in ED with signs consistent with possible Brugada type I pattern.  -Correcting potassium and magnesium with electrolyte protocol  -Cardiology consulted, recommend continuing monitoring.  -Check echocardiogram  -EP evaluation     Elevated blood  pressure readings  -Patient denies history of hypertension  -Not on home antihypertensive agents  -will add BP meds today            Quality:  DVT Prophylaxis: Heparin  CODE status: Full    MDM High. Time spent on chart/note review, review of labs/imaging, discussion with patient, physical exam, discussion with staff, consultants, coordinating care, writing progress note, and discussion of plan of care.       BEN ORNELAS MD  03/27/24

## 2024-03-27 NOTE — PLAN OF CARE
Patient alert and oriented x 4. Vitals stable on room air. Patient's pain managed with PO medication. Plan to continue to monitor and discharge once medically cleared. Call light within reach.    Problem: Patient Centered Care  Goal: Patient preferences are identified and integrated in the patient's plan of care  Description: Interventions:  - What would you like us to know as we care for you? From home with family  - Provide timely, complete, and accurate information to patient/family  - Incorporate patient and family knowledge, values, beliefs, and cultural backgrounds into the planning and delivery of care  - Encourage patient/family to participate in care and decision-making at the level they choose  - Honor patient and family perspectives and choices  Outcome: Progressing     Problem: Patient/Family Goals  Goal: Patient/Family Long Term Goal  Description: Patient's Long Term Goal: Be able to discharge    Interventions:  - Monitor heart rhythm  -monitor labs  - See additional Care Plan goals for specific interventions  Outcome: Progressing  Goal: Patient/Family Short Term Goal  Description: Patient's Short Term Goal: Resolve heart rhythm    Interventions:   - Medication administration  - Monitor labs  - See additional Care Plan goals for specific interventions  Outcome: Progressing

## 2024-03-28 VITALS
DIASTOLIC BLOOD PRESSURE: 91 MMHG | RESPIRATION RATE: 18 BRPM | OXYGEN SATURATION: 93 % | HEART RATE: 90 BPM | WEIGHT: 315 LBS | BODY MASS INDEX: 44.1 KG/M2 | TEMPERATURE: 99 F | SYSTOLIC BLOOD PRESSURE: 148 MMHG | HEIGHT: 71 IN

## 2024-03-28 LAB
ANION GAP SERPL CALC-SCNC: 6 MMOL/L (ref 0–18)
BUN BLD-MCNC: 11 MG/DL (ref 9–23)
BUN/CREAT SERPL: 10.5 (ref 10–20)
CALCIUM BLD-MCNC: 9.4 MG/DL (ref 8.7–10.4)
CHLORIDE SERPL-SCNC: 105 MMOL/L (ref 98–112)
CO2 SERPL-SCNC: 26 MMOL/L (ref 21–32)
CREAT BLD-MCNC: 1.05 MG/DL
DEPRECATED RDW RBC AUTO: 42.6 FL (ref 35.1–46.3)
EGFRCR SERPLBLD CKD-EPI 2021: 93 ML/MIN/1.73M2 (ref 60–?)
ERYTHROCYTE [DISTWIDTH] IN BLOOD BY AUTOMATED COUNT: 14 % (ref 11–15)
GLUCOSE BLD-MCNC: 96 MG/DL (ref 70–99)
HCT VFR BLD AUTO: 44.9 %
HGB BLD-MCNC: 15.1 G/DL
MCH RBC QN AUTO: 28.1 PG (ref 26–34)
MCHC RBC AUTO-ENTMCNC: 33.6 G/DL (ref 31–37)
MCV RBC AUTO: 83.6 FL
OSMOLALITY SERPL CALC.SUM OF ELEC: 283 MOSM/KG (ref 275–295)
PLATELET # BLD AUTO: 288 10(3)UL (ref 150–450)
POTASSIUM SERPL-SCNC: 4 MMOL/L (ref 3.5–5.1)
RBC # BLD AUTO: 5.37 X10(6)UL
SODIUM SERPL-SCNC: 137 MMOL/L (ref 136–145)
WBC # BLD AUTO: 5.8 X10(3) UL (ref 4–11)

## 2024-03-28 PROCEDURE — 99239 HOSP IP/OBS DSCHRG MGMT >30: CPT | Performed by: HOSPITALIST

## 2024-03-28 RX ORDER — METOPROLOL SUCCINATE 25 MG/1
25 TABLET, EXTENDED RELEASE ORAL
Qty: 30 TABLET | Refills: 1 | Status: SHIPPED | OUTPATIENT
Start: 2024-03-29

## 2024-03-28 RX ORDER — BENZONATATE 200 MG/1
200 CAPSULE ORAL 3 TIMES DAILY PRN
Qty: 45 CAPSULE | Refills: 0 | Status: SHIPPED | OUTPATIENT
Start: 2024-03-28 | End: 2024-04-12

## 2024-03-28 NOTE — BH RN DISCHARGE NOTE
Pt cleared and ready for discharge. IV and tele removed. Pt paperwork and education provided. Discussed follow up with EP and new medications. All questions answered. Pt walked out and driven home by wife.

## 2024-03-28 NOTE — CONSULTS
[facility]                                                            CONSULT NOTE      Patient Name: Robert Subramanian MRN: [id number]   : 1985 CSN: [id number]       Reason for consultation:   Asked by Dr Farrar to provide evaluation and management of abnormal ECG.    Assessment and Recommendations:     Brugada pattern ECG  -Pt does not have Brugada syndrome, per se.    -Dynamic ECG changes, with fever he had a Type 1 Brugada pattern ECG.  This resolved as his fever did.  -He has no symptoms of arrhythmia, no syncope, and no family history of the same    RECS:  -Treat fevers immediately, use antipyretics as needed  -Review website www.brugadadrugs.ord for substances/meds to avoid and for further pt education  -See me in office in 1-2 weeks.  We will plan genetic testing for Brugada mutations, which would have implications for screening his 5 children (age 2-20).  We will also place a 30-day monitor in the office that day.    From CV perspective, he can be discharged.    Thank you for the EP consultation.    Zac Ahumada MD  Cardiac EP  [facility]  3/28/2024  C5-EP      History of present illness:   The patient is a 38 year old who was admitted with a fever, dyspnea, cough, and diagnosed with a URI.  He has no palpitations or syncope.     ROS:   Constitutional: + fevers, chills, + fatigue, + sweats.  ENT: No mouth pain, neck pain, running nose, headaches or swollen glands.  Skin: No rashes, pruritus or skin changes,  Respiratory: + cough, + shortness of breath.  CV: + intermittent chest ache, sharp  Musculoskeletal: No joint pain, stiffness or swelling.  : No dysuria or hematuria.  GI: No nausea, vomiting or diarrhea. No blood in stools.  Neurologic: No seizures, tremors, weakness or numbness.    Past Medical, Surgical, Family, and Social Histories:     Past Medical History:    Diagnosis Date    Hyperlipidemia     Visual impairment     glasses     Past Surgical History:   Procedure Laterality Date    OTHER SURGICAL HISTORY  12/19/2019    excision chest wall cyst X 2      Family History   Problem Relation Age of Onset    Diabetes Father     Diabetes Mother        SHX:  The patient reports that he has quit smoking. He has never used smokeless tobacco. He reports that he does not currently use alcohol. He reports that he does not use drugs.    Allergies:   No Known Allergies    Medications:      metoprolol succinate ER  25 mg Oral Daily Beta Blocker    triamcinolone   Topical BID    heparin  7,500 Units Subcutaneous Q8H RAMY         PHYSICAL EXAM:   Blood pressure (!) 148/91, pulse 90, temperature 98.5 °F (36.9 °C), temperature source Oral, resp. rate 18, height 5' 11\" (1.803 m), weight (!) 368 lb 3.2 oz (167 kg), SpO2 93%.  GEN - no distress  HEENT - normal conjunctiva, moist mucosa  Neck - Supple, no LAD  Lungs - nonlabored, clear bilaterally  Heart - JVP 14 cm H2O, carotids normal; RRR, nl S1/S2, no murmur, gallop, or rub; no edema  Abd - soft, nontender  Ext - arthritic changes  Neuro - A+Ox3, no facial droop or gross deficits  Psych - cooperative, calm    LABS AND DATA:     ECG #1: sinus, type 1 Brugada pattern    ECG #2: sinus, atypical RBBB, type 2 Brugada pattern    Lab Results   Component Value Date    WBC 5.8 03/28/2024    HGB 15.1 03/28/2024    HCT 44.9 03/28/2024    .0 03/28/2024    CREATSERUM 1.05 03/28/2024    BUN 11 03/28/2024     03/28/2024    K 4.0 03/28/2024     03/28/2024    CO2 26.0 03/28/2024    GLU 96 03/28/2024    CA 9.4 03/28/2024    ALB 3.9 03/27/2024    ALKPHO 93 03/27/2024    BILT 0.4 03/27/2024    TP 7.0 03/27/2024    AST 23 03/27/2024    ALT 22 03/27/2024    TSH 1.130 06/28/2023    LIP 22 04/19/2023    DDIMER 0.38 01/02/2022    MG 2.1 03/27/2024    TROP <0.045 02/26/2020     10/10/2022       XR CHEST AP PORTABLE  (CPT=71045)    Result  Date: 3/27/2024  CONCLUSION:   No focal opacity, pleural effusion, or pneumothorax.  Mild prominence of the pulmonary markings, which may be secondary to low lung volumes or small airways disease.    Dictated by (CST): Jose Sandra MD on 3/27/2024 at 2:07 PM     Finalized by (CST): Jose Sandra MD on 3/27/2024 at 2:09 PM             ------------------------------------------------------------------------------------------------------------------------------

## 2024-03-28 NOTE — PLAN OF CARE
Pt alert and oriented x4. Independent. PRN Tessalon given. Pt updated on plan of care. Plan to discharge when medically cleared. Safety precautions in place. Call light within reach.    Problem: Patient Centered Care  Goal: Patient preferences are identified and integrated in the patient's plan of care  Description: Interventions:  - What would you like us to know as we care for you? From home w/ spouse  - Provide timely, complete, and accurate information to patient/family  - Incorporate patient and family knowledge, values, beliefs, and cultural backgrounds into the planning and delivery of care  - Encourage patient/family to participate in care and decision-making at the level they choose  - Honor patient and family perspectives and choices  Outcome: Progressing     Problem: Patient/Family Goals  Goal: Patient/Family Long Term Goal  Description: Patient's Long Term Goal: to have my skin better    Interventions:  - See additional Care Plan goals for specific interventions  Outcome: Progressing  Goal: Patient/Family Short Term Goal  Description: Patient's Short Term Goal: to have a better heart rhythm    Interventions:   - 30 day MCT at discharge  - See additional Care Plan goals for specific interventions  Outcome: Progressing     Problem: CARDIOVASCULAR - ADULT  Goal: Maintains optimal cardiac output and hemodynamic stability  Description: INTERVENTIONS:  - Monitor vital signs, rhythm, and trends  - Monitor for bleeding, hypotension and signs of decreased cardiac output  - Evaluate effectiveness of vasoactive medications to optimize hemodynamic stability  - Monitor arterial and/or venous puncture sites for bleeding and/or hematoma  - Assess quality of pulses, skin color and temperature  - Assess for signs of decreased coronary artery perfusion - ex. Angina  - Evaluate fluid balance, assess for edema, trend weights  Outcome: Progressing  Goal: Absence of cardiac arrhythmias or at baseline  Description:  INTERVENTIONS:  - Continuous cardiac monitoring, monitor vital signs, obtain 12 lead EKG if indicated  - Evaluate effectiveness of antiarrhythmic and heart rate control medications as ordered  - Initiate emergency measures for life threatening arrhythmias  - Monitor electrolytes and administer replacement therapy as ordered  Outcome: Progressing

## 2024-03-28 NOTE — DISCHARGE SUMMARY
[facility]  part of [facility]    Discharge Summary    Robert Subramanian Patient Status:  Observation    1985 MRN [id number]   Location [facility] 3W/SW Attending Gloria Aviles MD   Hosp Day # 0 PCP Adriana Tan MD     Date of Admission: 3/26/2024   Date of Discharge: 3/28/2024    Hospital Discharge Diagnoses: Acute Brugada Syndrome    Lace+ Score: 33  59-90 High Risk  29-58 Medium Risk  0-28   Low Risk.    TCM Follow-Up Recommendation:  LACE > 58: High Risk of readmission after discharge from the hospital.        Admitting Diagnosis: Brugada syndrome [I49.8]    Disposition: Home    Discharge Diagnosis: .Principal Problem:    Brugada syndrome      Hospital Course:   Reason for Admission:   This is a 38 year oldmale who presented complaining of fever.  Patient stated symptoms started on the evening prior to admission.  Patient noted associated cough, shortness of breath, fatigue and intermittent chest pain.  Symptoms failed to improve prompting visit to ED.  Patient does report his daughter has been sick at home with upper respiratory symptoms.  Patient states he was in his normal state of health prior to yesterday.  Patient reports cough has been dry, nonproductive.  Patient otherwise denied abdominal pain, nausea or vomiting.     In ED, patient had an EKG completed which was noted to be abnormal with signs concerning for possible Brugada type I pattern.  Patient was recommended for admission for further monitoring    Discharge Physical Exam:   Physical Exam:    General: No acute distress.   Respiratory: Clear to auscultation bilaterally. No wheezes. No rhonchi.  Cardiovascular: S1, S2. Regular rate and rhythm. No murmurs, rubs or gallops.   Abdomen: Soft, nontender, nondistended.  Positive bowel sounds. No rebound or guarding.  Neurologic: No focal neurological deficits.   Musculoskeletal: Moves all extremities.    Hospital Course:     Cough, fever, shortness  of breath  -Unclear etiology  -Possible viral infection  -COVID, flu, RSV negative  -Patient reports daughter with similar symptoms at home.  -Chest x-ray reviewed.  Noted minimal basilar reticular opacities.    - blood cx negative  - discharge on tessalon pearls for cough   -Consistent with possible atelectasis.  -Antipyretics for fevers  -Symptom relief as able  -Continue to monitor     Brugada syndrome  -Patient presenting with fevers, cough, chest pain  -EKG done in ED with signs consistent with possible Brugada type I pattern.  -Correcting potassium and magnesium with electrolyte protocol  -Cardiology consulted, recommend continuing monitoring.  -Check echocardiogram- reviewed  -EP evaluation  - 30 day event monitor  - genetic testing as an outpatient      Elevated blood pressure readings  -Patient denies history of hypertension  -Not on home antihypertensive agents  -home on BB  - blood pressure better               Quality:  DVT Prophylaxis: Heparin  CODE status: Full    Complications: none    Consultants         Provider   Role Specialty     Zac Ahumada MD  Consulting Physician Cardiac Electrophysiology     Avi Hawkins MD  Consulting Physician HOSPITALIST     Graeme Farrar DO  Consulting Physician Interventional, Cardiology     Evin Keller MD  Consulting Physician Interventional, Cardiology            Pending Labs       Order Current Status    Blood Culture In process    Blood Culture In process            Discharge Plan:   Discharge Condition: Stable    Current Discharge Medication List        New Orders    Details   metoprolol succinate ER 25 MG Oral Tablet 24 Hr Take 1 tablet (25 mg total) by mouth Daily Beta Blocker.      benzonatate 200 MG Oral Cap Take 1 capsule (200 mg total) by mouth 3 (three) times daily as needed for cough.                 Discharge Diet: Cardiac     Discharge Activity: As tolerated       Discharge Medications        START taking these medications         Instructions Prescription details   benzonatate 200 MG Caps  Commonly known as: Tessalon      Take 1 capsule (200 mg total) by mouth 3 (three) times daily as needed for cough.   Stop taking on: April 12, 2024  Quantity: 45 capsule  Refills: 0     metoprolol succinate ER 25 MG Tb24  Commonly known as: Toprol XL  Start taking on: March 29, 2024      Take 1 tablet (25 mg total) by mouth Daily Beta Blocker.   Quantity: 30 tablet  Refills: 1            STOP taking these medications      Ciclopirox 1 % Sham        Desoximetasone 0.25 % Crea  Commonly known as: TOPICORT                  Where to Get Your Medications        These medications were sent to [id number] - [place], [place] - [address] AT [facility], [phone], [phone]  [address], [facility] [address]      Phone: [phone]   benzonatate 200 MG Caps  metoprolol succinate ER 25 MG Tb24         Follow up:      Follow-up Information       Zac Ahumada MD Follow up in 1 week(s).    Specialties: Cardiac Electrophysiology, CARDIOLOGY  Why: Office will call you to schedule heart monitor and follow up visit for 1-2 weeks  Contact information:  [address]  [facility] [address] [phone]                             Follow up Labs and imaging:         Other Discharge Instructions:         Review website www.brugadadrugs.ord for substances/meds to avoid         Time spent:  > 30 minutes    BEN ORNELAS MD  3/28/2024

## 2024-03-28 NOTE — PLAN OF CARE
Problem: Patient Centered Care  Goal: Patient preferences are identified and integrated in the patient's plan of care  Description: Interventions:  - What would you like us to know as we care for you? From home w/ spouse  - Provide timely, complete, and accurate information to patient/family  - Incorporate patient and family knowledge, values, beliefs, and cultural backgrounds into the planning and delivery of care  - Encourage patient/family to participate in care and decision-making at the level they choose  - Honor patient and family perspectives and choices  Outcome: Progressing     Problem: Patient/Family Goals  Goal: Patient/Family Long Term Goal  Description: Patient's Long Term Goal: to have my skin better    Interventions:  - See additional Care Plan goals for specific interventions  Outcome: Progressing  Goal: Patient/Family Short Term Goal  Description: Patient's Short Term Goal: to have a better heart rhythm    Interventions:   - 30 day MCT at discharge  - See additional Care Plan goals for specific interventions  Outcome: Progressing     Problem: CARDIOVASCULAR - ADULT  Goal: Maintains optimal cardiac output and hemodynamic stability  Description: INTERVENTIONS:  - Monitor vital signs, rhythm, and trends  - Monitor for bleeding, hypotension and signs of decreased cardiac output  - Evaluate effectiveness of vasoactive medications to optimize hemodynamic stability  - Monitor arterial and/or venous puncture sites for bleeding and/or hematoma  - Assess quality of pulses, skin color and temperature  - Assess for signs of decreased coronary artery perfusion - ex. Angina  - Evaluate fluid balance, assess for edema, trend weights  Outcome: Progressing  Goal: Absence of cardiac arrhythmias or at baseline  Description: INTERVENTIONS:  - Continuous cardiac monitoring, monitor vital signs, obtain 12 lead EKG if indicated  - Evaluate effectiveness of antiarrhythmic and heart rate control medications as ordered  -  Initiate emergency measures for life threatening arrhythmias  - Monitor electrolytes and administer replacement therapy as ordered  Outcome: Progressing    Patient A&Ox4 on room air. Afebrile overnight. denies pain. No saline locked. Possible 30-day MCT at discharge. Up ad christine. Safety precautions maintained, call light and personal belongings within reach.

## 2024-03-28 NOTE — DISCHARGE INSTRUCTIONS
Review website [email or web address] for substances/meds to avoid

## 2024-03-28 NOTE — PROGRESS NOTES
Progress Note  Robert Rhett Subramanian Patient Status:  Observation    1985 MRN [id number]   Location [facility] 3W/SW Attending Gloria Aviles MD   Hosp Day # 0 PCP Adraina Tan MD     Subjective:  Pt states his symptoms of congestion, cough, SOB and chest pain have all resolved. Afebrile overnight. Reports fevers of 103-105 at home.     Objective:  BP (!) 148/91 (BP Location: Radial)   Pulse 90   Temp 98.5 °F (36.9 °C) (Oral)   Resp 18   Ht 5' 11\" (1.803 m)   Wt (!) 368 lb 3.2 oz (167 kg)   SpO2 93%   BMI 51.35 kg/m²     Telemetry: NSR    Intake/Output:    Intake/Output Summary (Last 24 hours) at 3/28/2024 0852  Last data filed at 3/28/2024 0300  Gross per 24 hour   Intake 900 ml   Output 850 ml   Net 50 ml       Last 3 Weights   24 0615 (!) 368 lb 3.2 oz (167 kg)   24 1141 (!) 365 lb (165.6 kg)   23 2005 (!) 350 lb (158.8 kg)   23 1606 (!) 353 lb (160.1 kg)       Labs:  Recent Labs   Lab 24  1225 24  2338 24  0533 24  0603   GLU 93  --  91 96   BUN 12  --  13 11   CREATSERUM 1.10  --  1.01 1.05   EGFRCR 88  --  97 93   CA 8.9  --  8.6* 9.4     --  139 137   K 3.5 4.2 4.1 4.0     --  108 105   CO2 24.0  --  25.0 26.0     Recent Labs   Lab 24  1225 24  0531 24  0603   RBC 4.90 4.86 5.37   HGB 13.9 13.8 15.1   HCT 41.7 41.7 44.9   MCV 85.1 85.8 83.6   MCH 28.4 28.4 28.1   MCHC 33.3 33.1 33.6   RDW 14.0 14.1 14.0   NEPRELIM 6.40 4.09  --    WBC 9.0 6.5 5.8   .0 266.0 288.0         Recent Labs   Lab 24  1225   TROPHS 11       Diagnostics:  XR CHEST AP PORTABLE  (CPT=71045)    Result Date: 3/27/2024  CONCLUSION:   No focal opacity, pleural effusion, or pneumothorax.  Mild prominence of the pulmonary markings, which may be secondary to low lung volumes or small airways disease.    Dictated by (CST): Jose Sandra MD on 3/27/2024 at 2:07 PM     Finalized by (CST): Jose Sandra MD on  3/27/2024 at 2:09 PM          Review of Systems   Constitutional: Negative.   Cardiovascular: Negative.    Respiratory: Negative.     Skin:         Keloid scarring       Physical Exam:    Gen: alert, oriented x 3, NAD  Heent: pupils equal, reactive. Mucous membranes moist.   Neck: no jvd  Cardiac: regular rate and rhythm, normal S1,S2, no murmur, clicks, rub or gallop  Lungs: Diminished  Abd: soft, NT/ND +bs  Ext: no edema  Skin: Warm, dry, keloid scarring R neck, R chest with scant drainage  Neuro: No focal deficits      Medications:     metoprolol succinate ER  25 mg Oral Daily Beta Blocker    triamcinolone   Topical BID    heparin  7,500 Units Subcutaneous Q8H RAMY           Assessment:  Acute Viral Illness - Unclear etiology  Presented with fever, cough, congestion, SOB, chest pain  CXR with basilar opacities, poss atelactesis  RVP negative, Troponin negative  Brugada Type I Pattern on ECG  No arrhythmias noted on tele overnight  Denies chest pain, palpitations  TTE with LVEF 50-55%, no RWMA  Hypertension  BP elevated 140's-170's  Started on toprol 25mg daily per PCP  Keloid Scarring  R chest keloid with small amt of drainage - non purulent appearing  WBC normal  Hyperlipidemia -  in 6/2023, not on statin  Elevated A1C 6.2 in 6/2023    Plan:  No events on telemetry overnight. Will recommend 30 day MCT on discharge  Toprol started this morning per primary. If BP remains elevated, may benefit from ACEI with elevated A1C.   Further recommendations to follow from EP service    Plan of care discussed with patient, RN.    Bere Coker, APRN  3/28/2024  8:52 AM  [phone] [facility]  [phone] [facility]

## 2024-03-29 ENCOUNTER — OFFICE VISIT (OUTPATIENT)
Dept: PODIATRY CLINIC | Facility: CLINIC | Age: 39
End: 2024-03-29
Payer: COMMERCIAL

## 2024-03-29 ENCOUNTER — TELEPHONE (OUTPATIENT)
Dept: FAMILY MEDICINE CLINIC | Facility: CLINIC | Age: 39
End: 2024-03-29

## 2024-03-29 ENCOUNTER — PATIENT OUTREACH (OUTPATIENT)
Dept: CASE MANAGEMENT | Age: 39
End: 2024-03-29

## 2024-03-29 VITALS — HEART RATE: 108 BPM | DIASTOLIC BLOOD PRESSURE: 90 MMHG | SYSTOLIC BLOOD PRESSURE: 138 MMHG

## 2024-03-29 DIAGNOSIS — M79.675 GREAT TOE PAIN, LEFT: ICD-10-CM

## 2024-03-29 DIAGNOSIS — Z02.9 ENCOUNTERS FOR UNSPECIFIED ADMINISTRATIVE PURPOSE: ICD-10-CM

## 2024-03-29 DIAGNOSIS — L60.0 INGROWN LEFT GREATER TOENAIL: Primary | ICD-10-CM

## 2024-03-29 DIAGNOSIS — I49.8 BRUGADA SYNDROME: Primary | ICD-10-CM

## 2024-03-29 PROCEDURE — 11750 EXCISION NAIL&NAIL MATRIX: CPT | Performed by: PODIATRIST

## 2024-03-29 PROCEDURE — 1111F DSCHRG MED/CURRENT MED MERGE: CPT

## 2024-03-29 NOTE — PROGRESS NOTES
Per Verbal orders from Dr. Guerra, draw up 1.5ml of 0.5% Marcaine and 1.5ml of 1% lidocaine for injection to left big toe. Adriana Grubbs

## 2024-03-29 NOTE — PROGRESS NOTES
Initial Post Discharge Follow Up   Discharge Date: 3/28/24  Contact Date: 3/29/2024    Consent Verification:  Assessment Completed With: Patient  HIPAA Verified?  Yes    Discharge Dx:   Acute Brugada Syndrome     General:   How have you been since your discharge from the hospital? Pt feeling better, since hospital discharge--started metoprolol Succinate and Benzonatate, as prescribed, tolerating diet, staying hydrated, independent with ADLs. Patient denies fever, chills, headache, vision changes, dizziness, nausea, vomiting, diarrhea, chest pain or shortness of breath at this time.   Do you have any pain since discharge?  No    How well was your pain managed while in the hospital?   On a scale of 1-5   1- Very Poor and 5- Very well   Very Well  When you were leaving the hospital were your discharge instructions reviewed with you? Yes  How well were your discharge instructions explained to you?   On a scale of 1-5   1- Very Poor and 5- Very well   Very Well  Do you have any questions about your discharge instructions?  No  Before leaving the hospital was your diagnoses explained to you? Yes  Do you have any questions about your diagnoses? No  Are you able to perform normal daily activities of living as you have prior to your hospital stay (dressing, bathing, ambulating to the bathroom, etc)? yes  (NCM) Was patient given a different diet per AVS? no  Medications:   Current Outpatient Medications   Medication Sig Dispense Refill    metoprolol succinate ER 25 MG Oral Tablet 24 Hr Take 1 tablet (25 mg total) by mouth Daily Beta Blocker. 30 tablet 1    benzonatate 200 MG Oral Cap Take 1 capsule (200 mg total) by mouth 3 (three) times daily as needed for cough. 45 capsule 0     Were there any changes to your current medication(s) noted on the AVS? Yes  START taking:  benzonatate (Tessalon)  metoprolol succinate ER (Toprol XL)  Start taking on: March 29, 2024  STOP taking:  Ciclopirox 1 % Sham  Desoximetasone 0.25 % Crea  (TOPICORT)  Review website www.[employer].[email or web address] for substances/meds to avoid  If so, were these medication changes discussed with you prior to leaving the hospital? Yes  If a new medication was prescribed:    Was the new medication's purpose & side effects reviewed? Yes  Do you have any questions about your new medication? No  Did you  your discharge medications when you left the hospital? Yes  Let's go over your medications together to make sure we are not missing anything. Medications Reviewed  Are there any reasons that keep you from taking your medication as prescribed? No  Are you having any concerns with constipation? No  Did patient receive their flu shot (Sept-March)? No    Discharge medications reviewed/discussed/and reconciled against outpatient medications with patient.  Any changes or updates to medications sent to PCP.  Patient Acknowledged     Referrals/orders at D/C:  Referrals/orders placed at D/C? yes  DME ordered at D/C? No    Discharge orders, AVS reviewed and discussed with patient. Any changes or updates to orders sent to PCP.  Patient Acknowledged      SDOH:   Transportation:    Transportation Needs: No Transportation Needs (3/29/2024)    Transportation Needs     Lack of Transportation: No     Financial Strain:    Financial Resource Strain: Low Risk  (3/29/2024)    Financial Resource Strain     Difficulty of Paying Living Expenses: Not very hard     Med Affordability: No     Follow up appointments:    Follow-up Information     MCT length of study: MCT 30 days  Testing should be scheduled on or before: 4/8/2024  Follow up With Specialties Details Why Contact Info   Zac Ahumada MD Cardiac Electrophysiology, CARDIOLOGY Follow up in 1 week(s) Office will call you to schedule heart monitor and follow up visit for 1-2 weeks [address]  [facility] [address]  [phone]      RECS:  -Treat fevers immediately, use antipyretics as needed  -Review website www.[employer].[email or web address] for  substances/meds to avoid and for further pt education  -See me in office in 1-2 weeks.  We will plan genetic testing for Brugada mutations, which would have implications for screening his 5 children (age 2-20).  We will also place a 30-day monitor in the office that day.     From CV perspective, he can be discharged.     Thank you for the EP consultation.     Zac Ahumada MD  Cardiac EP  [facility]  3/28/2024  C5-EP  Your appointments       Date & Time Appointment Department (Center)    Apr 26, 2024 11:30 AM CDT Follow Up Visit with Justin Guerra DPM [facility] ([facility])              [place] [address] [id number] [phone]            TCC  Was TCC ordered: No      PCP (If no TCC appointment)  Does patient already have a PCP appointment scheduled? No  NCM Attempted to schedule PCP office TCM appointment with patient   If no appointment scheduled: Explain: pt requests TCM appt with PCP, only    Specialist    Does the patient have any other follow up appointment(s) needing to be scheduled? No    Is there any reason as to why you cannot make your appointment(s)?  No     Needs post D/C:   Now that you are home, are there any needs or concerns you need addressed before your next visit with your PCP?  (DME, meds, questions, etc.): No    Interventions by NCM:   Discussed diet, activity, medications and need for f/u visits. Pt did see poditrist, Dr. Orozco today for toenail procedure. Pt confirms 4/12/2024 f/u appt with Dr. Ahumada and heart monitor placement. Offered TCM appt with John Ying, as PCP out of office until 4/08/2024 and has no availability within 2 week TCM timeframe--pt declines--prefers to see PCP, only. Sent TE to office staff for appt assist and f/u.  Patient aware when to contact PCP/specialists and when to seek  emergency care. No further questions/concerns at this time.    Overall Rating:   How would you rate the care you received while in the hospital? excellent    CCM referral placed:    Not Applicable    BOOK BY DATE: 4/11/2024

## 2024-03-29 NOTE — PROGRESS NOTES
Left message on mailbox for pt to call NCM back for TCM.  NCM contact information [phone] included in message.        Discharge Dx:   Acute Brugada Syndrome     Follow up appointments:    Follow-up Information    MCT length of study: MCT 30 days  Testing should be scheduled on or before: 4/8/2024  Follow up With Specialties Details Why Contact Info   Zac Ahumada MD Cardiac Electrophysiology, CARDIOLOGY Follow up in 1 week(s) Office will call you to schedule heart monitor and follow up visit for 1-2 weeks [address]  [address]  [facility] [address]  [phone]     RECS:  -Treat fevers immediately, use antipyretics as needed  -Review website [email or web address].ord for substances/meds to avoid and for further pt education  -See me in office in 1-2 weeks.  We will plan genetic testing for Brugada mutations, which would have implications for screening his 5 children (age 2-20).  We will also place a 30-day monitor in the office that day.     From CV perspective, he can be discharged.     Thank you for the EP consultation.     Zac Ahumada MD  Cardiac EP  [facility]  3/28/2024  C5-EP

## 2024-03-29 NOTE — PROGRESS NOTES
[facility] Podiatry  Progress Note    Robert Subramanian is a 38 year old male.   Chief Complaint   Patient presents with    Ingrown Toenail     Procedure for ingrown nail on left big toe. Pt states pain when touched and rates it a 10. No drainage         HPI:     This is a pleasant male that presents to clinic today due to left great toe ingrown toenail pain.  He would like to proceed with nail surgery today.        Allergies: Patient has no known allergies.   Current Outpatient Medications   Medication Sig Dispense Refill    metoprolol succinate ER 25 MG Oral Tablet 24 Hr Take 1 tablet (25 mg total) by mouth Daily Beta Blocker. 30 tablet 1    benzonatate 200 MG Oral Cap Take 1 capsule (200 mg total) by mouth 3 (three) times daily as needed for cough. 45 capsule 0      Past Medical History:   Diagnosis Date    Hyperlipidemia     Visual impairment     glasses      Past Surgical History:   Procedure Laterality Date    OTHER SURGICAL HISTORY  12/19/2019    excision chest wall cyst X 2       Family History   Problem Relation Age of Onset    Diabetes Father     Diabetes Mother       Social History     Socioeconomic History    Marital status:    Tobacco Use    Smoking status: Former    Smokeless tobacco: Never   Vaping Use    Vaping Use: Never used   Substance and Sexual Activity    Alcohol use: Not Currently    Drug use: Never           REVIEW OF SYSTEMS:   Denies nausea, fever, chills  No calf pain  No other muscle or joint aches  Denies chest pain or shortness of breath.      EXAM:   /90   Pulse 108     Constitutional:   Patient in no apparent distress. Well kept Of normal body habitus. Alert and oriented to person, place, and time.  Integumentary examination:   There are no varicosities.   Skin appears moist, warm, and supple with positive hair growth.     Left hallux lateral nail border is incurvated with no SOI    Vascular examination:   DP pulse is 2/4  PT pulse is 2/4  Capillary refill is  immediate  Edema is not present bilateral.  Temperature warm proximally to warm distally bilateral.  Neurological examination:   Vibratory (128-Hz tuning fork) sensation is present to right and is present to left.  Sharp/dull is present to right and is present to left.  Musculoskeletal examination:  Muscle Strength is 5/5.    POP to left hallux lateral nail border       LABS & IMAGING:     Lab Results   Component Value Date    GLU 96 03/28/2024    BUN 11 03/28/2024    CREATSERUM 1.05 03/28/2024    BUNCREA 10.5 03/28/2024    ANIONGAP 6 03/28/2024    GFRAA 92 01/02/2022    GFRNAA 79 01/02/2022    CA 9.4 03/28/2024     03/28/2024    K 4.0 03/28/2024     03/28/2024    CO2 26.0 03/28/2024    OSMOCALC 283 03/28/2024        Lab Results   Component Value Date     (H) 06/28/2023    A1C 6.2 (H) 06/28/2023        No results found.     ASSESSMENT AND PLAN:   Diagnoses and all orders for this visit:    Ingrown left greater toenail    Great toe pain, left          Plan:     Treatment options reviewed with the patient related to condition/diagnosis.  Discussed advantages and disadvantages as well as risks/potential complications related to nail surgery.    Pt would like to proceed with left hallux lateral nail border matrixectomy    All questions answered, informed consent reviewed and pt agrees to proceed.  PROCEDURE: 24588 Left hallux lateral nail border matrixectomy  Local infiltration was given consisting of 3 cc of a 1 :1 mixture of 0.5% marcaine plain and 1% lidocaine plain  Betadine prep was preformed to the affected toe followed by proper sterile draping.  After confirming full anesthetic effect to the proper site, the nail fold was released from the lateral border of the toe.  An english anvil was used to initiate the linear splitting of the nail border and completed with a #62 blade.  The hemostat was used to completely resect the offending nail border. Curettage was performed to the nail bed and  exposed nail matrix. At that time, 3 - 30 second applications of Phenol was applied to the exposed nail matrix using cotton tip applicators. The site was lightly irrigated and a moist sterile dressing was applied.  Pt tolerated procedure well with no complications encountered.      RTC 4 weeks for left hallux lateral nail border matrixectomy check    No follow-ups on file.    Justin Guerra DPM  3/29/24

## 2024-03-29 NOTE — TELEPHONE ENCOUNTER
Spoke with patient for TCM today.  Patient confirms cardiology f/u with Dr. Ahumada and heart monitor 4/12/2024.     Pt agreeable to TCM appt with , only--declines appt with John Ying. Pt made aware PCP out of office until 4/08/2024--this NCM unable to book, as requested, as PCP has no availability within 2 week TCM timeframe    TCM appointment recommended by 4/11/2024, as patient is a Moderate risk for readmission.  Please advise.    Please discuss with PCP if she can add pt to her schedule by 4/11/2024 and follow-up with patient, accordingly. Thank you!           Follow up With Specialties Details Why Contact Info   Zac Ahumada MD Cardiac Electrophysiology, CARDIOLOGY Follow up in 1 week(s) Office will call you to schedule heart monitor and follow up visit for 1-2 weeks [address]  [address]  [facility] [address]  [phone]

## 2024-03-31 ENCOUNTER — HOSPITAL ENCOUNTER (OUTPATIENT)
Age: 39
Discharge: HOME | End: 2024-03-31
Payer: COMMERCIAL

## 2024-03-31 VITALS
HEART RATE: 90 BPM | OXYGEN SATURATION: 97 % | DIASTOLIC BLOOD PRESSURE: 85 MMHG | TEMPERATURE: 98 F | SYSTOLIC BLOOD PRESSURE: 122 MMHG | RESPIRATION RATE: 18 BRPM

## 2024-03-31 DIAGNOSIS — L03.313 CELLULITIS OF CHEST WALL: ICD-10-CM

## 2024-03-31 DIAGNOSIS — R05.1 ACUTE COUGH: Primary | ICD-10-CM

## 2024-03-31 PROCEDURE — 99213 OFFICE O/P EST LOW 20 MIN: CPT

## 2024-03-31 RX ORDER — CEPHALEXIN 500 MG/1
500 CAPSULE ORAL 3 TIMES DAILY
Qty: 21 CAPSULE | Refills: 0 | Status: SHIPPED | OUTPATIENT
Start: 2024-03-31 | End: 2024-04-07

## 2024-03-31 RX ORDER — ALBUTEROL SULFATE 90 UG/1
2 AEROSOL, METERED RESPIRATORY (INHALATION) EVERY 4 HOURS PRN
Qty: 1 EACH | Refills: 0 | Status: SHIPPED | OUTPATIENT
Start: 2024-03-31 | End: 2024-04-30

## 2024-03-31 NOTE — ED PROVIDER NOTES
Patient Seen in: [facility] [place]    History     Chief Complaint   Patient presents with    Cough/URI     Stated Complaint: Trouble Breathing    HPI    Robert Subramanian is a 38 year old male presents with chief complaint of cough.  Onset 6 days ago.  Patient also reports redness and swelling of chronic keloid of right chest.  Patient denies fever, chills, earache, nasal drainage, sore throat, abdominal pain, nausea, vomiting, diarrhea, constipation, dysuria, hematuria, flank pain, rash, neck pain, neck swelling, restricted neck movement, dyspnea, wheeze, hemoptysis, open wound, purulent drainage.      Past Medical History:   Diagnosis Date    Hyperlipidemia     Visual impairment     glasses       Past Surgical History:   Procedure Laterality Date    OTHER SURGICAL HISTORY  12/19/2019    excision chest wall cyst X 2             Family History   Problem Relation Age of Onset    Diabetes Father     Diabetes Mother        Social History     Socioeconomic History    Marital status:    Tobacco Use    Smoking status: Former    Smokeless tobacco: Never   Vaping Use    Vaping Use: Never used   Substance and Sexual Activity    Alcohol use: Not Currently    Drug use: Never     Social Determinants of Health     Financial Resource Strain: Low Risk  (3/29/2024)    Financial Resource Strain     Difficulty of Paying Living Expenses: Not very hard     Med Affordability: No   Food Insecurity: No Food Insecurity (3/26/2024)    Food Insecurity     Food Insecurity: Never true   Transportation Needs: No Transportation Needs (3/29/2024)    Transportation Needs     Lack of Transportation: No   Housing Stability: Low Risk  (3/26/2024)    Housing Stability     Housing Instability: No       Review of Systems    Positive for stated complaint: Trouble Breathing  Other systems are as noted in HPI.  Constitutional and vital signs reviewed.      All other systems reviewed and negative except as noted above.    PSFH elements  reviewed from today and agreed except as otherwise stated in HPI.    Physical Exam     ED Triage Vitals [03/31/24 1144]   /85   Pulse 90   Resp 18   Temp 98 °F (36.7 °C)   Temp src Temporal   SpO2 97 %   O2 Device None (Room air)       Current:/85   Pulse 90   Temp 98 °F (36.7 °C) (Temporal)   Resp 18   SpO2 97%     PULSE OX within normal limits on room air as interpreted by this provider.     Constitutional: The patient is cooperative. Appears well-developed and well-nourished.  No acute distress.  Psychological: Alert, No abnormalities of mood, affect.  Head: Normocephalic/atraumatic.  Nontender.  Eyes: Pupils are equal round reactive to light.  Conjunctiva are within normal limits.  ENT: Pharynx noninjected.  Tonsils within normal size limits bilaterally.  No tonsillar exudates.  TMs within normal limits bilaterally.  Mucous membranes moist.  Neck: The neck is supple.  No meningeal signs.  Chest: There is no tenderness to the chest wall.  No CVA tenderness bilaterally.  Respiratory: Respiratory effort was normal.  There is no rales, wheezes, or rhonchi.  There is no stridor.  Air entry is equal.  Cardiovascular: Regular rate and rhythm.  Capillary refill is brisk.   Genitourinary: Not examined.  Lymphatic: No gross lymphadenopathy noted.  Musculoskeletal: Musculoskeletal system is grossly intact.  There is no obvious deformity.  Neurological: Gross motor movement is intact in all 4 extremities.  Patient exhibits normal speech.  Skin: Chronic appearing keloid present at right chest.  Positive erythema and mild edema present at superior aspect of keloid.  No open wound or purulent drainage.  No erythematous tracking.  No induration or fluctuance.  No obvious bruising.  No obvious rash.        ED Course   Labs Reviewed - No data to display    MDM     Patient declined chest x-rays.    Physical exam remained stable as previously documented.  Physical exam findings discussed with patient.    I have  given the patient instructions regarding their diagnoses, expectations, follow up, and ER precautions. I explained to the patient that emergent conditions may arise and to go to the ER for new, worsening or any persistent conditions. I've explained the importance of following up with their doctor as instructed. The patient verbalized understanding of the discharge instructions and plan.        Disposition and Plan     Clinical Impression:  1. Acute cough    2. Cellulitis of chest wall        Disposition:  Discharge    Follow-up:  Adriana Peters MD  [address]  [place] [id number] [phone]    Call in 1 day  For follow-up      Medications Prescribed:  Discharge Medication List as of 3/31/2024 11:51 AM        START taking these medications    Details   albuterol 108 (90 Base) MCG/ACT Inhalation Aero Soln Inhale 2 puffs into the lungs every 4 (four) hours as needed for Wheezing., Normal, Disp-1 each, R-0      Spacer/Aero-Holding Chambers Does not apply Device Use with albuterol inhaler, Normal, Disp-1 each, R-0      cephalexin (KEFLEX) 500 MG Oral Cap Take 1 capsule (500 mg total) by mouth 3 (three) times daily for 7 days., Normal, Disp-21 capsule, R-0

## 2024-04-01 NOTE — TELEPHONE ENCOUNTER
Adriana Peters MD at 4/1/2024 11:01 AM    Status: Signed   I can do the 10th at 2.15pm          Left message for patient informing of sooner appointment on 4/10 available at 2:15pm .  Call back to change appointment.

## 2024-04-15 ENCOUNTER — OFFICE VISIT (OUTPATIENT)
Dept: FAMILY MEDICINE CLINIC | Facility: CLINIC | Age: 39
End: 2024-04-15
Payer: COMMERCIAL

## 2024-04-15 VITALS
DIASTOLIC BLOOD PRESSURE: 92 MMHG | BODY MASS INDEX: 44.1 KG/M2 | WEIGHT: 315 LBS | HEIGHT: 71 IN | SYSTOLIC BLOOD PRESSURE: 150 MMHG | RESPIRATION RATE: 19 BRPM | HEART RATE: 89 BPM

## 2024-04-15 DIAGNOSIS — R73.03 PREDIABETES: ICD-10-CM

## 2024-04-15 DIAGNOSIS — I49.8 BRUGADA SYNDROME: Primary | ICD-10-CM

## 2024-04-15 DIAGNOSIS — E78.5 DYSLIPIDEMIA: ICD-10-CM

## 2024-04-15 DIAGNOSIS — I10 ESSENTIAL HYPERTENSION: ICD-10-CM

## 2024-04-15 DIAGNOSIS — G47.9 SLEEP DISORDER: ICD-10-CM

## 2024-04-15 PROCEDURE — 99214 OFFICE O/P EST MOD 30 MIN: CPT | Performed by: FAMILY MEDICINE

## 2024-04-15 NOTE — PROGRESS NOTES
Robert Subramanian is a 38 year old male.  HPI:   Here after hospital admission, he was admitted in 3/26 due to Brugada syndrome, he was discharged on 3/28 and was seen then on 3/31 in urgent care, patient initially was presenting with shortness of breath as well as drainage in chest keloid, EKG changes improved while patient was in telemetry after fever resolved but his underlying shortness of breath which was associated to wheezing persisted as well as the drainage, he received antibiotic coverage as well as albuterol in urgent care and symptoms have not improved, shortness of breath has now normalized and he has clear drainage from keloid with no associated pain or discomfort.  He has not been consistent with the use of metoprolol.  He is concerned of why his blood pressure has been increasing  Current Outpatient Medications   Medication Sig Dispense Refill    albuterol 108 (90 Base) MCG/ACT Inhalation Aero Soln Inhale 2 puffs into the lungs every 4 (four) hours as needed for Wheezing. 1 each 0    Spacer/Aero-Holding Chambers Does not apply Device Use with albuterol inhaler 1 each 0    metoprolol succinate ER 25 MG Oral Tablet 24 Hr Take 1 tablet (25 mg total) by mouth Daily Beta Blocker. (Patient not taking: Reported on 4/15/2024) 30 tablet 1      Past Medical History:    Hyperlipidemia    Visual impairment    glasses      Social History:  Social History     Socioeconomic History    Marital status:    Tobacco Use    Smoking status: Former    Smokeless tobacco: Never   Vaping Use    Vaping status: Never Used   Substance and Sexual Activity    Alcohol use: Not Currently    Drug use: Never     Social Determinants of Health     Financial Resource Strain: Low Risk  (3/29/2024)    Financial Resource Strain     Difficulty of Paying Living Expenses: Not very hard     Med Affordability: No   Food Insecurity: No Food Insecurity (3/26/2024)    Food Insecurity     Food Insecurity: Never true   Transportation  Needs: No Transportation Needs (3/29/2024)    Transportation Needs     Lack of Transportation: No   Housing Stability: Low Risk  (3/26/2024)    Housing Stability     Housing Instability: No          EXAM:   BP (!) 150/92   Pulse 89   Resp 19   Ht 5' 11\" (1.803 m)   Wt (!) 373 lb (169.2 kg)   BMI 52.02 kg/m²     Physical Exam  Vitals and nursing note reviewed.   Constitutional:       General: He is not in acute distress.     Appearance: Normal appearance. He is not ill-appearing.   HENT:      Head: Normocephalic and atraumatic.   Cardiovascular:      Rate and Rhythm: Normal rate and regular rhythm.   Pulmonary:      Effort: Pulmonary effort is normal.      Breath sounds: Normal breath sounds.   Musculoskeletal:      Cervical back: Normal range of motion.   Neurological:      General: No focal deficit present.      Mental Status: He is alert and oriented to person, place, and time. Mental status is at baseline.   Psychiatric:         Mood and Affect: Mood normal.            ASSESSMENT AND PLAN:   1. Brugada syndrome  He is asymptomatic at this time, encouraged follow-up with cardiology as recommended at time of discharge    2. Dyslipidemia  Follow-up blood work ordered  - Lipid Panel [E]; Future    3. Essential hypertension  Recommended to resume metoprolol, suspect that patient may be presenting with associated sleep apnea which can be affecting blood pressure as well, we did discuss importance of managing his condition as well as weight management, he is interested in pursuing option of weight management, instructed to verify with cardiology if use of GLP will be appropriate    4. Sleep disorder    - DIAGOSTIC SLEEP STUDY  - General sleep study; Future  - Hemoglobin A1C; Future  - Comp Metabolic Panel (14) [E]; Future    5. Prediabetes  Follow-up labs ordered       The patient indicates understanding of these issues and agrees to the plan.      The above note was creating using Dragon speech recognition  technology. Please excuse any typos.

## 2024-04-17 ENCOUNTER — PATIENT MESSAGE (OUTPATIENT)
Dept: FAMILY MEDICINE CLINIC | Facility: CLINIC | Age: 39
End: 2024-04-17

## 2024-04-18 ENCOUNTER — OFFICE VISIT (OUTPATIENT)
Dept: FAMILY MEDICINE CLINIC | Facility: CLINIC | Age: 39
End: 2024-04-18
Payer: COMMERCIAL

## 2024-04-18 ENCOUNTER — NURSE TRIAGE (OUTPATIENT)
Dept: FAMILY MEDICINE CLINIC | Facility: CLINIC | Age: 39
End: 2024-04-18

## 2024-04-18 VITALS
SYSTOLIC BLOOD PRESSURE: 138 MMHG | HEIGHT: 71 IN | DIASTOLIC BLOOD PRESSURE: 86 MMHG | WEIGHT: 315 LBS | HEART RATE: 86 BPM | BODY MASS INDEX: 44.1 KG/M2

## 2024-04-18 DIAGNOSIS — L91.0 KELOID: Primary | ICD-10-CM

## 2024-04-18 PROCEDURE — 99213 OFFICE O/P EST LOW 20 MIN: CPT | Performed by: FAMILY MEDICINE

## 2024-04-18 RX ORDER — CEPHALEXIN 500 MG/1
500 TABLET ORAL 4 TIMES DAILY
Qty: 40 TABLET | Refills: 0 | Status: SHIPPED | OUTPATIENT
Start: 2024-04-18

## 2024-04-18 NOTE — TELEPHONE ENCOUNTER
Catarino April, RN 4/18/2024 8:04 AM CDT        ----- Message -----  From: Robert Subramanian  Sent: 4/17/2024 10:22 PM CDT  To: Em Triage Support  Subject: Cameron Pagan the keloid on my chest busted open again and it is leaking puss and blood should I go to the emergency department/urgent care or can you get a prescription for antibiotics

## 2024-04-18 NOTE — PROGRESS NOTES
Blood pressure 138/86, pulse 86, height 5' 11\" (1.803 m), weight (!) 376 lb (170.6 kg).          Yesterday one of his keloids opens.  He has several large keloids 2 on his chest 2 on his face    Objective large keloid right side of chest x 2 openings noted    Assessment keloid rupture    Large keloids    Plan culture swab sent start cephalexin referral to plastic surgery

## 2024-04-18 NOTE — TELEPHONE ENCOUNTER
Please reply to pool: EM RN TRIAGE  Action Requested: Summary for Provider     []  Critical Lab, Recommendations Needed  [] Need Additional Advice  [x]   FYI    []   Need Orders  [] Need Medications Sent to Pharmacy  []  Other     SUMMARY: Patient with history of keloid to right chest now bleeding and draining pus from 2 small areas.  Had keloid removed and it came back larger.  Denies surrounding swelling or redness.  Drainage had him change his shirt twice yesterday.  Denies fever, body aches or chills.  Acute visit booked today.    Reason for call: Wound Care  Onset: Data Unavailable                       Reason for Disposition   Pus or cloudy fluid draining from wound    Protocols used: Wound Infection-A-[place]

## 2024-04-26 ENCOUNTER — OFFICE VISIT (OUTPATIENT)
Dept: PODIATRY CLINIC | Facility: CLINIC | Age: 39
End: 2024-04-26
Payer: COMMERCIAL

## 2024-04-26 DIAGNOSIS — Z98.890 S/P NAIL SURGERY: Primary | ICD-10-CM

## 2024-04-26 PROCEDURE — 99212 OFFICE O/P EST SF 10 MIN: CPT | Performed by: PODIATRIST

## 2024-04-26 NOTE — PROGRESS NOTES
[facility] Podiatry  Progress Note    Robert Subramanian is a 38 year old male.   Chief Complaint   Patient presents with    Post-Op     L hallux ingrown toenail procedure on 3/29/2024- stated no drainage- rates pain 4/10 only when touched         HPI:     This is a pleasant male that presents to clinic today 4 weeks S/P left hallux lateral nail border matrixectomy.  He denies any drainage or pain.        Allergies: Patient has no known allergies.   Current Outpatient Medications   Medication Sig Dispense Refill    Cephalexin 500 MG Oral Tab Take 500 mg by mouth 4 (four) times daily. 40 tablet 0    albuterol 108 (90 Base) MCG/ACT Inhalation Aero Soln Inhale 2 puffs into the lungs every 4 (four) hours as needed for Wheezing. 1 each 0    metoprolol succinate ER 25 MG Oral Tablet 24 Hr Take 1 tablet (25 mg total) by mouth Daily Beta Blocker. 30 tablet 1      Past Medical History:    Hyperlipidemia    Visual impairment    glasses      Past Surgical History:   Procedure Laterality Date    Other surgical history  12/19/2019    excision chest wall cyst X 2       Family History   Problem Relation Age of Onset    Diabetes Father     Diabetes Mother       Social History     Socioeconomic History    Marital status:    Tobacco Use    Smoking status: Former    Smokeless tobacco: Never   Vaping Use    Vaping status: Never Used   Substance and Sexual Activity    Alcohol use: Not Currently    Drug use: Never           REVIEW OF SYSTEMS:   Denies nausea, fever, chills  No calf pain  No other muscle or joint aches  Denies chest pain or shortness of breath.      EXAM:   There were no vitals taken for this visit.    Constitutional:   Patient in no apparent distress. Well kept Of normal body habitus. Alert and oriented to person, place, and time.  Integumentary examination:   There are no varicosities.   Skin appears moist, warm, and supple with positive hair growth.     Left hallux lateral nail fold is healed with no  SOI    Vascular examination:   DP pulse is 2/4  PT pulse is 2/4  Capillary refill is immediate  Edema is not present bilateral.  Temperature warm proximally to warm distally bilateral.  Neurological examination:   Vibratory (128-Hz tuning fork) sensation is present to right and is present to left.  Sharp/dull is present to right and is present to left.  Musculoskeletal examination:  Muscle Strength is 5/5.        LABS & IMAGING:     Lab Results   Component Value Date    GLU 96 03/28/2024    BUN 11 03/28/2024    CREATSERUM 1.05 03/28/2024    BUNCREA 10.5 03/28/2024    ANIONGAP 6 03/28/2024    GFRAA 92 01/02/2022    GFRNAA 79 01/02/2022    CA 9.4 03/28/2024     03/28/2024    K 4.0 03/28/2024     03/28/2024    CO2 26.0 03/28/2024    OSMOCALC 283 03/28/2024        Lab Results   Component Value Date     (H) 06/28/2023    A1C 6.2 (H) 06/28/2023        No results found.     ASSESSMENT AND PLAN:   Diagnoses and all orders for this visit:    S/P nail surgery            Plan:     Evaluated left hallux lateral nail fold which is healed with no SOI    RTC PRN    No follow-ups on file.    Justin Guerra DPM  4/26/24

## 2024-05-09 ENCOUNTER — LAB ENCOUNTER (OUTPATIENT)
Dept: LAB | Age: 39
End: 2024-05-09
Attending: FAMILY MEDICINE
Payer: COMMERCIAL

## 2024-05-09 DIAGNOSIS — E78.5 DYSLIPIDEMIA: ICD-10-CM

## 2024-05-09 DIAGNOSIS — G47.9 SLEEP DISORDER: ICD-10-CM

## 2024-05-09 LAB
ALBUMIN SERPL-MCNC: 4.4 G/DL (ref 3.2–4.8)
ALBUMIN/GLOB SERPL: 1.4 {RATIO} (ref 1–2)
ALP LIVER SERPL-CCNC: 100 U/L
ALT SERPL-CCNC: 23 U/L
ANION GAP SERPL CALC-SCNC: 7 MMOL/L (ref 0–18)
AST SERPL-CCNC: 28 U/L (ref ?–34)
BILIRUB SERPL-MCNC: 0.4 MG/DL (ref 0.3–1.2)
BUN BLD-MCNC: 11 MG/DL (ref 9–23)
BUN/CREAT SERPL: 11.3 (ref 10–20)
CALCIUM BLD-MCNC: 9.2 MG/DL (ref 8.7–10.4)
CHLORIDE SERPL-SCNC: 105 MMOL/L (ref 98–112)
CHOLEST SERPL-MCNC: 199 MG/DL (ref ?–200)
CO2 SERPL-SCNC: 29 MMOL/L (ref 21–32)
CREAT BLD-MCNC: 0.97 MG/DL
EGFRCR SERPLBLD CKD-EPI 2021: 102 ML/MIN/1.73M2 (ref 60–?)
EST. AVERAGE GLUCOSE BLD GHB EST-MCNC: 134 MG/DL (ref 68–126)
FASTING PATIENT LIPID ANSWER: YES
FASTING STATUS PATIENT QL REPORTED: YES
GLOBULIN PLAS-MCNC: 3.1 G/DL (ref 2–3.5)
GLUCOSE BLD-MCNC: 98 MG/DL (ref 70–99)
HBA1C MFR BLD: 6.3 % (ref ?–5.7)
HDLC SERPL-MCNC: 30 MG/DL (ref 40–59)
LDLC SERPL CALC-MCNC: 146 MG/DL (ref ?–100)
NONHDLC SERPL-MCNC: 169 MG/DL (ref ?–130)
OSMOLALITY SERPL CALC.SUM OF ELEC: 291 MOSM/KG (ref 275–295)
POTASSIUM SERPL-SCNC: 3.9 MMOL/L (ref 3.5–5.1)
PROT SERPL-MCNC: 7.5 G/DL (ref 5.7–8.2)
SODIUM SERPL-SCNC: 141 MMOL/L (ref 136–145)
TRIGL SERPL-MCNC: 127 MG/DL (ref 30–149)
VLDLC SERPL CALC-MCNC: 24 MG/DL (ref 0–30)

## 2024-05-09 PROCEDURE — 80061 LIPID PANEL: CPT

## 2024-05-09 PROCEDURE — 36415 COLL VENOUS BLD VENIPUNCTURE: CPT

## 2024-05-09 PROCEDURE — 80053 COMPREHEN METABOLIC PANEL: CPT

## 2024-05-09 PROCEDURE — 83036 HEMOGLOBIN GLYCOSYLATED A1C: CPT

## 2024-05-21 ENCOUNTER — HOSPITAL ENCOUNTER (OUTPATIENT)
Age: 39
Discharge: ANOTHER HOSPITAL | End: 2024-05-21

## 2024-05-21 ENCOUNTER — HOSPITAL ENCOUNTER (EMERGENCY)
Facility: HOSPITAL | Age: 39
Discharge: HOME | End: 2024-05-21
Attending: EMERGENCY MEDICINE

## 2024-05-21 VITALS
DIASTOLIC BLOOD PRESSURE: 88 MMHG | RESPIRATION RATE: 18 BRPM | WEIGHT: 315 LBS | TEMPERATURE: 99 F | BODY MASS INDEX: 44.1 KG/M2 | OXYGEN SATURATION: 96 % | HEART RATE: 84 BPM | SYSTOLIC BLOOD PRESSURE: 142 MMHG | HEIGHT: 71 IN

## 2024-05-21 VITALS
OXYGEN SATURATION: 96 % | TEMPERATURE: 97 F | RESPIRATION RATE: 20 BRPM | DIASTOLIC BLOOD PRESSURE: 102 MMHG | HEART RATE: 88 BPM | SYSTOLIC BLOOD PRESSURE: 150 MMHG

## 2024-05-21 DIAGNOSIS — L02.213 CHEST WALL ABSCESS: Primary | ICD-10-CM

## 2024-05-21 DIAGNOSIS — L91.0 KELOID: Primary | ICD-10-CM

## 2024-05-21 DIAGNOSIS — L03.313 CELLULITIS OF CHEST WALL: ICD-10-CM

## 2024-05-21 PROCEDURE — 10061 I&D ABSCESS COMP/MULTIPLE: CPT

## 2024-05-21 PROCEDURE — 99213 OFFICE O/P EST LOW 20 MIN: CPT

## 2024-05-21 PROCEDURE — 99284 EMERGENCY DEPT VISIT MOD MDM: CPT

## 2024-05-21 PROCEDURE — 99283 EMERGENCY DEPT VISIT LOW MDM: CPT

## 2024-05-21 RX ORDER — METOPROLOL SUCCINATE 25 MG/1
25 TABLET, EXTENDED RELEASE ORAL
Qty: 90 TABLET | Refills: 3 | Status: SHIPPED | OUTPATIENT
Start: 2024-05-21

## 2024-05-21 RX ORDER — DOXYCYCLINE HYCLATE 100 MG/1
100 CAPSULE ORAL 2 TIMES DAILY
Qty: 20 CAPSULE | Refills: 0 | Status: SHIPPED | OUTPATIENT
Start: 2024-05-21 | End: 2024-05-31

## 2024-05-21 RX ORDER — LIDOCAINE HYDROCHLORIDE 10 MG/ML
20 INJECTION, SOLUTION EPIDURAL; INFILTRATION; INTRACAUDAL; PERINEURAL ONCE
Status: COMPLETED | OUTPATIENT
Start: 2024-05-21 | End: 2024-05-21

## 2024-05-21 NOTE — ED PROVIDER NOTES
Patient Seen in: [facility] [place]      History     Chief Complaint   Patient presents with    Rash Skin Problem     Stated Complaint: wound care    Subjective:   HPI    Patient is a 38-year-old male with past medical history of Brugada, keloids, who presents to immediate care due to increasing pain of chest wall keloid over the past 3 days.  Patient notes increased pain swelling redness of his chest wall.  Denies acute injury bleeding discharge.  Was recently on antibiotics 1 month ago, Keflex for similar symptoms.  Has plastics outpatient follow-up in July.  Patient denies fever or chills    Objective:   Past Medical History:    Hyperlipidemia    Visual impairment    glasses              Past Surgical History:   Procedure Laterality Date    Other surgical history  12/19/2019    excision chest wall cyst X 2                 No pertinent social history.            Review of Systems    Positive for stated complaint: wound care  Other systems are as noted in HPI.  Constitutional and vital signs reviewed.      All other systems reviewed and negative except as noted above.    Physical Exam     ED Triage Vitals [05/21/24 1003]   BP (!) 150/102   Pulse 88   Resp 20   Temp 97.4 °F (36.3 °C)   Temp src Temporal   SpO2 96 %   O2 Device None (Room air)       Current Vitals:   Vital Signs  BP: (!) 150/102  Pulse: 88  Resp: 20  Temp: 97.4 °F (36.3 °C)  Temp src: Temporal    Oxygen Therapy  SpO2: 96 %  O2 Device: None (Room air)            Physical Exam    Vital signs reviewed. Nursing note reviewed.  Constitutional: Well-developed. Well-nourished. In no acute distress  HENT: Mucous membranes moist.   EYES: No scleral icterus or conjunctival injection.  NECK: Full ROM. Supple.   CARDIAC: Normal rate. Normal S1/ S2. 2+ distal pulses. No edema  PULM/CHEST: Clear to auscultation bilaterally. No wheezes.  Large keloid for motion on right side chest wall with increased erythema warmth tenderness, fluctuation to the superior  aspect.  Extremities: Full ROM  NEURO: Awake, alert, following commands, moving extremities, answering questions.   SKIN: Warm and dry. No rash or lesions.  PSYCH: Normal judgment. Normal affect.               MDM      Patient is a 38-year-old male with past medical history of Brugada syndrome, keloids that presents to immediate care due to increased pain of chest wall x 3 days.  Patient arrives hypertensive nontoxic.  Physical exam showing large keloid of right chest wall with increased erythema warmth tenderness fluctuation to the superior aspect of keloid formation.  Differential diagnosis includes cellulitis, deep space infection, abscess.  Encourage patient due to history of recurrent keloid formation with complication to proceed to emergency department for further evaluation and management.  History given by patient.  Care discussed with Dr. Cruz.  Patient states he will go to [place] emergency department at this time.                                   Medical Decision Making      Disposition and Plan     Clinical Impression:  1. Keloid    2. Cellulitis of chest wall         Disposition:  Ic to ed  5/21/2024 10:17 am    Follow-up:  No follow-up provider specified.        Medications Prescribed:  Discharge Medication List as of 5/21/2024 10:16 AM

## 2024-05-21 NOTE — TELEPHONE ENCOUNTER
REFILL PASSED PER [facility] PROTOCOLS    Requested Prescriptions   Pending Prescriptions Disp Refills    metoprolol succinate ER 25 MG Oral Tablet 24 Hr 30 tablet 1     Sig: Take 1 tablet (25 mg total) by mouth Daily Beta Blocker.       Hypertension Medications Protocol Passed - 5/18/2024  3:40 AM        Passed - CMP or BMP in past 12 months        Passed - Last BP reading less than 140/90     BP Readings from Last 1 Encounters:   05/21/24 (!) 150/102               Passed - In person appointment or virtual visit in the past 12 mos or appointment in next 3 mos     Recent Outpatient Visits              3 weeks ago S/P [place]    [facility] [place] Ruel[place], Justin David, DPM    Office Visit    1 month ago Keloid    [facility] [place]    Office Visit    1 month ago Brugada syndrome    [facility] [address], [place] Adriana Peters MD    Office Visit    1 month ago Ingrown left greater toenail    [facility] [place] Meshulam, Justin David, DPM    Office Visit    2 months ago Ingrown left greater toenail    [facility], [place] Surajulam, Justin David, DPM    Office Visit          Future Appointments         Provider Department Appt Notes    In 4 days [facility] approved until 10/25/24                    Passed - EGFRCR or GFRAA > 50     GFR Evaluation  EGFRCR: 102 , resulted on 5/9/2024               Future Appointments         Provider Department Appt Notes    In 4 days [facility] approved until 10/25/24          Recent Outpatient Visits              3 weeks ago S/P nail surgery    [facility] [place] Meshulam, Justin David, DPM    Office Visit    [facility] [place]     Office Visit    1 month ago Brugada syndrome    [facility], [address], [place] Adriana Peters MD    Office Visit    1 month ago Ingrown left greater toenail    [facility] [place] Justin Guerra DPM    Office Visit    2 months ago Ingrown left greater toenail    [facility] [place]    Office Visit

## 2024-05-21 NOTE — ED INITIAL ASSESSMENT (HPI)
Patient arrived ambulatory to ED, A/O x 4, with complaints of chest wound with discharge.    Patient states that he went to immediate care to have keloid drained, was sent to ED for treatment, en route to ED, keloid ruptured, not experiencing drainage and bleeding. Patient states area is tender, minimal pain.   
no

## 2024-05-21 NOTE — ED INITIAL ASSESSMENT (HPI)
Patient reports tenderness, redness and discharge from a keloid on his right chest for the last few days.  Denies fevers, chills.

## 2024-05-22 NOTE — ED PROVIDER NOTES
Patient Seen in: [facility] Emergency Department    History     Chief Complaint   Patient presents with    Wound Care       HPI    Patient presents to the ED complaining of an abscess to his keloid of his right chest.  He states that he has had abscesses in this area before and requested to be drained.  Denies fevers chills or other complaints.    History reviewed.   Past Medical History:    Hyperlipidemia    Visual impairment    glasses       History reviewed.   Past Surgical History:   Procedure Laterality Date    Other surgical history  12/19/2019    excision chest wall cyst X 2          Medications :  (Not in a hospital admission)       Family History   Problem Relation Age of Onset    Diabetes Father     Diabetes Mother        Smoking Status:   Social History     Socioeconomic History    Marital status:    Tobacco Use    Smoking status: Former    Smokeless tobacco: Never   Vaping Use    Vaping status: Never Used   Substance and Sexual Activity    Alcohol use: Not Currently    Drug use: Never       Constitutional and vital signs reviewed.      Social History and Family History elements reviewed from today, pertinent positives to the presenting problem noted.    Physical Exam     ED Triage Vitals [05/21/24 1217]   BP (!) 150/108   Pulse 86   Resp 20   Temp 98.8 °F (37.1 °C)   Temp src Temporal   SpO2 94 %   O2 Device None (Room air)       All measures to prevent infection transmission during my interaction with the patient were taken. Handwashing was performed prior to and after the exam.  Stethoscope and any equipment used during my examination was cleaned with super sani-cloth germicidal wipes following the exam.     Physical Exam  Constitutional:       Appearance: Normal appearance.   Pulmonary:      Effort: Pulmonary effort is normal. No respiratory distress.   Skin:     General: Skin is warm and dry.      Comments: Patient has several large keloids to his body.  There is a large keloid of the  right chest measuring 5 x 10 cm.  In the central superior aspect of his keloid is a fluctuant area measuring 2 to 2 cm with surrounding erythema and tenderness.   Neurological:      Mental Status: He is alert. Mental status is at baseline.   Psychiatric:         Mood and Affect: Mood normal.         Behavior: Behavior normal.         ED Course      Labs Reviewed - No data to display    As Interpreted by me    Imaging Results Available and Reviewed while in ED: No results found.  ED Medications Administered:   Medications   lidocaine PF (Xylocaine-MPF) 1% injection (20 mL Infiltration Given by Other 5/21/24 1340)         MDM     Vitals:    05/21/24 1217 05/21/24 1343   BP: (!) 150/108 142/88   Pulse: 86 84   Resp: 20 18   Temp: 98.8 °F (37.1 °C)    TempSrc: Temporal    SpO2: 94% 96%   Weight: (!) 165.6 kg    Height: 180.3 cm (5' 11\")      *I personally reviewed and interpreted all ED vitals.    Pulse Ox: 96%, Room air, Normal     Differential Diagnosis/ Diagnostic Considerations: Abscess to right chest wall    Complicating Factors: The patient already has does not have any pertinent problems on file. to contribute to the complexity of this ED evaluation.    Medical Decision Making  Presents to the ED with an abscess of his right chest wall present in a keloid in this area.  This was incised and drained by myself and patient stable for discharge with oral antibiotics and wound care instructions.    Procedure:  Complex abscess drainage:  The patient's abscess was located to the right chest wall and a cuboid.   I obtained verbal consent from the patient to drain the abscess. Patient was informed about the possibility of bleeding, pain and worsening of the condition.  The abscess was incised with a scalpel and using sterile forceps, the wound was probed to break up loculations. A moderate amount of purulent drainage was expressed.   I irrigated and dressed the wound.  The patient tolerated the procedure well.  The  procedure was performed by myself.      Problems Addressed:  Chest wall abscess: acute illness or injury    Risk  Prescription drug management.  Minor surgery with identified risk factors.        Condition upon leaving the department: Stable    Disposition and Plan     Clinical Impression:  1. Chest wall abscess        Disposition:  Discharge    Follow-up:  Adriana Peters MD  [address]  [place] [address]  [phone]    Schedule an appointment as soon as possible for a visit in 1 week(s)        Medications Prescribed:  Discharge Medication List as of 5/21/2024  1:55 PM        START taking these medications    Details   doxycycline 100 MG Oral Cap Take 1 capsule (100 mg total) by mouth 2 (two) times daily for 10 days., Normal, Disp-20 capsule, R-0

## 2024-05-25 ENCOUNTER — OFFICE VISIT (OUTPATIENT)
Dept: SLEEP CENTER | Age: 39
End: 2024-05-25
Attending: FAMILY MEDICINE

## 2024-05-25 DIAGNOSIS — G47.9 SLEEP DISORDER: ICD-10-CM

## 2024-05-25 DIAGNOSIS — R29.818 SUSPECTED SLEEP APNEA: Primary | ICD-10-CM

## 2024-05-25 PROCEDURE — 95811 POLYSOM 6/>YRS CPAP 4/> PARM: CPT

## 2024-05-28 ENCOUNTER — PATIENT MESSAGE (OUTPATIENT)
Dept: FAMILY MEDICINE CLINIC | Facility: CLINIC | Age: 39
End: 2024-05-28

## 2024-05-28 DIAGNOSIS — G47.33 SEVERE OBSTRUCTIVE SLEEP APNEA: Primary | ICD-10-CM

## 2024-06-12 ENCOUNTER — OFFICE VISIT (OUTPATIENT)
Dept: FAMILY MEDICINE CLINIC | Facility: CLINIC | Age: 39
End: 2024-06-12
Payer: COMMERCIAL

## 2024-06-12 ENCOUNTER — TELEPHONE (OUTPATIENT)
Dept: FAMILY MEDICINE CLINIC | Facility: CLINIC | Age: 39
End: 2024-06-12

## 2024-06-12 VITALS
HEIGHT: 71 IN | RESPIRATION RATE: 19 BRPM | DIASTOLIC BLOOD PRESSURE: 92 MMHG | WEIGHT: 315 LBS | BODY MASS INDEX: 44.1 KG/M2 | HEART RATE: 74 BPM | SYSTOLIC BLOOD PRESSURE: 146 MMHG

## 2024-06-12 DIAGNOSIS — R73.03 PREDIABETES: ICD-10-CM

## 2024-06-12 DIAGNOSIS — I10 ESSENTIAL HYPERTENSION: Primary | ICD-10-CM

## 2024-06-12 DIAGNOSIS — E66.01 CLASS 3 SEVERE OBESITY DUE TO EXCESS CALORIES WITH SERIOUS COMORBIDITY AND BODY MASS INDEX (BMI) OF 45.0 TO 49.9 IN ADULT (HCC): ICD-10-CM

## 2024-06-12 DIAGNOSIS — G47.33 SEVERE OBSTRUCTIVE SLEEP APNEA: ICD-10-CM

## 2024-06-12 PROCEDURE — 99214 OFFICE O/P EST MOD 30 MIN: CPT | Performed by: FAMILY MEDICINE

## 2024-06-12 RX ORDER — METOPROLOL SUCCINATE 25 MG/1
25 TABLET, EXTENDED RELEASE ORAL
Qty: 90 TABLET | Refills: 3 | Status: SHIPPED | OUTPATIENT
Start: 2024-06-12

## 2024-06-12 NOTE — TELEPHONE ENCOUNTER
Will the requested drug be used with a reduced calorie diet and increased physical activity to reduce excess body weight or maintain weight reduction long term?  YES or NO

## 2024-06-12 NOTE — PROGRESS NOTES
Robert Subramanian is a 38 year old male.  HPI:   Patient here for follow up, has been off metoprolol for about 6 days, he is asymptomatic. He would like to discuss options regarding weight management  Current Outpatient Medications   Medication Sig Dispense Refill    metoprolol succinate ER 25 MG Oral Tablet 24 Hr Take 1 tablet (25 mg total) by mouth Daily Beta Blocker. 90 tablet 3    semaglutide-weight management 0.25 MG/0.5ML Subcutaneous Solution Auto-injector Inject 0.5 mL (0.25 mg total) into the skin once a week for 4 doses. 2 mL 0    semaglutide-weight management 0.5 MG/0.5ML Subcutaneous Solution Auto-injector Inject 0.5 mL (0.5 mg total) into the skin once a week for 4 doses. To titrate up after 4 weeks on 0.25 mg 2 mL 0      Past Medical History:    Hyperlipidemia    Visual impairment    glasses      Social History:  Social History     Socioeconomic History    Marital status:    Tobacco Use    Smoking status: Former    Smokeless tobacco: Never   Vaping Use    Vaping status: Never Used   Substance and Sexual Activity    Alcohol use: Not Currently    Drug use: Never     Social Determinants of Health     Financial Resource Strain: Low Risk  (3/29/2024)    Financial Resource Strain     Difficulty of Paying Living Expenses: Not very hard     Med Affordability: No   Food Insecurity: No Food Insecurity (3/26/2024)    Food Insecurity     Food Insecurity: Never true   Transportation Needs: No Transportation Needs (3/29/2024)    Transportation Needs     Lack of Transportation: No   Housing Stability: Low Risk  (3/26/2024)    Housing Stability     Housing Instability: No          EXAM:   BP (!) 146/92   Pulse 74   Resp 19   Ht 5' 11\" (1.803 m)   Wt (!) 371 lb 14.4 oz (168.7 kg)   BMI 51.87 kg/m²     Physical Exam  Vitals and nursing note reviewed.   Constitutional:       General: He is not in acute distress.  Pulmonary:      Effort: Pulmonary effort is normal.   Neurological:      Mental Status: He  is alert and oriented to person, place, and time.   Psychiatric:         Mood and Affect: Mood normal.         Behavior: Behavior normal.            ASSESSMENT AND PLAN:     Patient has been off blood pressure medication with elevation in readings, reinforced medication compliance, we discussed the findings of his sleep apnea and the importance of starting CPAP as well as to make follow-up appointment with pulmonology for management of device, explained to patient how this would impact his blood pressure as well as weight, we discussed medication options for weight management could help regarding blood pressure and sleep apnea as noted above, patient preferred to try injectables, prescription sent, recommended to follow-up with Dr. Ocampo to start ongoing weight management care    1. Essential hypertension    - metoprolol succinate ER 25 MG Oral Tablet 24 Hr; Take 1 tablet (25 mg total) by mouth Daily Beta Blocker.  Dispense: 90 tablet; Refill: 3  - semaglutide-weight management 0.25 MG/0.5ML Subcutaneous Solution Auto-injector; Inject 0.5 mL (0.25 mg total) into the skin once a week for 4 doses.  Dispense: 2 mL; Refill: 0  - semaglutide-weight management 0.5 MG/0.5ML Subcutaneous Solution Auto-injector; Inject 0.5 mL (0.5 mg total) into the skin once a week for 4 doses. To titrate up after 4 weeks on 0.25 mg  Dispense: 2 mL; Refill: 0    2. Severe obstructive sleep apnea    - semaglutide-weight management 0.25 MG/0.5ML Subcutaneous Solution Auto-injector; Inject 0.5 mL (0.25 mg total) into the skin once a week for 4 doses.  Dispense: 2 mL; Refill: 0  - semaglutide-weight management 0.5 MG/0.5ML Subcutaneous Solution Auto-injector; Inject 0.5 mL (0.5 mg total) into the skin once a week for 4 doses. To titrate up after 4 weeks on 0.25 mg  Dispense: 2 mL; Refill: 0    3. Prediabetes    - semaglutide-weight management 0.25 MG/0.5ML Subcutaneous Solution Auto-injector; Inject 0.5 mL (0.25 mg total) into the skin once a  week for 4 doses.  Dispense: 2 mL; Refill: 0  - semaglutide-weight management 0.5 MG/0.5ML Subcutaneous Solution Auto-injector; Inject 0.5 mL (0.5 mg total) into the skin once a week for 4 doses. To titrate up after 4 weeks on 0.25 mg  Dispense: 2 mL; Refill: 0    4. Class 3 severe obesity due to excess calories with serious comorbidity and body mass index (BMI) of 45.0 to 49.9 in adult (HCC)    - semaglutide-weight management 0.25 MG/0.5ML Subcutaneous Solution Auto-injector; Inject 0.5 mL (0.25 mg total) into the skin once a week for 4 doses.  Dispense: 2 mL; Refill: 0  - semaglutide-weight management 0.5 MG/0.5ML Subcutaneous Solution Auto-injector; Inject 0.5 mL (0.5 mg total) into the skin once a week for 4 doses. To titrate up after 4 weeks on 0.25 mg  Dispense: 2 mL; Refill: 0       The patient indicates understanding of these issues and agrees to the plan.      The above note was creating using Dragon speech recognition technology. Please excuse any typos.

## 2024-06-12 NOTE — TELEPHONE ENCOUNTER
Denied    Note from payer: Your PA request has been denied.  Additional information will be provided in the denial communication. (Message 1147)  Payer: [facility] [place] Case ID: [id number]    [phone]  Electronic appeal: Not supported  Appeal instructions: Your PA request has been denied.  Additional information will be provided in the denial communication. (Message 114)  View History

## 2024-06-12 NOTE — TELEPHONE ENCOUNTER
Denied    Note from payer: Your PA request has been denied.  Additional information will be provided in the denial communication. (Message 1147)  Payer: [facility] [place] Case ID: [id number]    [phone]  Electronic appeal: Not supported  Appeal instructions: Your PA request has been denied.  Additional information will be provided in the denial communication. (Message 1141)  View History

## 2024-06-13 NOTE — TELEPHONE ENCOUNTER
Close reason: Other  [facility]: [id number]    [phone]    [phone]  Note from payer: Your PA request has been closed. Your request has been sent to senior team for review. Follow up will be sent to your office via fax.  - TM,  06/13/2024 11:14 AM  View History

## 2024-06-13 NOTE — TELEPHONE ENCOUNTER
Please submit again, per denial they did not receive records and do not have information about his BMI

## 2024-07-04 NOTE — PATIENT INSTRUCTIONS
Discharge Plan A and Plan B have been determined by review of patient's clinical status, future medical and therapeutic needs, and coverage/benefits for post-acute care in coordination with multidisciplinary team members.    07/04/24 1354   Post-Acute Status   Post-Acute Authorization Placement   Post-Acute Placement Status Set-up Complete/Auth obtained   Coverage MEDICARE - MEDICARE PART A & B   Discharge Delays None known at this time   Discharge Plan   Discharge Plan A Skilled Nursing Facility  ([facility]  [phone])     M spoke with Dr Charles re: if [facility] has a memory unit. The patients son is concerned about the patient leaving the facility.    1:52 pm  CM called [facility] # [phone] and spoke with Gabriella and the facility does have memory unit to accommodate the patient       Laurie Dela Cruz RN  Case Management  [facility]  [phone]     Omeprazole 20 mg p.o. daily x 1 week

## 2024-07-15 ENCOUNTER — OFFICE VISIT (OUTPATIENT)
Dept: INTERNAL MEDICINE CLINIC | Facility: CLINIC | Age: 39
End: 2024-07-15
Payer: COMMERCIAL

## 2024-07-15 VITALS
WEIGHT: 315 LBS | SYSTOLIC BLOOD PRESSURE: 139 MMHG | RESPIRATION RATE: 16 BRPM | HEIGHT: 71 IN | HEART RATE: 92 BPM | DIASTOLIC BLOOD PRESSURE: 100 MMHG | BODY MASS INDEX: 44.1 KG/M2

## 2024-07-15 DIAGNOSIS — E66.01 CLASS 3 SEVERE OBESITY DUE TO EXCESS CALORIES WITHOUT SERIOUS COMORBIDITY WITH BODY MASS INDEX (BMI) OF 50.0 TO 59.9 IN ADULT (HCC): Primary | ICD-10-CM

## 2024-07-15 RX ORDER — SEMAGLUTIDE 0.5 MG/.5ML
0.5 INJECTION, SOLUTION SUBCUTANEOUS
COMMUNITY
Start: 2024-07-08

## 2024-07-15 NOTE — PROGRESS NOTES
Robert Subramanian is a 38 year old male.  Chief Complaint   Patient presents with    Weight Management     HPI:    Patient presented today for establishment of care. He was seeing Dr. Pagan for weight loss as well. He states that he took first four doses of 0.25 and will be increasing from this Wednesday to 0.5 mg. No nausea, vomiting or diarrhea. Works at night and feels like the night time snacking has improved. Still working on his sleep habits, which is hard because of work schedule.   Keloid was bleeding in the office, but improved with pressure application. He has an appointment coming up with plastics for this. Does not look infected.  Goes to Gym 3 times a week and does both cardio and strength training.       Current Outpatient Medications   Medication Sig Dispense Refill    WEGOVY 0.5 MG/0.5ML Subcutaneous Solution Auto-injector Inject 0.5 mL (0.5 mg total) into the skin every 7 days.      metoprolol succinate ER 25 MG Oral Tablet 24 Hr Take 1 tablet (25 mg total) by mouth Daily Beta Blocker. 90 tablet 3      Past Medical History:    Hyperlipidemia    Visual impairment    glasses      Past Surgical History:   Procedure Laterality Date    Other surgical history  12/19/2019    excision chest wall cyst X 2       Social History:  Social History     Socioeconomic History    Marital status:    Tobacco Use    Smoking status: Former    Smokeless tobacco: Never   Vaping Use    Vaping status: Never Used   Substance and Sexual Activity    Alcohol use: Not Currently    Drug use: Never     Social Determinants of Health     Financial Resource Strain: Low Risk  (3/29/2024)    Financial Resource Strain     Difficulty of Paying Living Expenses: Not very hard     Med Affordability: No   Food Insecurity: No Food Insecurity (3/26/2024)    Food Insecurity     Food Insecurity: Never true   Transportation Needs: No Transportation Needs (3/29/2024)    Transportation Needs     Lack of Transportation: No   Housing  Stability: Low Risk  (3/26/2024)    Housing Stability     Housing Instability: No      Family History   Problem Relation Age of Onset    Diabetes Father     Diabetes Mother       No Known Allergies     REVIEW OF SYSTEMS:   Review of Systems   Review of Systems   Constitutional: Negative for activity change, appetite change and fever.   HENT: Negative for congestion and voice change.    Respiratory: Negative for cough and shortness of breath.    Cardiovascular: Negative for chest pain.   Gastrointestinal: Negative for abdominal distention, abdominal pain and vomiting.   Genitourinary: Negative for hematuria.   Skin: Negative for wound.   Psychiatric/Behavioral: Negative for behavioral problems.   Wt Readings from Last 5 Encounters:   07/15/24 (!) 371 lb 8 oz (168.5 kg)   06/12/24 (!) 371 lb 14.4 oz (168.7 kg)   05/21/24 (!) 365 lb (165.6 kg)   04/18/24 (!) 376 lb (170.6 kg)   04/15/24 (!) 373 lb (169.2 kg)     Body mass index is 51.81 kg/m².      EXAM:   BP (!) 139/100 (BP Location: Left arm, Patient Position: Sitting, Cuff Size: large)   Pulse 92   Resp 16   Ht 5' 11\" (1.803 m)   Wt (!) 371 lb 8 oz (168.5 kg)   BMI 51.81 kg/m²   Physical Exam   Constitutional:       Appearance: Normal appearance.   HENT:      Head: Normocephalic.   Eyes:      Conjunctiva/sclera: Conjunctivae normal.   Cardiovascular:      Rate and Rhythm: Normal rate and regular rhythm.      Heart sounds: Normal heart sounds. No murmur heard.  Pulmonary:      Effort: Pulmonary effort is normal.      Breath sounds: Normal breath sounds. No rhonchi or rales.   Abdominal:      General: Bowel sounds are normal.      Palpations: Abdomen is soft.      Tenderness: There is no abdominal tenderness.   Musculoskeletal:      Cervical back: Neck supple.      Right lower leg: No edema.      Left lower leg: No edema.   Skin:     General: Skin is warm and dry.   Neurological:      General: No focal deficit present.      Mental Status: He is alert and oriented  to person, place, and time. Mental status is at baseline.   Psychiatric:         Mood and Affect: Mood normal.         Behavior: Behavior normal.       ASSESSMENT AND PLAN:   1. Class 3 severe obesity due to excess calories without serious comorbidity with body mass index (BMI) of 50.0 to 59.9 in adult (HCC)  - increase wegovy to 0.5 mg weekly  - side effects reviewed  - increase hydration   - diet and exercise counseling  The patient indicates understanding of these issues and agrees to the plan.  Close follow up up in 1 month    Ale Ocampo MD

## 2024-08-12 ENCOUNTER — OFFICE VISIT (OUTPATIENT)
Dept: INTERNAL MEDICINE CLINIC | Facility: CLINIC | Age: 39
End: 2024-08-12
Payer: COMMERCIAL

## 2024-08-12 VITALS
SYSTOLIC BLOOD PRESSURE: 121 MMHG | DIASTOLIC BLOOD PRESSURE: 84 MMHG | HEART RATE: 84 BPM | RESPIRATION RATE: 18 BRPM | HEIGHT: 71 IN | WEIGHT: 315 LBS | BODY MASS INDEX: 44.1 KG/M2

## 2024-08-12 DIAGNOSIS — R00.0 TACHYCARDIA: ICD-10-CM

## 2024-08-12 DIAGNOSIS — E66.01 CLASS 3 SEVERE OBESITY DUE TO EXCESS CALORIES WITHOUT SERIOUS COMORBIDITY WITH BODY MASS INDEX (BMI) OF 50.0 TO 59.9 IN ADULT (HCC): Primary | ICD-10-CM

## 2024-08-12 PROCEDURE — 99213 OFFICE O/P EST LOW 20 MIN: CPT | Performed by: INTERNAL MEDICINE

## 2024-08-12 NOTE — PROGRESS NOTES
Robert Subramanian is a 38 year old male.  Chief Complaint   Patient presents with    Weight Management     HPI:    Patient presented today for follow up for weight loss visit. He states that he has been tolerating wegovy, no side effects. No nausea, vomiting or diarrhea.     Diet  Low carb diet   Eating more protein  Mostly eats home made food  More protein    Physical activity  Daily free weights  Will be going back to the gym more once 5 daughters back to school      Current Outpatient Medications   Medication Sig Dispense Refill    semaglutide-weight management 1 MG/0.5ML Subcutaneous Solution Auto-injector Inject 0.5 mL (1 mg total) into the skin once a week for 4 doses. 2 mL 0    metoprolol succinate ER 25 MG Oral Tablet 24 Hr Take 1 tablet (25 mg total) by mouth Daily Beta Blocker. 90 tablet 3      Past Medical History:    Hyperlipidemia    Visual impairment    glasses      Past Surgical History:   Procedure Laterality Date    Other surgical history  12/19/2019    excision chest wall cyst X 2       Social History:  Social History     Socioeconomic History    Marital status:    Tobacco Use    Smoking status: Former    Smokeless tobacco: Never   Vaping Use    Vaping status: Never Used   Substance and Sexual Activity    Alcohol use: Not Currently    Drug use: Never     Social Determinants of Health     Financial Resource Strain: Low Risk  (3/29/2024)    Financial Resource Strain     Difficulty of Paying Living Expenses: Not very hard     Med Affordability: No   Food Insecurity: No Food Insecurity (3/26/2024)    Food Insecurity     Food Insecurity: Never true   Transportation Needs: No Transportation Needs (3/29/2024)    Transportation Needs     Lack of Transportation: No   Housing Stability: Low Risk  (3/26/2024)    Housing Stability     Housing Instability: No      Family History   Problem Relation Age of Onset    Diabetes Father     Diabetes Mother       No Known Allergies     REVIEW OF SYSTEMS:    Review of Systems   Review of Systems   Constitutional: Negative for activity change, appetite change and fever.   HENT: Negative for congestion and voice change.    Respiratory: Negative for cough and shortness of breath.    Cardiovascular: Negative for chest pain.   Gastrointestinal: Negative for abdominal distention, abdominal pain and vomiting.   Genitourinary: Negative for hematuria.   Skin: Negative for wound.   Psychiatric/Behavioral: Negative for behavioral problems.   Wt Readings from Last 5 Encounters:   08/12/24 (!) 369 lb (167.4 kg)   07/15/24 (!) 371 lb 8 oz (168.5 kg)   06/12/24 (!) 371 lb 14.4 oz (168.7 kg)   05/21/24 (!) 365 lb (165.6 kg)   04/18/24 (!) 376 lb (170.6 kg)     Body mass index is 51.47 kg/m².      EXAM:   /84 (BP Location: Left arm, Patient Position: Sitting, Cuff Size: large)   Pulse 84   Resp 18   Ht 5' 11\" (1.803 m)   Wt (!) 369 lb (167.4 kg)   BMI 51.47 kg/m²   Physical Exam   Constitutional:       Appearance: Normal appearance.   HENT:      Head: Normocephalic.   Eyes:      Conjunctiva/sclera: Conjunctivae normal.   Cardiovascular:      Rate and Rhythm: Normal rate and regular rhythm.      Heart sounds: Normal heart sounds. No murmur heard.  Pulmonary:      Effort: Pulmonary effort is normal.      Breath sounds: Normal breath sounds. No rhonchi or rales.   Abdominal:      General: Bowel sounds are normal.      Palpations: Abdomen is soft.      Tenderness: There is no abdominal tenderness.   Musculoskeletal:      Cervical back: Neck supple.      Right lower leg: No edema.      Left lower leg: No edema.   Skin:     General: Skin is warm and dry.   Neurological:      General: No focal deficit present.      Mental Status: He is alert and oriented to person, place, and time. Mental status is at baseline.   Psychiatric:         Mood and Affect: Mood normal.         Behavior: Behavior normal.       ASSESSMENT AND PLAN:   1. Class 3 severe obesity due to excess calories  without serious comorbidity with body mass index (BMI) of 50.0 to 59.9 in adult (HCC)  - detailed discussion regarding diet and exercise  - increase strength training activity  - increase hydration   - add more fresh fruit and vegetables to your diet  - increase wegovy to 1 mg sub q weekly for four weeks  - patient to call once done with 1 mg for increase to 2.4 mg  - follow up in 2 months    2. Tachycardia  - well controlled  - continue metoprolol      The patient indicates understanding of these issues and agrees to the plan.  Follow up in 2 months    Ale Ocampo MD

## 2024-08-13 ENCOUNTER — HOSPITAL ENCOUNTER (OUTPATIENT)
Age: 39
Discharge: HOME | End: 2024-08-13
Attending: EMERGENCY MEDICINE
Payer: COMMERCIAL

## 2024-08-13 ENCOUNTER — APPOINTMENT (OUTPATIENT)
Dept: GENERAL RADIOLOGY | Age: 39
End: 2024-08-13
Attending: PHYSICIAN ASSISTANT
Payer: COMMERCIAL

## 2024-08-13 VITALS
TEMPERATURE: 98 F | SYSTOLIC BLOOD PRESSURE: 139 MMHG | HEART RATE: 92 BPM | OXYGEN SATURATION: 96 % | RESPIRATION RATE: 18 BRPM | DIASTOLIC BLOOD PRESSURE: 84 MMHG

## 2024-08-13 DIAGNOSIS — S80.02XA CONTUSION OF LEFT KNEE, INITIAL ENCOUNTER: Primary | ICD-10-CM

## 2024-08-13 PROCEDURE — 73560 X-RAY EXAM OF KNEE 1 OR 2: CPT | Performed by: PHYSICIAN ASSISTANT

## 2024-08-13 PROCEDURE — 99214 OFFICE O/P EST MOD 30 MIN: CPT

## 2024-08-13 RX ORDER — IBUPROFEN 600 MG/1
600 TABLET ORAL ONCE
Status: COMPLETED | OUTPATIENT
Start: 2024-08-13 | End: 2024-08-13

## 2024-08-13 NOTE — ED INITIAL ASSESSMENT (HPI)
Left knee pain s/p fall yesterday, states he tripped on a toy going down 2 steps and landed on left knee, minimal swelling, cms intact

## 2024-08-13 NOTE — ED PROVIDER NOTES
Patient Seen in: [facility] [place]      History     Chief Complaint   Patient presents with    Leg or Foot Injury     Stated Complaint: Fall down stairs    Subjective:   HPI    The patient is a 38-year-old male with past history of hyperlipidemia who presents now with left knee pain.  Patient states he stepped on a toy while coming down the stairs yesterday.  The patient states the toy caused his left foot to slip and the patient relates falling on his left knee.  The patient states the left knee was flexed at the time of the injury.  Patient presents now with persistent pain.  Patient denies any additional injury.    Objective:   Past Medical History:    Hyperlipidemia    Visual impairment    glasses              Past Surgical History:   Procedure Laterality Date    Other surgical history  12/19/2019    excision chest wall cyst X 2                 Social History     Socioeconomic History    Marital status:    Tobacco Use    Smoking status: Former    Smokeless tobacco: Never   Vaping Use    Vaping status: Never Used   Substance and Sexual Activity    Alcohol use: Not Currently    Drug use: Never     Social Determinants of Health     Financial Resource Strain: Low Risk  (3/29/2024)    Financial Resource Strain     Difficulty of Paying Living Expenses: Not very hard     Med Affordability: No   Food Insecurity: No Food Insecurity (3/26/2024)    Food Insecurity     Food Insecurity: Never true   Transportation Needs: No Transportation Needs (3/29/2024)    Transportation Needs     Lack of Transportation: No   Housing Stability: Low Risk  (3/26/2024)    Housing Stability     Housing Instability: No              Review of Systems    Positive for stated Chief Complaint: Leg or Foot Injury    Other systems are as noted in HPI.  Constitutional and vital signs reviewed.      All other systems reviewed and negative except as noted above.    Physical Exam     ED Triage Vitals [08/13/24 1143]   /84   Pulse 92    Resp 18   Temp 97.5 °F (36.4 °C)   Temp src Temporal   SpO2 96 %   O2 Device None (Room air)       Current Vitals:   Vital Signs  BP: 139/84  Pulse: 92  Resp: 18  Temp: 97.5 °F (36.4 °C)  Temp src: Temporal    Oxygen Therapy  SpO2: 96 %  O2 Device: None (Room air)            Physical Exam    Constitutional: Well-developed well-nourished in no acute distress  Head: Normocephalic, no swelling or tenderness  Eyes: Nonicteric sclera, no conjunctival injection  Vascular: Palpable left posterior tibial pulse  Neurologic: Patient is awake, alert and oriented ×3.  The patient's motor strength is 5 out of 5 and symmetric in the upper and lower extremities bilaterally  Extremities: There is mild swelling and tenderness to the left patella area.  There is no medial knee tenderness.  Flexion and extension are intact though somewhat painful  Skin: No pallor, no redness or warmth to the touch      ED Course   Labs Reviewed - No data to display          Patient's x-ray results were independently reviewed by myself.  There is no acute fracture noted.    Patient's x-ray results were discussed with him.  Will place in knee immobilizer, provide with crutches, Ortho follow-up.  Recommend anti-inflammatories for pain         MDM      Contusion versus fracture of the patella                                   Medical Decision Making      Disposition and Plan     Clinical Impression:  1. Contusion of left knee, initial encounter         Disposition:  Discharge  8/13/2024 12:19 pm    Follow-up:  Loren Tuttle MD  [address],  [place] [address]  [phone]      As needed          Medications Prescribed:  Current Discharge Medication List

## 2024-09-05 ENCOUNTER — HOSPITAL ENCOUNTER (EMERGENCY)
Facility: HOSPITAL | Age: 39
Discharge: HOME | End: 2024-09-05
Attending: EMERGENCY MEDICINE
Payer: COMMERCIAL

## 2024-09-05 ENCOUNTER — APPOINTMENT (OUTPATIENT)
Dept: GENERAL RADIOLOGY | Facility: HOSPITAL | Age: 39
End: 2024-09-05
Attending: EMERGENCY MEDICINE
Payer: COMMERCIAL

## 2024-09-05 ENCOUNTER — APPOINTMENT (OUTPATIENT)
Dept: ULTRASOUND IMAGING | Facility: HOSPITAL | Age: 39
End: 2024-09-05
Attending: EMERGENCY MEDICINE
Payer: COMMERCIAL

## 2024-09-05 ENCOUNTER — HOSPITAL ENCOUNTER (OUTPATIENT)
Age: 39
Discharge: ANOTHER HOSPITAL | End: 2024-09-05
Attending: EMERGENCY MEDICINE
Payer: COMMERCIAL

## 2024-09-05 VITALS
OXYGEN SATURATION: 93 % | DIASTOLIC BLOOD PRESSURE: 89 MMHG | HEART RATE: 75 BPM | RESPIRATION RATE: 18 BRPM | SYSTOLIC BLOOD PRESSURE: 153 MMHG | TEMPERATURE: 97 F

## 2024-09-05 VITALS
TEMPERATURE: 98 F | DIASTOLIC BLOOD PRESSURE: 81 MMHG | RESPIRATION RATE: 18 BRPM | HEART RATE: 97 BPM | OXYGEN SATURATION: 96 % | SYSTOLIC BLOOD PRESSURE: 145 MMHG

## 2024-09-05 DIAGNOSIS — R10.11 ABDOMINAL PAIN, RIGHT UPPER QUADRANT: Primary | ICD-10-CM

## 2024-09-05 DIAGNOSIS — M79.671 RIGHT FOOT PAIN: ICD-10-CM

## 2024-09-05 DIAGNOSIS — M79.661 PAIN IN RIGHT LOWER LEG: ICD-10-CM

## 2024-09-05 DIAGNOSIS — R51.9 ACUTE INTRACTABLE HEADACHE, UNSPECIFIED HEADACHE TYPE: ICD-10-CM

## 2024-09-05 LAB
ALBUMIN SERPL-MCNC: 4.6 G/DL (ref 3.2–4.8)
ALBUMIN/GLOB SERPL: 1.4 {RATIO} (ref 1–2)
ALP LIVER SERPL-CCNC: 104 U/L
ALT SERPL-CCNC: 23 U/L
ANION GAP SERPL CALC-SCNC: 3 MMOL/L (ref 0–18)
AST SERPL-CCNC: 24 U/L (ref ?–34)
BASOPHILS # BLD AUTO: 0.13 X10(3) UL (ref 0–0.2)
BASOPHILS NFR BLD AUTO: 1.3 %
BILIRUB SERPL-MCNC: 0.6 MG/DL (ref 0.3–1.2)
BUN BLD-MCNC: 13 MG/DL (ref 9–23)
BUN/CREAT SERPL: 13.4 (ref 10–20)
CALCIUM BLD-MCNC: 9.5 MG/DL (ref 8.7–10.4)
CHLORIDE SERPL-SCNC: 110 MMOL/L (ref 98–112)
CO2 SERPL-SCNC: 27 MMOL/L (ref 21–32)
CREAT BLD-MCNC: 0.97 MG/DL
DEPRECATED RDW RBC AUTO: 44.1 FL (ref 35.1–46.3)
EGFRCR SERPLBLD CKD-EPI 2021: 102 ML/MIN/1.73M2 (ref 60–?)
EOSINOPHIL # BLD AUTO: 0.53 X10(3) UL (ref 0–0.7)
EOSINOPHIL NFR BLD AUTO: 5.4 %
ERYTHROCYTE [DISTWIDTH] IN BLOOD BY AUTOMATED COUNT: 14.2 % (ref 11–15)
GLOBULIN PLAS-MCNC: 3.2 G/DL (ref 2–3.5)
GLUCOSE BLD-MCNC: 90 MG/DL (ref 70–99)
HCT VFR BLD AUTO: 45.7 %
HGB BLD-MCNC: 14.9 G/DL
IMM GRANULOCYTES # BLD AUTO: 0.05 X10(3) UL (ref 0–1)
IMM GRANULOCYTES NFR BLD: 0.5 %
LIPASE SERPL-CCNC: 30 U/L (ref 12–53)
LYMPHOCYTES # BLD AUTO: 2.93 X10(3) UL (ref 1–4)
LYMPHOCYTES NFR BLD AUTO: 29.7 %
MCH RBC QN AUTO: 27.9 PG (ref 26–34)
MCHC RBC AUTO-ENTMCNC: 32.6 G/DL (ref 31–37)
MCV RBC AUTO: 85.6 FL
MONOCYTES # BLD AUTO: 0.56 X10(3) UL (ref 0.1–1)
MONOCYTES NFR BLD AUTO: 5.7 %
NEUTROPHILS # BLD AUTO: 5.66 X10 (3) UL (ref 1.5–7.7)
NEUTROPHILS # BLD AUTO: 5.66 X10(3) UL (ref 1.5–7.7)
NEUTROPHILS NFR BLD AUTO: 57.4 %
OSMOLALITY SERPL CALC.SUM OF ELEC: 290 MOSM/KG (ref 275–295)
PLATELET # BLD AUTO: 344 10(3)UL (ref 150–450)
POTASSIUM SERPL-SCNC: 3.5 MMOL/L (ref 3.5–5.1)
PROT SERPL-MCNC: 7.8 G/DL (ref 5.7–8.2)
RBC # BLD AUTO: 5.34 X10(6)UL
SARS-COV-2 RNA RESP QL NAA+PROBE: NOT DETECTED
SODIUM SERPL-SCNC: 140 MMOL/L (ref 136–145)
WBC # BLD AUTO: 9.9 X10(3) UL (ref 4–11)

## 2024-09-05 PROCEDURE — 80053 COMPREHEN METABOLIC PANEL: CPT | Performed by: EMERGENCY MEDICINE

## 2024-09-05 PROCEDURE — 80053 COMPREHEN METABOLIC PANEL: CPT

## 2024-09-05 PROCEDURE — 73630 X-RAY EXAM OF FOOT: CPT | Performed by: EMERGENCY MEDICINE

## 2024-09-05 PROCEDURE — 76705 ECHO EXAM OF ABDOMEN: CPT | Performed by: EMERGENCY MEDICINE

## 2024-09-05 PROCEDURE — 83690 ASSAY OF LIPASE: CPT | Performed by: EMERGENCY MEDICINE

## 2024-09-05 PROCEDURE — 85025 COMPLETE CBC W/AUTO DIFF WBC: CPT

## 2024-09-05 PROCEDURE — 36415 COLL VENOUS BLD VENIPUNCTURE: CPT

## 2024-09-05 PROCEDURE — 85025 COMPLETE CBC W/AUTO DIFF WBC: CPT | Performed by: EMERGENCY MEDICINE

## 2024-09-05 PROCEDURE — 99213 OFFICE O/P EST LOW 20 MIN: CPT

## 2024-09-05 PROCEDURE — 83690 ASSAY OF LIPASE: CPT

## 2024-09-05 PROCEDURE — 99284 EMERGENCY DEPT VISIT MOD MDM: CPT

## 2024-09-05 PROCEDURE — 93971 EXTREMITY STUDY: CPT | Performed by: EMERGENCY MEDICINE

## 2024-09-05 NOTE — ED INITIAL ASSESSMENT (HPI)
Patient presents with RUQ pain with NVD x1.5 weeks. Patient reports the pain has worsened over the last few days. Also endorses headache

## 2024-09-05 NOTE — ED PROVIDER NOTES
Patient Seen in: [facility] [place]      History     Chief Complaint   Patient presents with    Epigastric Pain     Stated Complaint: Headache, pain on Right foot    Subjective:   HPI    The patient is a 38-year-old male with past history of hyperlipidemia, morbid obesity who presents now with multiple medical complaints.  The patient states he was feeling well until approximately Friday of last week.  At that time, the patient developed a headache.  The patient describes the headache is relatively diffuse and persistent over the last several days.  The patient denies any fever.  Patient denies any sore throat.  Additionally, since approximately Monday, the patient has developed worsening upper abdominal pain.  The patient states his pain is most pronounced in the right upper quadrant/superior epigastric area.  The patient has vomited a few times at home but denies any significant diarrhea.  Patient states he last vomited yesterday afternoon, but has had persistent pain.  Additionally, the patient complains of pain in the plantar aspect of the right foot.  The patient states this pain radiates up the right lower leg to the right calf area.    Objective:   Past Medical History:    Hyperlipidemia    Visual impairment    glasses              Past Surgical History:   Procedure Laterality Date    Other surgical history  12/19/2019    excision chest wall cyst X 2                 No pertinent social history.            Review of Systems    Positive for stated Chief Complaint: Epigastric Pain    Other systems are as noted in HPI.  Constitutional and vital signs reviewed.      All other systems reviewed and negative except as noted above.    Physical Exam     ED Triage Vitals [09/05/24 1545]   /81   Pulse 97   Resp 18   Temp 98 °F (36.7 °C)   Temp src Temporal   SpO2 96 %   O2 Device None (Room air)       Current Vitals:   Vital Signs  BP: 145/81  Pulse: 97  Resp: 18  Temp: 98 °F (36.7 °C)  Temp src:  Temporal    Oxygen Therapy  SpO2: 96 %  O2 Device: None (Room air)            Physical Exam    Constitutional: Well-developed well-nourished in no acute distress  Head: Normocephalic, no swelling or tenderness  Eyes: Nonicteric sclera, no conjunctival injection  ENT: TMs are clear and flat bilaterally.  There is no posterior pharyngeal erythema  Chest: Clear to auscultation, no tenderness  Cardiovascular: Regular rate and rhythm without murmur; easily palpable right posterior tibial pulse  Abdomen: Soft and nondistended; there is focal right upper quadrant/superior epigastric tenderness to palpation.  There is no diffuse abdominal tenderness.  There is no guarding or rebound  Neurologic: Patient is awake, alert and oriented ×3.  The patient's motor strength is 5 out of 5 and symmetric in the upper and lower extremities bilaterally  Extremities: There is mild tenderness to the plantar aspect of the right foot.  There is minimal tenderness but no noted asymmetric swelling of the right calf  Skin: No pallor, no redness or warmth to the touch      ED Course   Labs Reviewed - No data to display          Due to the patient's right upper quadrant pain and inability to perform liver function/lipase testing here, recommend further evaluation in the ER.  Also recommend that the patient described his additional symptoms while there.  The patient states he will go to the [facility] emergency room now         MDM      abdominal pain including but not limited to appendicitis, cholecystitis, gastritis and urinary tract infection; right foot strain versus DVT                                     Medical Decision Making      Disposition and Plan     Clinical Impression:  1. Abdominal pain, right upper quadrant    2. Pain in right lower leg    3. Acute intractable headache, unspecified headache type         Disposition:  Ic to ed  9/5/2024  3:58 pm    Follow-up:  No follow-up provider specified.        Medications Prescribed:  Discharge  Medication List as of 9/5/2024  3:53 PM

## 2024-09-05 NOTE — ED INITIAL ASSESSMENT (HPI)
Patient reports 1 week history of \"feeling weird.\"  Reports episodes of emesis this past Tuesday and Wednesday.  + epigastric pain.  Reports normal urination.  Also reports ha x 1 week.  Also with right foot pain that radiates to right calf for sometime.  Denies trauma/injury.

## 2024-09-06 NOTE — ED PROVIDER NOTES
Patient Seen in: [facility] Emergency Department      History     Chief Complaint   Patient presents with    Abdomen/Flank Pain     Stated Complaint: from, [facility] for RUQ pain    Subjective:   HPI    38-year-old male with history of hypertension, diabetes, and hyperlipidemia presents with multiple complaints.  The patient reports that he had headache beginning last week which has largely resolved.  He then developed upper abdominal pain worsened to the right upper quadrant 3 days ago.  He reports some associated nausea.  He states the pain waxes and wanes in intensity.  He also reports subjective fever and night sweats.  He reports minimal cough.  No vomiting or diarrhea.  The patient also reports pain to his right foot over the past 2 weeks with radiation up the posterior aspect of his right leg.  He denies any known injury.    Objective:   Past Medical History:    Hyperlipidemia    Visual impairment    glasses              Past Surgical History:   Procedure Laterality Date    Other surgical history  12/19/2019    excision chest wall cyst X 2                 Social History     Socioeconomic History    Marital status:    Tobacco Use    Smoking status: Former    Smokeless tobacco: Never   Vaping Use    Vaping status: Never Used   Substance and Sexual Activity    Alcohol use: Not Currently    Drug use: Never     Social Determinants of Health     Financial Resource Strain: Low Risk  (3/29/2024)    Financial Resource Strain     Difficulty of Paying Living Expenses: Not very hard     Med Affordability: No   Food Insecurity: No Food Insecurity (3/26/2024)    Food Insecurity     Food Insecurity: Never true   Transportation Needs: No Transportation Needs (3/29/2024)    Transportation Needs     Lack of Transportation: No   Housing Stability: Low Risk  (3/26/2024)    Housing Stability     Housing Instability: No              Review of Systems    Positive for stated Chief Complaint: Abdomen/Flank Pain    Other  systems are as noted in HPI.  Constitutional and vital signs reviewed.      All other systems reviewed and negative except as noted above.    Physical Exam     ED Triage Vitals [09/05/24 1623]   BP (!) 134/96   Pulse 94   Resp 18   Temp 96.5 °F (35.8 °C)   Temp src Temporal   SpO2 94 %   O2 Device None (Room air)       Current Vitals:   Vital Signs  BP: (!) 166/106  Pulse: 89  Resp: 16  Temp: 96.5 °F (35.8 °C)  Temp src: Temporal    Oxygen Therapy  SpO2: 96 %  O2 Device: None (Room air)            Physical Exam      General Appearance: alert, no distress  Eyes: pupils equal and round no pallor or injection  ENT, Mouth: mucous membranes moist  Respiratory: there are no retractions, lungs are clear to auscultation  Cardiovascular: regular rate and rhythm  Gastrointestinal:  abdomen is soft with tenderness to the right upper quadrant, no masses, bowel sounds normal  Neurological: Speech normal.  Moving extremities x 4.  Skin: warm and dry, no rashes.  Musculoskeletal: neck is supple non tender        Extremities are symmetrical, full range of motion.  Tenderness to palpation of the plantar aspect of the right foot.  No overlying erythema or skin changes noted.  There is mild tenderness to palpation of the posterior aspect of the right leg.  No edema noted.  No erythema or warmth noted to the leg.  Bilateral dorsalis pedis pulses intact and symmetric.  No ankle or knee tenderness noted.  Psychiatric: patient is oriented X 3, there is no agitation    ED Course     Labs Reviewed   COMP METABOLIC PANEL (14) - Normal   LIPASE - Normal   RAPID SARS-COV-2 BY PCR - Normal   CBC WITH DIFFERENTIAL WITH PLATELET                    MDM      Foot x-ray was reviewed dependently by me.  No fracture or other acute abnormalities identified.  Lab and ultrasound results noted.  No cause of the patient's symptoms found.  Possible viral illness?  Comfortable appearing at present.  Will discharge home with recommendations to take Tylenol as  needed for pain.  He is advised to follow-up with his primary physician for further evaluation.  He is advised to return if worsening symptoms develop.                                   Medical Decision Making      Disposition and Plan     Clinical Impression:  1. Abdominal pain, right upper quadrant    2. Right foot pain         Disposition:  Discharge  9/5/2024  9:05 pm    Follow-up:  Ale Ocampo MD  [address] [place] [address]  [phone]    Follow up            Medications Prescribed:  Current Discharge Medication List

## 2024-09-06 NOTE — DISCHARGE INSTRUCTIONS
Take Tylenol as needed for pain.  Follow-up with your primary physician for further evaluation as discussed.  Return to the emergency department if increasing abdominal pain, repeated vomiting, or other new symptoms develop.

## 2024-09-18 ENCOUNTER — PATIENT MESSAGE (OUTPATIENT)
Dept: INTERNAL MEDICINE CLINIC | Facility: CLINIC | Age: 39
End: 2024-09-18

## 2024-09-19 RX ORDER — TIRZEPATIDE 2.5 MG/.5ML
2.5 INJECTION, SOLUTION SUBCUTANEOUS WEEKLY
Qty: 2 ML | Refills: 0 | Status: SHIPPED | OUTPATIENT
Start: 2024-09-19 | End: 2024-10-11

## 2024-09-19 NOTE — TELEPHONE ENCOUNTER
Shekhar Jones,     Lets try zepound since wegovy is on back order. I am sending precription for zepbound 2.5 mg injection. Take once a week for four weeks. Than follow up in office.   Thank you

## 2024-10-18 ENCOUNTER — HOSPITAL ENCOUNTER (EMERGENCY)
Facility: HOSPITAL | Age: 39
Discharge: HOME | End: 2024-10-19
Attending: EMERGENCY MEDICINE
Payer: COMMERCIAL

## 2024-10-18 DIAGNOSIS — T81.30XA WOUND DEHISCENCE: Primary | ICD-10-CM

## 2024-10-18 DIAGNOSIS — L91.0 SCARRING, KELOID: ICD-10-CM

## 2024-10-18 PROCEDURE — 99283 EMERGENCY DEPT VISIT LOW MDM: CPT

## 2024-10-18 PROCEDURE — 12013 RPR F/E/E/N/L/M 2.6-5.0 CM: CPT

## 2024-10-18 RX ORDER — DOXYCYCLINE 100 MG/1
100 CAPSULE ORAL ONCE
Status: COMPLETED | OUTPATIENT
Start: 2024-10-18 | End: 2024-10-18

## 2024-10-18 RX ORDER — MUPIROCIN 20 MG/G
1 OINTMENT TOPICAL 3 TIMES DAILY
Qty: 30 G | Refills: 0 | Status: SHIPPED | OUTPATIENT
Start: 2024-10-18 | End: 2024-10-25

## 2024-10-18 RX ORDER — DOXYCYCLINE 100 MG/1
100 CAPSULE ORAL 2 TIMES DAILY
Qty: 14 CAPSULE | Refills: 0 | Status: SHIPPED | OUTPATIENT
Start: 2024-10-18 | End: 2024-10-24

## 2024-10-19 VITALS
RESPIRATION RATE: 20 BRPM | WEIGHT: 315 LBS | BODY MASS INDEX: 44.1 KG/M2 | OXYGEN SATURATION: 97 % | HEIGHT: 71 IN | DIASTOLIC BLOOD PRESSURE: 83 MMHG | SYSTOLIC BLOOD PRESSURE: 152 MMHG | TEMPERATURE: 98 F | HEART RATE: 93 BPM

## 2024-10-19 NOTE — ED PROVIDER NOTES
Patient Seen in: [facility] Emergency Department    History     Chief Complaint   Patient presents with    Wound Recheck     Stated Complaint: suture issue     HPI    38-year-old male now POD 4 from keloid reduction surgery at [facility] presenting after right submandibular keloid with several days of mild drainage and now with loss of suture and wound dehiscence as noted while showering.  No fevers or chills, no nausea or vomiting.  No overt purulence.  No trauma.    Past Medical History:    Hyperlipidemia    Visual impairment    glasses       Past Surgical History:   Procedure Laterality Date    Other surgical history  12/19/2019    excision chest wall cyst X 2             Family History   Problem Relation Age of Onset    Diabetes Father     Diabetes Mother        Social History     Socioeconomic History    Marital status:    Tobacco Use    Smoking status: Former    Smokeless tobacco: Never   Vaping Use    Vaping status: Never Used   Substance and Sexual Activity    Alcohol use: Not Currently    Drug use: Never     Social Drivers of Health     Financial Resource Strain: Low Risk  (3/29/2024)    Financial Resource Strain     Difficulty of Paying Living Expenses: Not very hard     Med Affordability: No   Food Insecurity: No Food Insecurity (3/26/2024)    Food Insecurity     Food Insecurity: Never true   Transportation Needs: No Transportation Needs (3/29/2024)    Transportation Needs     Lack of Transportation: No   Housing Stability: Low Risk  (3/26/2024)    Housing Stability     Housing Instability: No       Review of Systems :  Constitutional: As per HPI  Musculoskeletal: Negative for joint swelling and arthralgias.   Skin: Negative for rash. No itching.  Positive for wound change.      Positive for stated complaint: suture issue  Other systems are as noted in HPI.  Constitutional and vital signs reviewed.      All other systems reviewed and negative except as noted above.    [facility]  elements reviewed from today and agreed except as otherwise stated in HPI.    Physical Exam     ED Triage Vitals [10/18/24 2304]   BP (!) 160/96   Pulse 103   Resp 18   Temp 98.2 °F (36.8 °C)   Temp src Oral   SpO2 96 %   O2 Device None (Room air)       Current:BP (!) 160/96   Pulse 103   Temp 98.2 °F (36.8 °C) (Oral)   Resp 18   Ht 180.3 cm (5' 11\")   Wt (!) 169.2 kg   SpO2 96%   BMI 52.02 kg/m²         Physical Exam   Constitutional: No distress.   HEENT: MMM.  No tongue elevation, no drooling or stridor.  Head: Normocephalic.   Eyes: No injection.   Neck: Neck supple.  Right submandibular keloid with suture line noted and with several areas of ruptured/missing suture and secondary wound dehiscence without cellulitic or crepitant/fluctuant change or purulent drainage.   Pulmonary/Chest: Effort normal.   Abdominal: Soft.   Musculoskeletal: No gross deformity.  Neurological: Alert.   Skin: Skin is warm.   Psychiatric: Cooperative.  Nursing note and vitals reviewed.        ED Course   Labs Reviewed - No data to display  Laceration repair:  Verbal consent was obtained from the patient.  The  4cm laceration on the right submandibular chin was scrubbed, draped and explored to its base with a gloved finger.  There were no deep structures involved. No tendon injury was identified.   The wound was repaired with cyanoacrylate and steristrips/benzoin.  The wound repair was simple.  The procedure was performed by myself.         MDM   DIFFERENTIAL DIAGNOSIS: After history and physical exam differential diagnosis includes but is not limited to wound dehiscence.    Pulse ox: 96%:Normal on RA, as independently interpreted by myself      Medical Decision Making  Evaluation for post keloid reduction surgery now with suture line rupture/loss and secondary wound dehiscence preceded by drainage without overt purulence or obvious infectious findings.  Patient denies given secondary dehiscence several days postprocedure, will  defer repeat suture placement particularly in setting of known keloid history.  Wounds reapproximated with cyanoacrylate in addition to Steri-Strips/benzoin, given submandibular area will discharge with empiric antibiotics.    Problems Addressed:  Scarring, keloid: acute illness or injury  Wound dehiscence: acute illness or injury      I was wearing at minimum a facemask and eye protection throughout this encounter with handwashing performed prior and after patient evaluation without personal hand/facial/oropharyngeal contact and gloves worn throughout encounter. See note and/or contact this provider for further PPE details.    Disposition and Plan     Clinical Impression:  1. Wound dehiscence    2. Scarring, keloid        Disposition:  Discharge    Follow-up:  Ale Ocampo MD  [address] [place] [address] [phone]    Call  For followup and re-evaluation.      Medications Prescribed:  Discharge Medication List as of 10/19/2024 12:18 AM        START taking these medications    Details   mupirocin 2 % External Ointment Apply 1 Application topically 3 (three) times daily for 7 days., Normal, Disp-30 g, R-0      doxycycline 100 MG Oral Cap Take 1 capsule (100 mg total) by mouth 2 (two) times daily for 7 days., Normal, Disp-14 capsule, R-0

## 2024-10-24 ENCOUNTER — OFFICE VISIT (OUTPATIENT)
Dept: INTERNAL MEDICINE CLINIC | Facility: CLINIC | Age: 39
End: 2024-10-24

## 2024-10-24 VITALS
BODY MASS INDEX: 44.1 KG/M2 | HEART RATE: 80 BPM | SYSTOLIC BLOOD PRESSURE: 125 MMHG | WEIGHT: 315 LBS | DIASTOLIC BLOOD PRESSURE: 88 MMHG | HEIGHT: 71 IN

## 2024-10-24 DIAGNOSIS — E66.813 CLASS 3 SEVERE OBESITY DUE TO EXCESS CALORIES WITHOUT SERIOUS COMORBIDITY WITH BODY MASS INDEX (BMI) OF 50.0 TO 59.9 IN ADULT (HCC): Primary | ICD-10-CM

## 2024-10-24 DIAGNOSIS — E66.01 CLASS 3 SEVERE OBESITY DUE TO EXCESS CALORIES WITHOUT SERIOUS COMORBIDITY WITH BODY MASS INDEX (BMI) OF 50.0 TO 59.9 IN ADULT (HCC): Primary | ICD-10-CM

## 2024-10-24 DIAGNOSIS — I10 ESSENTIAL HYPERTENSION: ICD-10-CM

## 2024-10-24 PROCEDURE — 99213 OFFICE O/P EST LOW 20 MIN: CPT | Performed by: INTERNAL MEDICINE

## 2024-10-24 RX ORDER — METOPROLOL SUCCINATE 25 MG/1
25 TABLET, EXTENDED RELEASE ORAL
Qty: 90 TABLET | Refills: 3 | Status: SHIPPED | OUTPATIENT
Start: 2024-10-24

## 2024-10-24 RX ORDER — TIRZEPATIDE 5 MG/.5ML
5 INJECTION, SOLUTION SUBCUTANEOUS WEEKLY
Qty: 2 ML | Refills: 0 | Status: SHIPPED | OUTPATIENT
Start: 2024-10-24 | End: 2024-11-15

## 2024-10-24 NOTE — PROGRESS NOTES
Robert Subramanian is a 38 year old male.  Chief Complaint   Patient presents with    Weight Management     HPI:    Patient presented today for follow up. He states that he was off the zepbound for two weeks because of surgery to his keloid on face. Took last dose yesterday. Denies any abdominal pain, nausea or vomiting. No other complains.    Has been trying to increase his physical activity. No other complains.       Current Outpatient Medications   Medication Sig Dispense Refill    Tirzepatide-Weight Management (ZEPBOUND) 5 MG/0.5ML Subcutaneous Solution Auto-injector Inject 5 mg into the skin once a week for 4 doses. 2 mL 0    metoprolol succinate ER 25 MG Oral Tablet 24 Hr Take 1 tablet (25 mg total) by mouth Daily Beta Blocker. 90 tablet 3    mupirocin 2 % External Ointment Apply 1 Application topically 3 (three) times daily for 7 days. 30 g 0      Past Medical History:    Hyperlipidemia    Visual impairment    glasses      Past Surgical History:   Procedure Laterality Date    Other surgical history  12/19/2019    excision chest wall cyst X 2       Social History:  Social History     Socioeconomic History    Marital status:    Tobacco Use    Smoking status: Former    Smokeless tobacco: Never   Vaping Use    Vaping status: Never Used   Substance and Sexual Activity    Alcohol use: Not Currently    Drug use: Never     Social Drivers of Health     Financial Resource Strain: Low Risk  (3/29/2024)    Financial Resource Strain     Difficulty of Paying Living Expenses: Not very hard     Med Affordability: No   Food Insecurity: No Food Insecurity (3/26/2024)    Food Insecurity     Food Insecurity: Never true   Transportation Needs: No Transportation Needs (3/29/2024)    Transportation Needs     Lack of Transportation: No   Housing Stability: Low Risk  (3/26/2024)    Housing Stability     Housing Instability: No      Family History   Problem Relation Age of Onset    Diabetes Father     Diabetes Mother        Allergies[1]     REVIEW OF SYSTEMS:   Review of Systems   Review of Systems   Constitutional: Negative for activity change, appetite change and fever.   HENT: Negative for congestion and voice change.    Respiratory: Negative for cough and shortness of breath.    Cardiovascular: Negative for chest pain.   Gastrointestinal: Negative for abdominal distention, abdominal pain and vomiting.   Genitourinary: Negative for hematuria.   Skin: Negative for wound.   Psychiatric/Behavioral: Negative for behavioral problems.   Wt Readings from Last 5 Encounters:   10/24/24 (!) 368 lb (166.9 kg)   10/18/24 (!) 373 lb (169.2 kg)   08/12/24 (!) 369 lb (167.4 kg)   07/15/24 (!) 371 lb 8 oz (168.5 kg)   06/12/24 (!) 371 lb 14.4 oz (168.7 kg)     Body mass index is 51.33 kg/m².      EXAM:   /88   Pulse 80   Ht 5' 11\" (1.803 m)   Wt (!) 368 lb (166.9 kg)   BMI 51.33 kg/m²   Physical Exam   Constitutional:       Appearance: Normal appearance.   HENT:      Head: Normocephalic.   Eyes:      Conjunctiva/sclera: Conjunctivae normal.   Cardiovascular:      Rate and Rhythm: Normal rate and regular rhythm.      Heart sounds: Normal heart sounds. No murmur heard.  Pulmonary:      Effort: Pulmonary effort is normal.      Breath sounds: Normal breath sounds. No rhonchi or rales.   Abdominal:      General: Bowel sounds are normal.      Palpations: Abdomen is soft.      Tenderness: There is no abdominal tenderness.   Musculoskeletal:      Cervical back: Neck supple.      Right lower leg: No edema.      Left lower leg: No edema.   Skin:     General: Skin is warm and dry.   Neurological:      General: No focal deficit present.      Mental Status: He is alert and oriented to person, place, and time. Mental status is at baseline.   Psychiatric:         Mood and Affect: Mood normal.         Behavior: Behavior normal.       ASSESSMENT AND PLAN:   1. Class 3 severe obesity due to excess calories without serious comorbidity with body mass index  (BMI) of 50.0 to 59.9 in adult (HCC)  - detailed discussion regarding and diet and exercise modifications  - add more fruits and veggies to your diet  - increase hydration   - increased dose to 5 mg for four weeks than 7.5 mg for 4 weeks.   - follow up in 2 months  - will do blood work at next visit    2. Essential hypertension  - well controlled  - metoprolol succinate ER 25 MG Oral Tablet 24 Hr; Take 1 tablet (25 mg total) by mouth Daily Beta Blocker.  Dispense: 90 tablet; Refill: 3    3- keloid  - s/p surgery  - will be starting intralesional injections soon with derm       The patient indicates understanding of these issues and agrees to the plan.  Follow up in 2 months    Ale Ocampo MD        [1] No Known Allergies

## 2024-11-21 ENCOUNTER — PATIENT MESSAGE (OUTPATIENT)
Dept: INTERNAL MEDICINE CLINIC | Facility: CLINIC | Age: 39
End: 2024-11-21

## 2024-11-23 ENCOUNTER — HOSPITAL ENCOUNTER (EMERGENCY)
Facility: HOSPITAL | Age: 39
Discharge: HOME | End: 2024-11-24
Attending: EMERGENCY MEDICINE
Payer: COMMERCIAL

## 2024-11-23 ENCOUNTER — APPOINTMENT (OUTPATIENT)
Dept: CT IMAGING | Facility: HOSPITAL | Age: 39
End: 2024-11-23
Attending: EMERGENCY MEDICINE
Payer: COMMERCIAL

## 2024-11-23 DIAGNOSIS — A08.4 VIRAL ENTERITIS: Primary | ICD-10-CM

## 2024-11-23 LAB
ALBUMIN SERPL-MCNC: 5 G/DL (ref 3.2–4.8)
ALBUMIN/GLOB SERPL: 1.8 {RATIO} (ref 1–2)
ALP LIVER SERPL-CCNC: 105 U/L
ALT SERPL-CCNC: 24 U/L
ANION GAP SERPL CALC-SCNC: 10 MMOL/L (ref 0–18)
AST SERPL-CCNC: 26 U/L (ref ?–34)
BASOPHILS # BLD AUTO: 0.04 X10(3) UL (ref 0–0.2)
BASOPHILS NFR BLD AUTO: 0.3 %
BILIRUB SERPL-MCNC: 0.6 MG/DL (ref 0.3–1.2)
BILIRUB UR QL: NEGATIVE
BUN BLD-MCNC: 16 MG/DL (ref 9–23)
BUN/CREAT SERPL: 15 (ref 10–20)
CALCIUM BLD-MCNC: 9.8 MG/DL (ref 8.7–10.4)
CHLORIDE SERPL-SCNC: 108 MMOL/L (ref 98–112)
CLARITY UR: CLEAR
CO2 SERPL-SCNC: 24 MMOL/L (ref 21–32)
COLOR UR: YELLOW
CREAT BLD-MCNC: 1.07 MG/DL
DEPRECATED RDW RBC AUTO: 43.1 FL (ref 35.1–46.3)
EGFRCR SERPLBLD CKD-EPI 2021: 91 ML/MIN/1.73M2 (ref 60–?)
EOSINOPHIL # BLD AUTO: 0.42 X10(3) UL (ref 0–0.7)
EOSINOPHIL NFR BLD AUTO: 3.1 %
ERYTHROCYTE [DISTWIDTH] IN BLOOD BY AUTOMATED COUNT: 14.2 % (ref 11–15)
GLOBULIN PLAS-MCNC: 2.8 G/DL (ref 2–3.5)
GLUCOSE BLD-MCNC: 112 MG/DL (ref 70–99)
GLUCOSE UR-MCNC: NORMAL MG/DL
HCT VFR BLD AUTO: 46.4 %
HGB BLD-MCNC: 16 G/DL
HGB UR QL STRIP.AUTO: NEGATIVE
IMM GRANULOCYTES # BLD AUTO: 0.05 X10(3) UL (ref 0–1)
IMM GRANULOCYTES NFR BLD: 0.4 %
KETONES UR-MCNC: 10 MG/DL
LEUKOCYTE ESTERASE UR QL STRIP.AUTO: NEGATIVE
LIPASE SERPL-CCNC: 34 U/L (ref 12–53)
LYMPHOCYTES # BLD AUTO: 1.69 X10(3) UL (ref 1–4)
LYMPHOCYTES NFR BLD AUTO: 12.6 %
MCH RBC QN AUTO: 28.8 PG (ref 26–34)
MCHC RBC AUTO-ENTMCNC: 34.5 G/DL (ref 31–37)
MCV RBC AUTO: 83.5 FL
MONOCYTES # BLD AUTO: 0.68 X10(3) UL (ref 0.1–1)
MONOCYTES NFR BLD AUTO: 5.1 %
NEUTROPHILS # BLD AUTO: 10.5 X10 (3) UL (ref 1.5–7.7)
NEUTROPHILS # BLD AUTO: 10.5 X10(3) UL (ref 1.5–7.7)
NEUTROPHILS NFR BLD AUTO: 78.5 %
NITRITE UR QL STRIP.AUTO: NEGATIVE
OSMOLALITY SERPL CALC.SUM OF ELEC: 296 MOSM/KG (ref 275–295)
PH UR: 5.5 [PH] (ref 5–8)
PLATELET # BLD AUTO: 340 10(3)UL (ref 150–450)
POTASSIUM SERPL-SCNC: 3.9 MMOL/L (ref 3.5–5.1)
PROT SERPL-MCNC: 7.8 G/DL (ref 5.7–8.2)
PROT UR-MCNC: 30 MG/DL
RBC # BLD AUTO: 5.56 X10(6)UL
SODIUM SERPL-SCNC: 142 MMOL/L (ref 136–145)
SP GR UR STRIP: >1.03 (ref 1–1.03)
UROBILINOGEN UR STRIP-ACNC: NORMAL
WBC # BLD AUTO: 13.4 X10(3) UL (ref 4–11)

## 2024-11-23 PROCEDURE — 99285 EMERGENCY DEPT VISIT HI MDM: CPT

## 2024-11-23 PROCEDURE — 96361 HYDRATE IV INFUSION ADD-ON: CPT

## 2024-11-23 PROCEDURE — 96376 TX/PRO/DX INJ SAME DRUG ADON: CPT

## 2024-11-23 PROCEDURE — 96374 THER/PROPH/DIAG INJ IV PUSH: CPT

## 2024-11-23 PROCEDURE — 85025 COMPLETE CBC W/AUTO DIFF WBC: CPT | Performed by: EMERGENCY MEDICINE

## 2024-11-23 PROCEDURE — 80053 COMPREHEN METABOLIC PANEL: CPT | Performed by: EMERGENCY MEDICINE

## 2024-11-23 PROCEDURE — 74177 CT ABD & PELVIS W/CONTRAST: CPT | Performed by: EMERGENCY MEDICINE

## 2024-11-23 PROCEDURE — 96375 TX/PRO/DX INJ NEW DRUG ADDON: CPT

## 2024-11-23 PROCEDURE — 83690 ASSAY OF LIPASE: CPT | Performed by: EMERGENCY MEDICINE

## 2024-11-23 PROCEDURE — 81001 URINALYSIS AUTO W/SCOPE: CPT | Performed by: EMERGENCY MEDICINE

## 2024-11-23 RX ORDER — ONDANSETRON 2 MG/ML
4 INJECTION INTRAMUSCULAR; INTRAVENOUS ONCE
Status: COMPLETED | OUTPATIENT
Start: 2024-11-23 | End: 2024-11-23

## 2024-11-23 RX ORDER — MORPHINE SULFATE 4 MG/ML
4 INJECTION, SOLUTION INTRAMUSCULAR; INTRAVENOUS ONCE
Status: COMPLETED | OUTPATIENT
Start: 2024-11-23 | End: 2024-11-23

## 2024-11-23 RX ORDER — MORPHINE SULFATE 2 MG/ML
2 INJECTION, SOLUTION INTRAMUSCULAR; INTRAVENOUS ONCE
Status: COMPLETED | OUTPATIENT
Start: 2024-11-23 | End: 2024-11-23

## 2024-11-23 RX ORDER — KETOROLAC TROMETHAMINE 15 MG/ML
15 INJECTION, SOLUTION INTRAMUSCULAR; INTRAVENOUS ONCE
Status: COMPLETED | OUTPATIENT
Start: 2024-11-23 | End: 2024-11-23

## 2024-11-24 VITALS
OXYGEN SATURATION: 94 % | HEIGHT: 71 IN | TEMPERATURE: 98 F | SYSTOLIC BLOOD PRESSURE: 153 MMHG | RESPIRATION RATE: 19 BRPM | DIASTOLIC BLOOD PRESSURE: 102 MMHG | HEART RATE: 102 BPM | BODY MASS INDEX: 44.1 KG/M2 | WEIGHT: 315 LBS

## 2024-11-24 RX ORDER — ONDANSETRON 4 MG/1
4 TABLET, ORALLY DISINTEGRATING ORAL EVERY 8 HOURS PRN
Qty: 6 TABLET | Refills: 0 | Status: SHIPPED | OUTPATIENT
Start: 2024-11-24

## 2024-11-24 RX ORDER — DICYCLOMINE HCL 20 MG
20 TABLET ORAL 4 TIMES DAILY PRN
Qty: 20 TABLET | Refills: 0 | Status: SHIPPED | OUTPATIENT
Start: 2024-11-24 | End: 2024-12-14

## 2024-11-24 RX ORDER — FAMOTIDINE 20 MG/1
20 TABLET, FILM COATED ORAL DAILY
Qty: 7 TABLET | Refills: 0 | Status: SHIPPED | OUTPATIENT
Start: 2024-11-24 | End: 2024-12-01

## 2024-11-24 NOTE — ED INITIAL ASSESSMENT (HPI)
Pt presents to ed with c/o abd pain. Pt reports mid epigastric pain that started this morning and has not improved. Pt reports chills and hot flashes. + n/v/d.     No meds pta

## 2024-11-24 NOTE — ED PROVIDER NOTES
Patient Seen in: [facility] Emergency Department      History     Chief Complaint   Patient presents with    Abdomen/Flank Pain     Stated Complaint: Abd pain, nv    Subjective:   HPI      Patient presents the emergency department complaining of diffuse abdominal pain with nausea vomiting and diarrhea.  He states that he began feeling ill today.  He  denies fever but had some chills.  There is no other aggravating or alleviating factors.  There is no cough.  There is no sick contacts at home.  There is no suspect food ingestion for food poisoning or recent antibiotic use.    Objective:     Past Medical History:    Essential hypertension    Hyperlipidemia    Visual impairment    glasses              Past Surgical History:   Procedure Laterality Date    Other surgical history  12/19/2019    excision chest wall cyst X 2                 Social History     Socioeconomic History    Marital status:    Tobacco Use    Smoking status: Former    Smokeless tobacco: Never   Vaping Use    Vaping status: Never Used   Substance and Sexual Activity    Alcohol use: Not Currently    Drug use: Never     Social Drivers of Health     Financial Resource Strain: Low Risk  (3/29/2024)    Financial Resource Strain     Difficulty of Paying Living Expenses: Not very hard     Med Affordability: No   Food Insecurity: No Food Insecurity (3/26/2024)    Food Insecurity     Food Insecurity: Never true   Transportation Needs: No Transportation Needs (3/29/2024)    Transportation Needs     Lack of Transportation: No   Housing Stability: Low Risk  (3/26/2024)    Housing Stability     Housing Instability: No                  Physical Exam     ED Triage Vitals [11/23/24 1947]   BP (!) 161/107   Pulse 117   Resp 24   Temp 97.9 °F (36.6 °C)   Temp src Temporal   SpO2 93 %   O2 Device None (Room air)       Current Vitals:   Vital Signs  BP: (!) 153/102  Pulse: 102  Resp: 19  Temp: 97.9 °F (36.6 °C)  Temp src: Temporal  MAP (mmHg): (!)  117    Oxygen Therapy  SpO2: 94 %  O2 Device: None (Room air)        Physical Exam  Vitals and nursing note reviewed.   Constitutional:       General: He is not in acute distress.     Appearance: He is well-developed.   HENT:      Head: Normocephalic.      Nose: Nose normal.      Mouth/Throat:      Mouth: Mucous membranes are moist.   Eyes:      Conjunctiva/sclera: Conjunctivae normal.   Cardiovascular:      Rate and Rhythm: Normal rate and regular rhythm.      Heart sounds: No murmur heard.  Pulmonary:      Effort: Pulmonary effort is normal. No respiratory distress.      Breath sounds: Normal breath sounds.   Abdominal:      General: There is no distension.      Palpations: Abdomen is soft.      Tenderness: There is generalized abdominal tenderness. There is no guarding or rebound.   Musculoskeletal:         General: No tenderness. Normal range of motion.      Cervical back: Normal range of motion and neck supple.   Skin:     General: Skin is warm and dry.      Capillary Refill: Capillary refill takes less than 2 seconds.      Findings: No rash.   Neurological:      General: No focal deficit present.      Mental Status: He is alert and oriented to person, place, and time.             ED Course     Labs Reviewed   COMP METABOLIC PANEL (14) - Abnormal; Notable for the following components:       Result Value    Glucose 112 (*)     Calculated Osmolality 296 (*)     Albumin 5.0 (*)     All other components within normal limits   CBC WITH DIFFERENTIAL WITH PLATELET - Abnormal; Notable for the following components:    WBC 13.4 (*)     Neutrophil Absolute Prelim 10.50 (*)     Neutrophil Absolute 10.50 (*)     All other components within normal limits   URINALYSIS WITH CULTURE REFLEX - Abnormal; Notable for the following components:    Spec Gravity >1.030 (*)     Ketones Urine 10 (*)     Protein Urine 30 (*)     Squamous Epi. Cells Few (*)     All other components within normal limits   LIPASE - Normal                    MDM              Medical Decision Making  Differential diagnosis considered for pancreatitis, enteritis, food poisoning, colitis, bowel obstruction.    Problems Addressed:  Viral enteritis: acute illness or injury    Amount and/or Complexity of Data Reviewed  Labs: ordered. Decision-making details documented in ED Course.     Details: Mildly elevated white blood cell count.  Chemistry panel unremarkable.  Lipase normal  Radiology: ordered and independent interpretation performed. Decision-making details documented in ED Course.     Details: CT scan shows findings consistent with enteritis  Discussion of management or test interpretation with external provider(s): Patient was given pain medication as well as nausea medicine feels better.  Fluids given.  Likely viral illness.  No indication for antibiotics.  Recommend follow-up with primary care physician.    Risk  Prescription drug management.  Parenteral controlled substances.        Disposition and Plan     Clinical Impression:  1. Viral enteritis         Disposition:  Discharge  11/24/2024 12:11 am    Follow-up:  Ale Ocampo MD  [address] [place] [address]  [phone]    Schedule an appointment as soon as possible for a visit            Medications Prescribed:  Discharge Medication List as of 11/24/2024 12:35 AM        START taking these medications    Details   ondansetron 4 MG Oral Tablet Dispersible Take 1 tablet (4 mg total) by mouth every 8 (eight) hours as needed for Nausea., Normal, Disp-6 tablet, R-0      dicyclomine 20 MG Oral Tab Take 1 tablet (20 mg total) by mouth 4 (four) times daily as needed., Normal, Disp-20 tablet, R-0      famotidine (PEPCID) 20 MG Oral Tab Take 1 tablet (20 mg total) by mouth daily for 7 days., Normal, Disp-7 tablet, R-0                 Supplementary Documentation:

## 2024-11-24 NOTE — DISCHARGE INSTRUCTIONS
-- DO NOT REPLY / DO NOT REPLY ALL --  -- Message is from the [facility]--    General Patient Message      Reason for Call: Pharmacy was calling in to get a diagnosis for the patient so he can fill her norco prescription. Please call pharmacy when you have a chance.    Caller Information       Type Contact Phone    01/04/2020 01:11 PM Phone (Incoming) [facility] [id number] [facility] [address] (Pharmacy) [phone]          Alternative phone number: none    Turnaround time given to caller:   \"This message will be sent to [state Provider's name]. The clinical team will fulfill your request as soon as they review your message when the office opens on Monday (or after the holiday).\"}     Drink plenty of fluids. Zofran as needed. Bentyl as needed for crampy pain. Follow up with PMD for recheck in 1-2 days.

## 2024-11-26 RX ORDER — TIRZEPATIDE 7.5 MG/.5ML
7.5 INJECTION, SOLUTION SUBCUTANEOUS WEEKLY
Qty: 2 ML | Refills: 0 | Status: SHIPPED | OUTPATIENT
Start: 2024-11-26 | End: 2024-12-18

## 2024-11-26 NOTE — TELEPHONE ENCOUNTER
Please review. Protocol Failed; No Protocol    Requested Prescriptions   Pending Prescriptions Disp Refills    Tirzepatide-Weight Management (ZEPBOUND) 7.5 MG/0.5ML Subcutaneous Solution Auto-injector 2 mL 0     Sig: Inject 7.5 mg into the skin once a week for 4 doses.       There is no refill protocol information for this order              Recent Outpatient Visits              1 month ago Class 3 severe obesity due to excess calories without serious comorbidity with body mass index (BMI) of 50.0 to 59.9 in adult ([facility]    3 months ago Class 3 severe obesity due to excess calories without serious comorbidity with body mass index (BMI) of 50.0 to 59.9 in adult ([facility]    Office Visit    4 months ago Class 3 severe obesity due to excess calories without serious comorbidity with body mass index (BMI) of 50.0 to 59.9 in adult ([facility])    [facility]    Office Visit    5 months ago Essential hypertension    [facility] [address], [place] Adriana Peters MD    Office Visit    6 months ago Suspected sleep apnea    [facility]    Office Visit

## 2024-11-29 ENCOUNTER — LAB ENCOUNTER (OUTPATIENT)
Dept: LAB | Facility: HOSPITAL | Age: 39
End: 2024-11-29
Attending: STUDENT IN AN ORGANIZED HEALTH CARE EDUCATION/TRAINING PROGRAM
Payer: COMMERCIAL

## 2024-11-29 ENCOUNTER — LAB ENCOUNTER (OUTPATIENT)
Dept: LAB | Age: 39
End: 2024-11-29
Attending: STUDENT IN AN ORGANIZED HEALTH CARE EDUCATION/TRAINING PROGRAM
Payer: COMMERCIAL

## 2024-11-29 ENCOUNTER — OFFICE VISIT (OUTPATIENT)
Dept: INTERNAL MEDICINE CLINIC | Facility: CLINIC | Age: 39
End: 2024-11-29

## 2024-11-29 VITALS
HEART RATE: 83 BPM | BODY MASS INDEX: 44.1 KG/M2 | SYSTOLIC BLOOD PRESSURE: 122 MMHG | DIASTOLIC BLOOD PRESSURE: 80 MMHG | HEIGHT: 71 IN | WEIGHT: 315 LBS

## 2024-11-29 DIAGNOSIS — G89.29 CHRONIC ABDOMINAL PAIN: ICD-10-CM

## 2024-11-29 DIAGNOSIS — K57.90 DIVERTICULOSIS: ICD-10-CM

## 2024-11-29 DIAGNOSIS — E66.01 MORBID OBESITY DUE TO EXCESS CALORIES (HCC): ICD-10-CM

## 2024-11-29 DIAGNOSIS — A08.4 GASTROENTERITIS AND COLITIS, VIRAL: Primary | ICD-10-CM

## 2024-11-29 DIAGNOSIS — R10.9 CHRONIC ABDOMINAL PAIN: ICD-10-CM

## 2024-11-29 DIAGNOSIS — A08.4 GASTROENTERITIS AND COLITIS, VIRAL: ICD-10-CM

## 2024-11-29 DIAGNOSIS — Z80.0 FAMILY HISTORY OF COLORECTAL CANCER: ICD-10-CM

## 2024-11-29 PROCEDURE — 87338 HPYLORI STOOL AG IA: CPT

## 2024-11-29 PROCEDURE — 83993 ASSAY FOR CALPROTECTIN FECAL: CPT

## 2024-11-29 PROCEDURE — 99215 OFFICE O/P EST HI 40 MIN: CPT | Performed by: STUDENT IN AN ORGANIZED HEALTH CARE EDUCATION/TRAINING PROGRAM

## 2024-11-29 NOTE — PROGRESS NOTES
OFFICE NOTE     Patient ID: Robert Subramanian is a 39 year old male.  Today's Date: 11/29/24  Chief Complaint: ER F/U    Pt is a 39y/po male with Pmhx of Brugada syndrome, Morbid Obesity BMI 50.77, ERNESTO, whom presents to clinic to discuss CT scan findings that were done in ER11/23 for viral enteritis. Was discahrged with dicycomine QID, zoffran and famotadine..        CT abdomen 11/24: CONCLUSION:   1. Diffuse intraluminal fluid throughout the gastrointestinal tract without evidence of obstruction.  These findings could relate to gastroenteritis or malabsorption.   2. Otherwise no acute intra-abdominal process.   3. Uncomplicated distal colonic diverticulosis.      Vitals:    11/29/24 1218   BP: 122/80   Pulse: 83   Weight: (!) 363 lb (164.7 kg)   Height: 5' 11\" (1.803 m)     body mass index is 50.63 kg/m².  BP Readings from Last 3 Encounters:   11/29/24 122/80   11/24/24 (!) 153/102   10/24/24 125/88     The 10-year ASCVD risk score (Kimberly SCHMIDT, et al., 2019) is: 5.5%    Values used to calculate the score:      Age: 39 years      Sex: Male      Is Non- : Yes      Diabetic: No      Tobacco smoker: No      Systolic Blood Pressure: 122 mmHg      Is BP treated: Yes      HDL Cholesterol: 30 mg/dL      Total Cholesterol: 199 mg/dL      Medications reviewed:  Current Outpatient Medications   Medication Sig Dispense Refill    Tirzepatide-Weight Management (ZEPBOUND) 7.5 MG/0.5ML Subcutaneous Solution Auto-injector Inject 7.5 mg into the skin once a week for 4 doses. 2 mL 0    metoprolol succinate ER 25 MG Oral Tablet 24 Hr Take 1 tablet (25 mg total) by mouth Daily Beta Blocker. 90 tablet 3    ondansetron 4 MG Oral Tablet Dispersible Take 1 tablet (4 mg total) by mouth every 8 (eight) hours as needed for Nausea. (Patient not taking: Reported on 11/29/2024) 6 tablet 0    dicyclomine 20 MG Oral Tab Take 1 tablet (20 mg total) by mouth 4 (four) times daily as needed. (Patient not  taking: Reported on 2024) 20 tablet 0    famotidine (PEPCID) 20 MG Oral Tab Take 1 tablet (20 mg total) by mouth daily for 7 days. (Patient not taking: Reported on 2024) 7 tablet 0         Assessment & Plan    1. Gastroenteritis and colitis, viral (Primary)  -     Cancel: Gastro Referral - In Network  -     Gastro Referral - In Network  -     H. Pylori Stool Ag, EIA; Future; Expected date: 2024  2. Diverticulosis  -     Cancel: Gastro Referral - In Network  -     Gastro Referral - In Network  3. Family history of colorectal cancer  -     Gastro Referral - In Network  4. Morbid obesity due to excess calories (HCC)  -     Cancel: Jump Start Your Health; Future; Expected date: 2025  5. Chronic abdominal pain  -     H. Pylori Stool Ag, EIA; Future; Expected date: 2024  -     Calprotectin, Fecal; Future; Expected date: 2024  Pt with recent Gastroenteritis and noted diverticulosis on imaging, spent 15 minutes on counseling on CT scan finding and diverticulosis and education of diet of water, fiber (no nuts/seeds) and follow up with Gastroenterology for further evaluation  Plan:  -obtain H pylori stool test if positive start triple therapy and if negative PPI  -order Stool calprotectin  -GI referral for screening colonoscopy    Follow Up: As needed/if symptoms worsen or No follow-ups on file..     I spent 34 minutes obtaining pertitent medical history, reviewing pertinent imaging/labs and specialists notes, evaluating patient, discussing differential diagnosis' and various treatment options, reinforcing importance of compliance with treatment plan, and completing documentation.     Encounter Times  PreChartin minutes    Reviewing/Obtainin minutes      Medical Exam: 4 minutes    Plan: 4 minutes      Notes: 5 minutes    Counseling/Education: 7 minutes      Referring/Communicating:   minutes    Ind Interpretation:   minutes      Care Coordination:   minutes       My total time  spent caring for the patient on the day of the encounter: 41 minutes.   Objective/ Results:   Physical Exam  Constitutional:       Appearance: He is well-developed.   Cardiovascular:      Rate and Rhythm: Normal rate and regular rhythm.      Heart sounds: Normal heart sounds.   Pulmonary:      Effort: Pulmonary effort is normal.      Breath sounds: Normal breath sounds.   Abdominal:      General: Bowel sounds are normal.      Palpations: Abdomen is soft.   Skin:     General: Skin is warm and dry.   Neurological:      Mental Status: He is alert and oriented to person, place, and time.      Deep Tendon Reflexes: Reflexes are normal and symmetric.        Reviewed:    Patient Active Problem List    Diagnosis    Family history of colorectal cancer    Severe obstructive sleep apnea    Brugada syndrome    Transient hypotension    Tachycardia      Allergies[1]     Social History     Socioeconomic History    Marital status:    Tobacco Use    Smoking status: Former    Smokeless tobacco: Never   Vaping Use    Vaping status: Never Used   Substance and Sexual Activity    Alcohol use: Yes     Comment: seldom use    Drug use: Never     Social Drivers of Health     Financial Resource Strain: Low Risk  (3/29/2024)    Financial Resource Strain     Difficulty of Paying Living Expenses: Not very hard     Med Affordability: No   Food Insecurity: No Food Insecurity (3/26/2024)    Food Insecurity     Food Insecurity: Never true   Transportation Needs: No Transportation Needs (3/29/2024)    Transportation Needs     Lack of Transportation: No   Housing Stability: Low Risk  (3/26/2024)    Housing Stability     Housing Instability: No      Review of Systems   Constitutional: Negative.    Respiratory: Negative.     Cardiovascular: Negative.    Gastrointestinal: Negative.    Skin: Negative.    Neurological: Negative.      All other systems negative unless otherwise stated in ROS or HPI above.       Kwaku Gloria MD  Internal Medicine        Call office with any questions or seek emergency care if necessary.   Patient understands and agrees to follow directions and advice.      ----------------------------------------- PATIENT INSTRUCTIONS-----------------------------------------     Patient Instructions   Diverticulosis    What is Diverticulosis?  Diverticuli are small, sac like pouches in the colon that are caused from a weakening of the muscle of the colon wall.  Diverticulosis affects both males and females equally and her risk for diverticulosis increases as we age.    How often does diverticulosis of care?  The prevalence of diverticulosis increases as we age.  At age 40 only 20% of the population have diverticulosis whereas at age 60, 60% of the palpation have diverticulosis (Laura, 2021)    What complications can happen from diverticulosis?  How often does this happen?  The most well-known complication from diverticulosis is diverticulitis, however, this only occurs in 15 to 25% of patients ([place], 2022).  Diverticulitis occurs when the pockets become infected and inflamed.  You may have diverticulosis and never developed diverticulitis.    What can you do to prevent more diverticulosis and complications?  Once you have diverticulosis there is no known way to eliminate them, you can only potentially prevent more from forming.  Overall, the best recommendation is to lead a healthy lifestyle, eating a diet high in fiber with low red meat intake and regular physical activity and exercise to help maintain a healthy weight.  He was also recommended to have irregular bowel movements.  The use of fiber supplements can be helpful to promote regularity.  There is a common misconception that you must avoid foods like nuts, seeds, corn or popcorn to prevent diverticulitis, but evidence has demonstrated that this is not true (Laura, 2021).          The 21st Century Cures Act makes medical notes available to patients in the interest of  transparency.  However, please be advised that this is a medical document.  It is intended as a peer to peer communication.  It is written in medical language and may contain abbreviations or verbiage that are technical and unfamiliar.  It may appear blunt or direct.  Medical documents are intended to carry relevant information, facts as evident, and the clinical opinion of the practitioner.          [1] No Known Allergies

## 2024-11-29 NOTE — PATIENT INSTRUCTIONS
Diverticulosis    What is Diverticulosis?  Diverticuli are small, sac like pouches in the colon that are caused from a weakening of the muscle of the colon wall.  Diverticulosis affects both males and females equally and her risk for diverticulosis increases as we age.    How often does diverticulosis of care?  The prevalence of diverticulosis increases as we age.  At age 40 only 20% of the population have diverticulosis whereas at age 60, 60% of the palpation have diverticulosis ([place], 2021)    What complications can happen from diverticulosis?  How often does this happen?  The most well-known complication from diverticulosis is diverticulitis, however, this only occurs in 15 to 25% of patients ([place], 2022).  Diverticulitis occurs when the pockets become infected and inflamed.  You may have diverticulosis and never developed diverticulitis.    What can you do to prevent more diverticulosis and complications?  Once you have diverticulosis there is no known way to eliminate them, you can only potentially prevent more from forming.  Overall, the best recommendation is to lead a healthy lifestyle, eating a diet high in fiber with low red meat intake and regular physical activity and exercise to help maintain a healthy weight.  He was also recommended to have irregular bowel movements.  The use of fiber supplements can be helpful to promote regularity.  There is a common misconception that you must avoid foods like nuts, seeds, corn or popcorn to prevent diverticulitis, but evidence has demonstrated that this is not true (Laura, 2021).

## 2024-12-03 DIAGNOSIS — K21.9 GASTROESOPHAGEAL REFLUX DISEASE WITHOUT ESOPHAGITIS: Primary | ICD-10-CM

## 2024-12-03 LAB
CALPROTECTIN STL-MCNT: 12.4 ΜG/G (ref ?–50)
H PYLORI AG STL QL IA: NEGATIVE

## 2024-12-03 RX ORDER — OMEPRAZOLE 40 MG/1
40 CAPSULE, DELAYED RELEASE ORAL DAILY
Qty: 90 CAPSULE | Refills: 0 | Status: SHIPPED | OUTPATIENT
Start: 2024-12-03 | End: 2025-03-03

## 2024-12-03 NOTE — PROGRESS NOTES
Please relay to pt:(follow up with GI and start PPI)     Titi Mr. Subramanian, for Stool calprotectin is normal meaning unlikely to have inflammatory bowel disease process, and negative H Pylori. Please start omeprazole 40mg daily for the next 2-3 months, and within this time frame please schedule an appointment with gastroenterologist for further evaluation  -Dr. Gloria

## 2024-12-23 DIAGNOSIS — I10 ESSENTIAL HYPERTENSION: ICD-10-CM

## 2024-12-30 RX ORDER — METOPROLOL SUCCINATE 25 MG/1
25 TABLET, EXTENDED RELEASE ORAL
Qty: 90 TABLET | Refills: 3 | OUTPATIENT
Start: 2024-12-30

## 2025-01-02 ENCOUNTER — OFFICE VISIT (OUTPATIENT)
Dept: INTERNAL MEDICINE CLINIC | Facility: CLINIC | Age: 40
End: 2025-01-02

## 2025-01-02 VITALS
HEIGHT: 71 IN | DIASTOLIC BLOOD PRESSURE: 84 MMHG | SYSTOLIC BLOOD PRESSURE: 117 MMHG | HEART RATE: 94 BPM | BODY MASS INDEX: 44.1 KG/M2 | WEIGHT: 315 LBS

## 2025-01-02 DIAGNOSIS — I10 ESSENTIAL HYPERTENSION: ICD-10-CM

## 2025-01-02 DIAGNOSIS — E66.813 CLASS 3 SEVERE OBESITY DUE TO EXCESS CALORIES WITHOUT SERIOUS COMORBIDITY WITH BODY MASS INDEX (BMI) OF 50.0 TO 59.9 IN ADULT (HCC): Primary | ICD-10-CM

## 2025-01-02 DIAGNOSIS — E66.01 CLASS 3 SEVERE OBESITY DUE TO EXCESS CALORIES WITHOUT SERIOUS COMORBIDITY WITH BODY MASS INDEX (BMI) OF 50.0 TO 59.9 IN ADULT (HCC): Primary | ICD-10-CM

## 2025-01-02 PROCEDURE — 99213 OFFICE O/P EST LOW 20 MIN: CPT | Performed by: INTERNAL MEDICINE

## 2025-01-02 RX ORDER — TIRZEPATIDE 10 MG/.5ML
10 INJECTION, SOLUTION SUBCUTANEOUS WEEKLY
Qty: 2 ML | Refills: 3 | Status: SHIPPED | OUTPATIENT
Start: 2025-01-02 | End: 2025-01-24

## 2025-01-02 NOTE — PROGRESS NOTES
Robert Subramanian is a 39 year old male.  Chief Complaint   Patient presents with    Weight Management     HPI:   Patient presented today for follow-up.  He has been tolerating 7.5 mg of Zepbound.  He states that he had an episode of stomach bug with diarrhea nausea and vomiting therefore he stopped his Depok for couple of weeks during that time but the symptoms are much better now and he has restarted his medication.  Denies any complaints  His physical activity is daily walks.  And stays busy with his kids.  Has been trying to improve his diet by avoiding high carb food has been eating more fiber and protein.  Has been improving his hydration by drinking more fluids.   Walking every day        Current Outpatient Medications   Medication Sig Dispense Refill    Tirzepatide-Weight Management (ZEPBOUND) 10 MG/0.5ML Subcutaneous Solution Auto-injector Inject 10 mg into the skin once a week for 4 doses. 2 mL 3    metoprolol succinate ER 25 MG Oral Tablet 24 Hr Take 1 tablet (25 mg total) by mouth Daily Beta Blocker. 90 tablet 3    Omeprazole 40 MG Oral Capsule Delayed Release Take 1 capsule (40 mg total) by mouth daily. (Patient not taking: Reported on 1/2/2025) 90 capsule 0    ondansetron 4 MG Oral Tablet Dispersible Take 1 tablet (4 mg total) by mouth every 8 (eight) hours as needed for Nausea. (Patient not taking: Reported on 11/29/2024) 6 tablet 0      Past Medical History:    Essential hypertension    Hyperlipidemia    Visual impairment    glasses      Past Surgical History:   Procedure Laterality Date    Other surgical history  12/19/2019    excision chest wall cyst X 2       Social History:  Social History     Socioeconomic History    Marital status:    Tobacco Use    Smoking status: Former    Smokeless tobacco: Never   Vaping Use    Vaping status: Never Used   Substance and Sexual Activity    Alcohol use: Yes     Comment: seldom use    Drug use: Never     Social Drivers of Health     Financial  Resource Strain: Low Risk  (3/29/2024)    Financial Resource Strain     Difficulty of Paying Living Expenses: Not very hard     Med Affordability: No   Food Insecurity: No Food Insecurity (3/26/2024)    Food Insecurity     Food Insecurity: Never true   Transportation Needs: No Transportation Needs (3/29/2024)    Transportation Needs     Lack of Transportation: No   Housing Stability: Low Risk  (3/26/2024)    Housing Stability     Housing Instability: No      Family History   Problem Relation Age of Onset    Diabetes Mother     Diabetes Father     Other (colorectal cancer) Maternal Cousin       Allergies[1]     REVIEW OF SYSTEMS:   Review of Systems   Review of Systems   Constitutional: Negative for activity change, appetite change and fever.   HENT: Negative for congestion and voice change.    Respiratory: Negative for cough and shortness of breath.    Cardiovascular: Negative for chest pain.   Gastrointestinal: Negative for abdominal distention, abdominal pain and vomiting.   Genitourinary: Negative for hematuria.   Skin: Negative for wound.   Psychiatric/Behavioral: Negative for behavioral problems.   Wt Readings from Last 5 Encounters:   01/02/25 (!) 351 lb (159.2 kg)   11/29/24 (!) 363 lb (164.7 kg)   11/23/24 (!) 364 lb (165.1 kg)   10/24/24 (!) 368 lb (166.9 kg)   10/18/24 (!) 373 lb (169.2 kg)     Body mass index is 48.95 kg/m².      EXAM:   /84   Pulse 94   Ht 5' 11\" (1.803 m)   Wt (!) 351 lb (159.2 kg)   BMI 48.95 kg/m²   Physical Exam   Constitutional:       Appearance: Normal appearance.   HENT:      Head: Normocephalic.   Eyes:      Conjunctiva/sclera: Conjunctivae normal.   Cardiovascular:      Rate and Rhythm: Normal rate and regular rhythm.      Heart sounds: Normal heart sounds. No murmur heard.  Pulmonary:      Effort: Pulmonary effort is normal.      Breath sounds: Normal breath sounds. No rhonchi or rales.   Abdominal:      General: Bowel sounds are normal.      Palpations: Abdomen is  soft.      Tenderness: There is no abdominal tenderness.   Musculoskeletal:      Cervical back: Neck supple.      Right lower leg: No edema.      Left lower leg: No edema.   Skin:     General: Skin is warm and dry.   Neurological:      General: No focal deficit present.      Mental Status: He is alert and oriented to person, place, and time. Mental status is at baseline.   Psychiatric:         Mood and Affect: Mood normal.         Behavior: Behavior normal.       ASSESSMENT AND PLAN:   1. Class 3 severe obesity due to excess calories without serious comorbidity with body mass index (BMI) of 50.0 to 59.9 in adult (HCC)  Nutritional Goals  Limit carbohydrates to <150 gms per day and Eat 3-4 cups of fresh fruits or vegetables daily     Behavior Modifications Reviewed and Discussed  Plan meals in advance, Read nutrition labels, Drink 64 oz of water per day, Utlize portion control strategies to reduce calorie intake, and Identify triggers for eating and manage cues     Exercise Goals Reviewed and Discussed    Increase consistency and time of walking     - increase zepbound to 10 mg and continue for three months  - reeval in three months    2. Essential hypertension  - well controlled      The patient indicates understanding of these issues and agrees to the plan.  Follow up in 3 months    Ale Ocampo MD        [1] No Known Allergies

## 2025-02-17 ENCOUNTER — HOSPITAL ENCOUNTER (OUTPATIENT)
Age: 40
Discharge: HOME | End: 2025-02-17
Payer: COMMERCIAL

## 2025-02-17 VITALS
OXYGEN SATURATION: 94 % | HEART RATE: 84 BPM | DIASTOLIC BLOOD PRESSURE: 87 MMHG | SYSTOLIC BLOOD PRESSURE: 151 MMHG | RESPIRATION RATE: 18 BRPM | TEMPERATURE: 99 F

## 2025-02-17 DIAGNOSIS — H60.91 OTITIS EXTERNA OF RIGHT EAR, UNSPECIFIED CHRONICITY, UNSPECIFIED TYPE: Primary | ICD-10-CM

## 2025-02-17 PROCEDURE — 99213 OFFICE O/P EST LOW 20 MIN: CPT

## 2025-02-17 RX ORDER — CEPHALEXIN 500 MG/1
500 CAPSULE ORAL 4 TIMES DAILY
Qty: 40 CAPSULE | Refills: 0 | Status: SHIPPED | OUTPATIENT
Start: 2025-02-17 | End: 2025-02-27

## 2025-02-17 RX ORDER — OFLOXACIN 3 MG/ML
SOLUTION AURICULAR (OTIC) 2 TIMES DAILY
Qty: 10 ML | Refills: 0 | Status: SHIPPED | OUTPATIENT
Start: 2025-02-17 | End: 2025-02-24

## 2025-02-17 NOTE — ED PROVIDER NOTES
Patient Seen in: [facility] [place]      History     Chief Complaint   Patient presents with    Ear Problem     Stated Complaint: Ear Problem Pain    Subjective:   HPI    39-year-old male presents with right ear pain and decreased hearing.      Objective:     Past Medical History:    Essential hypertension    Hyperlipidemia    Visual impairment    glasses              Past Surgical History:   Procedure Laterality Date    Other surgical history  12/19/2019    excision chest wall cyst X 2                 Social History     Socioeconomic History    Marital status:    Tobacco Use    Smoking status: Former    Smokeless tobacco: Never   Vaping Use    Vaping status: Never Used   Substance and Sexual Activity    Alcohol use: Yes     Comment: seldom use    Drug use: Never     Social Drivers of Health     Food Insecurity: No Food Insecurity (3/26/2024)    Food Insecurity     Food Insecurity: Never true   Transportation Needs: No Transportation Needs (3/29/2024)    Transportation Needs     Lack of Transportation: No   Housing Stability: Low Risk  (3/26/2024)    Housing Stability     Housing Instability: No              Review of Systems    Positive for stated complaint: Ear Problem Pain  Other systems are as noted in HPI.  Constitutional and vital signs reviewed.      All other systems reviewed and negative except as noted above.    Physical Exam     ED Triage Vitals [02/17/25 1308]   /87   Pulse 84   Resp 18   Temp 99 °F (37.2 °C)   Temp src Temporal   SpO2 94 %   O2 Device None (Room air)       Current Vitals:   Vital Signs  BP: 151/87  Pulse: 84  Resp: 18  Temp: 99 °F (37.2 °C)  Temp src: Temporal    Oxygen Therapy  SpO2: 94 %  O2 Device: None (Room air)        Physical Exam  Vitals reviewed.   Constitutional:       General: He is not in acute distress.  HENT:      Left Ear: Tympanic membrane, ear canal and external ear normal.      Ears:      Comments: + pain with movement of R outer ear.  + swelling of  external canal   Unable to visualize  neg drainage       Nose: Nose normal.      Mouth/Throat:      Mouth: Mucous membranes are moist.   Cardiovascular:      Rate and Rhythm: Normal rate and regular rhythm.   Pulmonary:      Effort: Pulmonary effort is normal.      Breath sounds: Normal breath sounds.   Skin:     General: Skin is warm and dry.   Neurological:      General: No focal deficit present.      Mental Status: He is alert and oriented to person, place, and time.   Psychiatric:         Mood and Affect: Mood normal.         Behavior: Behavior normal.             ED Course   Labs Reviewed - No data to display                MDM              Medical Decision Making  39-year-old male presents with right ear pain.  Differential diagnosis includes otitis media, otitis externa, facial cellulitis, mastoiditis.  Patient complains of pain and decreased hearing from his right ear.  There is pain with movement of right outer ear.  The right external canal is swollen closed and unable to visualize the canal or the TM.  A wick was inserted and moistened with normal saline solution.  Patient was given a prescription for antibiotic eardrops as well as antibiotics by mouth.  His right outer ear is also red and mildly swollen.  Mastoid is nontender and is not swollen.    Risk  OTC drugs.  Prescription drug management.        Disposition and Plan     Clinical Impression:  1. Otitis externa of right ear, unspecified chronicity, unspecified type         Disposition:  Discharge  2/17/2025  1:32 pm    Follow-up:  Ale Ocampo MD  [address] [place] [id number] [phone]      If symptoms worsen          Medications Prescribed:  Discharge Medication List as of 2/17/2025  1:32 PM        START taking these medications    Details   ofloxacin 0.3 % Otic Solution Place 5-10 drops into the right ear 2 (two) times daily for 7 days., Normal, Disp-10 mL, R-0      cephALEXin 500 MG Oral Cap Take 1 capsule (500 mg total)  by mouth 4 (four) times daily for 10 days., Normal, Disp-40 capsule, R-0                 Supplementary Documentation:

## 2025-02-17 NOTE — ED INITIAL ASSESSMENT (HPI)
Presents with pain and swelling to right ear for 5 days. No trauma. No fever. No drainage. Hx of keloids.

## 2025-05-26 ENCOUNTER — HOSPITAL ENCOUNTER (OUTPATIENT)
Age: 40
Discharge: HOME | End: 2025-05-26
Payer: COMMERCIAL

## 2025-05-26 VITALS
RESPIRATION RATE: 18 BRPM | HEART RATE: 79 BPM | SYSTOLIC BLOOD PRESSURE: 151 MMHG | OXYGEN SATURATION: 96 % | TEMPERATURE: 99 F | DIASTOLIC BLOOD PRESSURE: 88 MMHG

## 2025-05-26 DIAGNOSIS — L08.9 SKIN INFECTION: Primary | ICD-10-CM

## 2025-05-26 DIAGNOSIS — L91.0 KELOID: ICD-10-CM

## 2025-05-26 PROCEDURE — 99214 OFFICE O/P EST MOD 30 MIN: CPT

## 2025-05-26 PROCEDURE — 99213 OFFICE O/P EST LOW 20 MIN: CPT

## 2025-05-26 RX ORDER — MUPIROCIN 20 MG/G
1 OINTMENT TOPICAL 3 TIMES DAILY
Qty: 1 EACH | Refills: 0 | Status: SHIPPED | OUTPATIENT
Start: 2025-05-26 | End: 2025-06-09

## 2025-05-26 RX ORDER — DOXYCYCLINE 100 MG/1
100 CAPSULE ORAL 2 TIMES DAILY
Qty: 14 CAPSULE | Refills: 0 | Status: SHIPPED | OUTPATIENT
Start: 2025-05-26 | End: 2025-06-02

## 2025-05-26 NOTE — ED INITIAL ASSESSMENT (HPI)
Pt complaining of about re occurrent neck keloids that are leaking and painful. Last follow up with plastic surgery in jan. No fever.

## 2025-05-26 NOTE — DISCHARGE INSTRUCTIONS
Complete entire course of oral antibiotic as directed  Apply topical antibiotic as directed  Apply warm compress to wounds as tolerated  Alternate Tylenol/Motrin as needed for pain   Follow-up with plastic surgery

## 2025-05-26 NOTE — ED PROVIDER NOTES
Chief Complaint   Patient presents with    Skin Problem       History obtained from: patient   services not used     HPI:     Robert Subramanian is a 39 year old male with PMH of hypertension, pre-diabetes, and recurrent keloids who presents with pain and drainage to keloids x few weeks. Patient has recurrent keloids to neck and chest that over the past few weeks have become tender with drainage of pus. Patient has seen plastic surgery several times in the past for keloid injections and removal, last visit January 2025. Patient states he is otherwise feeling well. Denies fevers, chills, sore throat, headache, vomiting, difficulty swallowing.     PMH  Past Medical History[1]    PFSH    PFSH asessment screens reviewed and agree.  Nurses notes reviewed I agree with documentation.    Family History[2]  Family history reviewed with patient/caregiver and is not pertinent to presenting problem.  Social History     Socioeconomic History    Marital status:      Spouse name: Not on file    Number of children: Not on file    Years of education: Not on file    Highest education level: Not on file   Occupational History    Not on file   Tobacco Use    Smoking status: Former    Smokeless tobacco: Never   Vaping Use    Vaping status: Never Used   Substance and Sexual Activity    Alcohol use: Yes     Comment: seldom use    Drug use: Never    Sexual activity: Not on file   Other Topics Concern    Not on file   Social History Narrative    Not on file     Social Drivers of Health     Food Insecurity: No Food Insecurity (3/26/2024)    Food Insecurity     Food Insecurity: Never true   Transportation Needs: No Transportation Needs (3/29/2024)    Transportation Needs     Lack of Transportation: No   Housing Stability: Low Risk  (3/26/2024)    Housing Stability     Housing Instability: No     Housing Instability Emergency: Not on file     Crib or Bassclaustte: Not on file         ROS:   Positive for stated complaint:  neck and chest mass   Other systems are as noted in HPI.   All other systems reviewed and negative except as noted above.    Physical Exam:   Vital signs and nursing note reviewed.       /88   Pulse 79   Temp 98.6 °F (37 °C) (Oral)   Resp 18   SpO2 96%     GENERAL: well developed, no acute distress, non-toxic appearing   SKIN: good skin turgor, large tender keloid with scant purulent drainage to right submandibular region, moderate non-tender keloid to left submandibular region, large tender keloid to anterior chest wall, no fluctuance, no bleeding, no surrounding erythema   HEAD: normocephalic, atraumatic  EYES: sclera non-icteric bilaterally, conjunctiva clear bilaterally  OROPHARYNX: MMM, pharynx clear, maintaining airway and secretions  NECK: supple, no lymphadenopathy, no nuchal rigidity, no trismus, no edema, phonation normal    CARDIO: RRR, normal heart sounds   LUNGS: clear to auscultation bilaterally, no increased WOB  EXTREMITIES: no cyanosis or edema, DALE without difficulty  NEURO: no focal deficits  PSYCH: alert and oriented x3, answering questions appropriately, mood appropriate    MDM/Assessment/Plan:   Orders for this encounter:    Orders Placed This Encounter    doxycycline 100 MG Oral Cap     Sig: Take 1 capsule (100 mg total) by mouth 2 (two) times daily for 7 days.     Dispense:  14 capsule     Refill:  0    mupirocin 2 % External Ointment     Sig: Apply 1 Application topically 3 (three) times daily for 14 days.     Dispense:  1 each     Refill:  0       Labs performed this visit:  No results found for this or any previous visit (from the past 10 hours).    Imaging performed this visit:  No orders to display       Medical Decision Making  DDx includes keloid versus abscess versus cellulitis versus other.  Patient is overall well-appearing with stable vitals presenting with pain and drainage to chronic recurrent keloids times few weeks.  No signs or symptoms of systemic illness.  Rx  doxycycline and topical mupirocin ointment for suspected infected keloids.  Discussed supportive care including rest, warm compress, and OTC Tylenol/Motrin as needed for pain.  Instructed patient to go directly to nearest ER with any worsening or concerning symptoms.  Follow-up with plastic surgeon.    Amount and/or Complexity of Data Reviewed  External Data Reviewed: notes.    Risk  OTC drugs.  Prescription drug management.          Diagnosis:    ICD-10-CM    1. Skin infection  L08.9       2. Keloid  L91.0           All results reviewed and discussed with patient/patient's family. Patient/patient's family verbalize excellent understanding of instructions and feels comfortable with plan. All of patient's/patient's family's questions were addressed.   See AVS for detailed discharge instructions for your condition today.    Follow Up with:  Vazquez Blankenship  [address] [facility] [id number]  [facility] [id number] [phone]      [facility]      Note: This document was dictated using Dragon medical dictation software.  Proofreading was performed to the best of my ability, but errors may be present.    Leah Rojas PA-C       [1]   Past Medical History:   Essential hypertension    Hyperlipidemia    Visual impairment    glasses   [2]   Family History  Problem Relation Age of Onset    Diabetes Mother     Diabetes Father     Other (colorectal cancer) Maternal Cousin

## 2025-06-17 ENCOUNTER — HOSPITAL ENCOUNTER (EMERGENCY)
Facility: HOSPITAL | Age: 40
Discharge: HOME | End: 2025-06-18
Attending: EMERGENCY MEDICINE
Payer: COMMERCIAL

## 2025-06-17 DIAGNOSIS — G43.801 OTHER MIGRAINE WITH STATUS MIGRAINOSUS, NOT INTRACTABLE: Primary | ICD-10-CM

## 2025-06-17 LAB
ALBUMIN SERPL-MCNC: 4.4 G/DL (ref 3.2–4.8)
ALP LIVER SERPL-CCNC: 101 U/L (ref 45–117)
ALT SERPL-CCNC: 24 U/L (ref 10–49)
ANION GAP SERPL CALC-SCNC: 7 MMOL/L (ref 0–18)
AST SERPL-CCNC: 22 U/L (ref ?–34)
BASOPHILS # BLD AUTO: 0.1 X10(3) UL (ref 0–0.2)
BASOPHILS NFR BLD AUTO: 1 %
BILIRUB DIRECT SERPL-MCNC: 0.1 MG/DL (ref ?–0.3)
BILIRUB SERPL-MCNC: 0.4 MG/DL (ref 0.3–1.2)
BUN BLD-MCNC: 11 MG/DL (ref 9–23)
BUN/CREAT SERPL: 11.3 (ref 10–20)
CALCIUM BLD-MCNC: 8.8 MG/DL (ref 8.7–10.4)
CHLORIDE SERPL-SCNC: 108 MMOL/L (ref 98–112)
CO2 SERPL-SCNC: 26 MMOL/L (ref 21–32)
CREAT BLD-MCNC: 0.97 MG/DL (ref 0.7–1.3)
DEPRECATED RDW RBC AUTO: 45.7 FL (ref 35.1–46.3)
EGFRCR SERPLBLD CKD-EPI 2021: 101 ML/MIN/1.73M2 (ref 60–?)
EOSINOPHIL # BLD AUTO: 0.63 X10(3) UL (ref 0–0.7)
EOSINOPHIL NFR BLD AUTO: 6 %
ERYTHROCYTE [DISTWIDTH] IN BLOOD BY AUTOMATED COUNT: 14.5 % (ref 11–15)
GLUCOSE BLD-MCNC: 102 MG/DL (ref 70–99)
GLUCOSE BLDC GLUCOMTR-MCNC: 87 MG/DL (ref 70–99)
HCT VFR BLD AUTO: 43.4 % (ref 39–53)
HGB BLD-MCNC: 14.1 G/DL (ref 13–17.5)
IMM GRANULOCYTES # BLD AUTO: 0.03 X10(3) UL (ref 0–1)
IMM GRANULOCYTES NFR BLD: 0.3 %
LYMPHOCYTES # BLD AUTO: 3.03 X10(3) UL (ref 1–4)
LYMPHOCYTES NFR BLD AUTO: 28.8 %
MCH RBC QN AUTO: 27.9 PG (ref 26–34)
MCHC RBC AUTO-ENTMCNC: 32.5 G/DL (ref 31–37)
MCV RBC AUTO: 85.8 FL (ref 80–100)
MONOCYTES # BLD AUTO: 0.62 X10(3) UL (ref 0.1–1)
MONOCYTES NFR BLD AUTO: 5.9 %
NEUTROPHILS # BLD AUTO: 6.1 X10 (3) UL (ref 1.5–7.7)
NEUTROPHILS # BLD AUTO: 6.1 X10(3) UL (ref 1.5–7.7)
NEUTROPHILS NFR BLD AUTO: 58 %
OSMOLALITY SERPL CALC.SUM OF ELEC: 292 MOSM/KG (ref 275–295)
PLATELET # BLD AUTO: 320 10(3)UL (ref 150–450)
POTASSIUM SERPL-SCNC: 3.5 MMOL/L (ref 3.5–5.1)
PROT SERPL-MCNC: 6.8 G/DL (ref 5.7–8.2)
RBC # BLD AUTO: 5.06 X10(6)UL (ref 4.3–5.7)
SODIUM SERPL-SCNC: 141 MMOL/L (ref 136–145)
WBC # BLD AUTO: 10.5 X10(3) UL (ref 4–11)

## 2025-06-17 PROCEDURE — 82962 GLUCOSE BLOOD TEST: CPT

## 2025-06-17 PROCEDURE — 85025 COMPLETE CBC W/AUTO DIFF WBC: CPT | Performed by: EMERGENCY MEDICINE

## 2025-06-17 PROCEDURE — 96361 HYDRATE IV INFUSION ADD-ON: CPT

## 2025-06-17 PROCEDURE — 99284 EMERGENCY DEPT VISIT MOD MDM: CPT

## 2025-06-17 PROCEDURE — 80048 BASIC METABOLIC PNL TOTAL CA: CPT | Performed by: EMERGENCY MEDICINE

## 2025-06-17 PROCEDURE — 80076 HEPATIC FUNCTION PANEL: CPT | Performed by: EMERGENCY MEDICINE

## 2025-06-17 PROCEDURE — 96375 TX/PRO/DX INJ NEW DRUG ADDON: CPT

## 2025-06-17 PROCEDURE — 99285 EMERGENCY DEPT VISIT HI MDM: CPT

## 2025-06-17 RX ORDER — METOPROLOL TARTRATE 1 MG/ML
5 INJECTION, SOLUTION INTRAVENOUS ONCE
Status: COMPLETED | OUTPATIENT
Start: 2025-06-17 | End: 2025-06-17

## 2025-06-17 RX ORDER — KETOROLAC TROMETHAMINE 15 MG/ML
15 INJECTION, SOLUTION INTRAMUSCULAR; INTRAVENOUS ONCE
Status: COMPLETED | OUTPATIENT
Start: 2025-06-17 | End: 2025-06-17

## 2025-06-17 RX ORDER — DIPHENHYDRAMINE HYDROCHLORIDE 50 MG/ML
25 INJECTION, SOLUTION INTRAMUSCULAR; INTRAVENOUS ONCE
Status: COMPLETED | OUTPATIENT
Start: 2025-06-17 | End: 2025-06-17

## 2025-06-17 RX ORDER — METOCLOPRAMIDE HYDROCHLORIDE 5 MG/ML
10 INJECTION INTRAMUSCULAR; INTRAVENOUS ONCE
Status: COMPLETED | OUTPATIENT
Start: 2025-06-17 | End: 2025-06-17

## 2025-06-18 VITALS
WEIGHT: 315 LBS | BODY MASS INDEX: 44.1 KG/M2 | OXYGEN SATURATION: 91 % | HEART RATE: 68 BPM | SYSTOLIC BLOOD PRESSURE: 131 MMHG | DIASTOLIC BLOOD PRESSURE: 88 MMHG | HEIGHT: 71 IN | RESPIRATION RATE: 18 BRPM | TEMPERATURE: 98 F

## 2025-06-18 PROCEDURE — 96374 THER/PROPH/DIAG INJ IV PUSH: CPT

## 2025-06-18 RX ORDER — MORPHINE SULFATE 4 MG/ML
6 INJECTION, SOLUTION INTRAMUSCULAR; INTRAVENOUS ONCE
Status: COMPLETED | OUTPATIENT
Start: 2025-06-18 | End: 2025-06-18

## 2025-06-18 NOTE — ED QUICK NOTES
Rounding Completed    Plan of Care reviewed. Waiting for iv fluids to complete.  Elimination needs assessed.    Patient is resting comfortably in stretcher.    Bed is locked and in lowest position. Call light within reach.

## 2025-06-18 NOTE — ED PROVIDER NOTES
Patient Seen in: [facility] Emergency [facility]    History     Chief Complaint   Patient presents with    Headache           Eye Visual Problem              HPI    39-year-old male presents to the ER for evaluation of headache, sweats, blurry vision.  Patient has had on and off headaches for the last 3 to 4 days.  He states he is also felt like he has some urinary frequency.  He states the headache is currently 7 out of 10.  His family who is at bedside states that he has not taken his blood pressure medicine in the past 3 weeks and did not take his tours appetite this last week    History from Independent Source: Family gave additional history as stated in [facility]    External Records Reviewed: Patient last saw his primary care doctor in January 2025.  He was tolerating his appetite well and his dose was increased from 7.5 to 10 mg.  His blood pressure was well-controlled at that time and he had a reading in clinic of 117/84    History reviewed. Past Medical History[1]    History reviewed. Past Surgical History[2]      Medications :  Prescriptions Prior to Admission[3]     Family History[4]    Smoking Status: Social Hx on file[5]    Constitutional and vital signs reviewed.      Social History and Family History elements reviewed from today, pertinent positives to the presenting problem noted.    Physical Exam     ED Triage Vitals [06/17/25 2205]   BP (!) 147/107   Pulse 88   Resp 18   Temp 98.3 °F (36.8 °C)   Temp src Oral   SpO2 96 %   O2 Device None (Room air)       Physical Exam   Constitutional: AAOx3, well nourished, NAD  HEENT: Normocephalic, PERRLA, MMM  CV: s1s2+, RRR, no m/r/g, normal distal pulses  Pulmonary/Chest: CTA b/l with no rales, wheezes.  No chest wall tenderness  Abdominal: Nontender.  Nondistended. Soft. Bowel sounds are normal.   Neck/Back:   :   Musculoskeletal: Normal range of motion. No deformity.   Neurological: Awake, alert. Normal reflexes. No cranial nerve deficit.    Skin: Skin is  warm and dry. No rash noted. No erythema.   Psychiatric:      All measures to prevent infection transmission during my interaction with the patient were taken. The patient was already wearing a droplet mask on my arrival to the room. Personal protective equipment was worn throughout the duration of the exam.      ED Course        Labs Reviewed   BASIC METABOLIC PANEL (8) - Abnormal; Notable for the following components:       Result Value    Glucose 102 (*)     All other components within normal limits   HEPATIC FUNCTION PANEL (7) - Normal   POCT GLUCOSE - Normal   CBC WITH DIFFERENTIAL WITH PLATELET     My Independent Interpretation of EKG (if performed):     Monitor Interpretation:   normal sinus rhythm as interpreted by me.      Imaging Results Available and Reviewed while in ED: No results found.  ED Medications Administered:   Medications   ketorolac (Toradol) 15 MG/ML injection 15 mg (15 mg Intravenous Given 6/17/25 2240)   metoclopramide (Reglan) 5 mg/mL injection 10 mg (10 mg Intravenous Given 6/17/25 2246)   diphenhydrAMINE (Benadryl) 50 mg/mL  injection 25 mg (25 mg Intravenous Given 6/17/25 2242)   sodium chloride 0.9 % IV bolus 1,000 mL (0 mL Intravenous Stopped 6/17/25 2335)   metoprolol (Lopressor) 5 mg/5mL injection 5 mg (5 mg Intravenous Given 6/17/25 2233)   morphINE PF 4 MG/ML injection 6 mg (6 mg Intravenous Given 6/18/25 0010)             MDM     Vitals:    06/17/25 2300 06/17/25 2315 06/17/25 2330 06/18/25 0000   BP: (!) 141/92  (!) 147/106 131/88   Pulse: 75 79 77 68   Resp: 21 20 20 18   Temp:       TempSrc:       SpO2: 92% 92% 91%    Weight:       Height:         *I personally reviewed and interpreted all ED vitals.    Independent Interpretation of Studies:     Social Determinants of Health:     Procedures:      Differential/MDM/Shared Decision Making: Differential Diagnosis includes migraine headache, viral syndrome, hypertensive crisis, dehydration, electrolyte abnormality, sinusitis,  meningitis, others.      The patient already  has a past medical history of Essential hypertension, Hyperlipidemia, and Visual impairment.  to contribute to the complexity of this ED evaluation.           Medications, Diagnostics, or Disposition considered but not done:     Patient's lab workup is relatively unremarkable.  His vitals have normalized.  Patient received Reglan, Benadryl, Toradol, morphine and pain is better.  Management of case was discussed with patient and he is comfortable discharge plan.      Condition upon leaving the department: Stable    Disposition and Plan     Clinical Impression:  1. Other migraine with status migrainosus, not intractable        Disposition:  Discharge    Follow-up:  Ale Ocampo MD  [address] [place] [address]  [phone]    Call in 2 day(s)        Medications Prescribed:  Current Discharge Medication List                   [1]   Past Medical History:   Essential hypertension    Hyperlipidemia    Visual impairment    glasses   [2]   Past Surgical History:  Procedure Laterality Date    Other surgical history  12/19/2019    excision chest wall cyst X 2    [3] (Not in a hospital admission)   [4]   Family History  Problem Relation Age of Onset    Diabetes Mother     Diabetes Father     Other (colorectal cancer) Maternal Cousin    [5]   Social History  Socioeconomic History    Marital status:    Tobacco Use    Smoking status: Former    Smokeless tobacco: Never   Vaping Use    Vaping status: Never Used   Substance and Sexual Activity    Alcohol use: Yes     Comment: seldom use    Drug use: Never

## 2025-06-18 NOTE — ED INITIAL ASSESSMENT (HPI)
Patient to the ED from home d/t HA and blurry vision. Patient states HA for the last 3-4 days, no relief with medications. Blurry vision began at 1900. Patient prediabetic.

## 2025-08-13 ENCOUNTER — OFFICE VISIT (OUTPATIENT)
Dept: INTERNAL MEDICINE CLINIC | Facility: CLINIC | Age: 40
End: 2025-08-13

## 2025-08-13 VITALS
DIASTOLIC BLOOD PRESSURE: 79 MMHG | BODY MASS INDEX: 44.1 KG/M2 | WEIGHT: 315 LBS | HEIGHT: 71 IN | HEART RATE: 90 BPM | SYSTOLIC BLOOD PRESSURE: 116 MMHG

## 2025-08-13 DIAGNOSIS — E66.813 CLASS 3 SEVERE OBESITY DUE TO EXCESS CALORIES WITHOUT SERIOUS COMORBIDITY WITH BODY MASS INDEX (BMI) OF 50.0 TO 59.9 IN ADULT: Primary | ICD-10-CM

## 2025-08-13 DIAGNOSIS — L91.0 KELOID OF SKIN: ICD-10-CM

## 2025-08-13 DIAGNOSIS — I10 ESSENTIAL HYPERTENSION: ICD-10-CM

## 2025-08-13 PROCEDURE — 99213 OFFICE O/P EST LOW 20 MIN: CPT | Performed by: INTERNAL MEDICINE

## 2025-08-13 RX ORDER — TIRZEPATIDE 10 MG/.5ML
INJECTION, SOLUTION SUBCUTANEOUS
COMMUNITY
Start: 2025-06-29 | End: 2025-08-13

## 2025-08-13 RX ORDER — TIRZEPATIDE 12.5 MG/.5ML
12.5 INJECTION, SOLUTION SUBCUTANEOUS WEEKLY
Qty: 2 ML | Refills: 0 | Status: SHIPPED | OUTPATIENT
Start: 2025-08-13 | End: 2025-09-04

## 2025-08-13 RX ORDER — TIRZEPATIDE 15 MG/.5ML
15 INJECTION, SOLUTION SUBCUTANEOUS WEEKLY
Qty: 2 ML | Refills: 3 | Status: SHIPPED | OUTPATIENT
Start: 2025-09-12 | End: 2025-10-04

## 2025-08-14 ENCOUNTER — TELEPHONE (OUTPATIENT)
Dept: INTERNAL MEDICINE CLINIC | Facility: CLINIC | Age: 40
End: 2025-08-14

## (undated) DEVICE — SUTURE ETHILON 3-0 669H

## (undated) DEVICE — SUTURE MONOCRYL 3-0 Y936H

## (undated) DEVICE — 3M™ STERI-STRIP™ REINFORCED ADHESIVE SKIN CLOSURES, R1547, 1/2 IN X 4 IN (12 MM X 100 MM), 6 STRIPS/ENVELOPE: Brand: 3M™ STERI-STRIP™

## (undated) DEVICE — HOVERMATT 34IN SINGLE USE

## (undated) DEVICE — MINOR GENERAL: Brand: MEDLINE INDUSTRIES, INC.

## (undated) DEVICE — DRAPE SHEET LAPAROTOMY

## (undated) DEVICE — SUTURE VICRYL 2-0 SH

## (undated) DEVICE — SUTURE CHROMIC GUT 2-0 SH

## (undated) DEVICE — CAUTERY BLADE 2IN INS E1455

## (undated) DEVICE — SOL  .9 1000ML BTL

## (undated) NOTE — ED AVS SNAPSHOT
Isaac Eleonora   MRN: [id number]    Department:  [facility] Emergency Department   Date of Visit:  4/11/2019           Disclosure     Insurance plans vary and the physician(s) referred by the ER may not be covered by your plan.  Please contac CARE PHYSICIAN AT ONCE OR RETURN IMMEDIATELY TO THE EMERGENCY DEPARTMENT. If you have been prescribed any medication(s), please fill your prescription right away and begin taking the medication(s) as directed.   If you believe that any of the medications

## (undated) NOTE — LETTER
Date & Time: 7/21/2020, 1:26 PM  Patient: Diane Carpio  Encounter Provider(s):    JOVANNY Robbins       To Whom It May Concern:    Leslie Sarabia was seen and treated in our department on 7/21/2020.  He should not return to work until [employer]

## (undated) NOTE — Clinical Note
FYI: TCM outreach completed. Pt will be calling to schedule appt to establish care, was not able to schedule at time of call.

## (undated) NOTE — LETTER
[facility] EMERGENCY DEPARTMENT  [address] [facility] [address]  [phone]     Patient: Robert Subramanian   YOB: 1985   Date of Visit: 1/18/2024     Dear Employer,        January 18, 2024    At [facility], we are taking special precautions and doing everything we can to prevent the spread of COVID-19. During this time, we ask for your assistance regarding physician documentation for employees to return to work following a respiratory illness.     The Centers for Disease Control (CDC) recommends the following:    Ensure that sick leave policies are flexible and consistent with public health guidance, and employees are aware of and understand these policies.   Maintain flexible policies that permit employees to stay home to care for a sick family member or to take care of children due to school and  closures.   Employers should not require a COVID-19 test result or a healthcare provider’s note for sick employees to validate their illness, qualify for sick leave or return to work.   Be aware that healthcare provider offices and medical facilities may be extremely busy and not able to provide documentation in a timely manner. Most people with COVID-19 have mild illness, can recover at home without medical care and can follow CDC recommendations to determine when to discontinue home isolation and return to work.      Individuals with COVID-19 symptoms directed to care for themselves at home should:  Isolate for five days. If individuals are asymptomatic or symptoms are resolving (without fever for 24 hours), isolation may be discontinued on day six. Onset of symptoms is considered day zero.   Follow isolation by five days of mask wearing when around others to minimize the risk of infection.     Individuals infected with SARS-CoV-2 who never develop COVID-19 symptoms:    Day of positive test is considered day zero.   Isolate for five days.  Can discontinue  isolation on day six.   Follow isolation by five days of wearing a mask when around others to minimize the risk of infection.    Individuals who are asymptomatic but have been exposed:  For people who are unvaccinated or are more than six months out from their second mRNA dose (or more than two months after the J&J vaccine) and not yet boosted, CDC now recommends quarantine for five days followed by strict, mask use for an additional five days. Alternatively, if a five-day quarantine is not feasible, it is imperative that an exposed person wear a well-fitting mask at all times when around others for 10 days after exposure.   Individuals who have received their booster shot do not need to quarantine following an exposure but should wear a mask for 10 days after the exposure.    Best practice would also include a test on day five after exposure.   If symptoms occur, individuals should immediately quarantine until a negative test confirms symptoms are not attributable to COVID-19.     Please visit the CDC website for further information and details to assist you during this challenging time.     Sincerely,     Randall Beckford MD

## (undated) NOTE — LETTER
Date & Time: 6/7/2022, 5:13 PM  Patient: Bigg Judge  Encounter Provider(s):    Tony Finney MD       To Whom It May Concern:    Chanda Alexis was seen and treated in our department on 6/7/2022. He can return to work on 6/10/22.     If you have any questions or concerns, please do not hesitate to call.        _____________________________  Physician/APC Signature

## (undated) NOTE — ED AVS SNAPSHOT
Jose Henao   MRN: [id number]    Department:  [facility] Emergency Department   Date of Visit:  12/17/2018           Disclosure     Insurance plans vary and the physician(s) referred by the ER may not be covered by your plan.  Please conta CARE PHYSICIAN AT ONCE OR RETURN IMMEDIATELY TO THE EMERGENCY DEPARTMENT. If you have been prescribed any medication(s), please fill your prescription right away and begin taking the medication(s) as directed.   If you believe that any of the medications

## (undated) NOTE — LETTER
Date & Time: 4/26/2021, 10:10 AM  Patient: Fannie Oneil      To Whom It May Concern:    Gilbert Cook was seen and treated in our department on 4/26/2021. He should not return to work until 5/3/2021.     If you have any questions or concerns,

## (undated) NOTE — LETTER
Date & Time: 9/8/2023, 9:58 AM  Patient: Rodríguez Arias  Encounter Provider(s):    Marianela Trimble MD       To Whom It May Concern:    Shanna Azevedo was seen and treated in our department on 9/8/2023. He should not return to work until 9/11/2020 .     If you have any questions or concerns, please do not hesitate to call.        _____________________________  Physician/APC Signature

## (undated) NOTE — LETTER
1/4/2022              Jahaira Reese        [address]        [place] [address]         Dear Yash Bailon,    This letter is to inform you that our office has made several attempts to reach you by phone without success.   We were a

## (undated) NOTE — LETTER
Date & Time: 4/15/2023, 2:22 PM  Patient: Carey Piper  Encounter Provider(s):    JOVANNY Rea       To Whom It May Concern:    Augusto Hooker was seen and treated in our department on 4/15/2023. He should not return to work until 4/16/2023 night shift .     If you have any questions or concerns, please do not hesitate to call.        _____________________________  Physician/APC Signature

## (undated) NOTE — LETTER
02/04/21        [facility]  [address]      Dear Gwendolyn Rocha,    [address] [facility] records indicate that you have outstanding lab work and or testing that was ordered for you and has not yet been completed:   Dayton Loyd

## (undated) NOTE — ED AVS SNAPSHOT
Maria Dolores December   MRN: [id number]    Department:  [facility] Emergency Department   Date of Visit:  2/17/2018           Disclosure     Insurance plans vary and the physician(s) referred by the ER may not be covered by your plan.  Please contac CARE PHYSICIAN AT ONCE OR RETURN IMMEDIATELY TO THE EMERGENCY DEPARTMENT. If you have been prescribed any medication(s), please fill your prescription right away and begin taking the medication(s) as directed.   If you believe that any of the medications

## (undated) NOTE — LETTER
Date & Time: 6/7/2022, 5:09 PM  Patient: Do Snell  Encounter Provider(s):    Criselda Vital MD       To Whom It May Concern:    Tootie Garnica was seen and treated in our department on 6/7/2022. He can return to work on 6/9/22.     If you have any questions or concerns, please do not hesitate to call.        _____________________________  Physician/APC Signature

## (undated) NOTE — LETTER
Date & Time: 6/6/2023, 6:35 PM  Patient: Jen Trinidad  Encounter Provider(s):    Gregoria Covington MD       To Whom It May Concern:    Dez Burks was seen and treated in our department on 6/6/2023. He should not return to work until 6/9/23 .     If you have any questions or concerns, please do not hesitate to call.        _____________________________  Physician/APC Signature

## (undated) NOTE — LETTER
February 17, 2018    Patient: Jemma Chong   Date of Visit: 2/17/2018       To Whom It May Concern:    Jennie Nguyen was seen and treated in our emergency department on 2/17/2018. He should not return to work until 2/19/18.     If you have any qu

## (undated) NOTE — LETTER
Date & Time: 10/6/2020, 4:35 PM  Patient: Souleymane Gilda  Encounter Provider(s):    Devan Golden MD       To Whom It May Concern:    Yana Bang was seen and treated in our department on 10/6/2020.  He should not return to work until he

## (undated) NOTE — LETTER
[facility] [place] [address]  [address] [facility] [address]  [phone]     Patient: Heather Rivera   YOB: 1985   Date of Visit: 4/8/2021     Dear Employer,        April 8, 2021    At [facility], we are taking special discontinue isolation and other precautions 10 days after the date of their first positive RT-PCR test for SARS-CoV-2 RNA.     Persons who are asymptomatic but have been exposed, CDC recommends 14 days of quarantine after exposure based on the time it takes

## (undated) NOTE — LETTER
No information on file.        Consent for Coronary CT Angiography    Date of Procedure:     * No procedures found * on Robertpablo Delaney Marilynn    Your doctor has recommended that you have a Coronary CT Angiography procedure. Coronary CT Angiography is a diagnostic procedure using computed tomography to scan the chest and coronary arteries. It is a non-invasive technique that allows clear visualization of narrowed and blocked arteries. Blockage in these arteries may lead to heart attack or stroke.    You will lie on a table that slides slowly into a large circular opening called the CT gantry. The procedure will be performed by the CT Staff, including a nurse, a technologist, and a patient assistant. The staff will be with you throughout the entire procedure to explain each step, make you as comfortable as possible, and answer all of your questions. The staff will take a series of images of your heart and chest to help define the area to be imaged. You will be asked to hold your breath for a short time as each series of images is performed and acquired.     You may receive medication called a beta blocker that will slow your hear rate down. This is needed so we can obtain better images of your heart. You may also receive a nitroglycerine spray under your tongue. This medication is given to dilate the arteries for better picture quality. The nitroglycerine may cause a drop in blood pressure. During the procedure you will receive an injection of a contrast material, which assists the physician in highlighting the anatomy of your heart. You may experience a warm sensation through your body and a metallic taste in your mouth. In rare circumstances, a rash and/or hives may occur. It's very important to inform your care giver of any changes you may feel or experience.     The medication and the contrast material used as part of your procedure are all deemed very safe, however there is always an element of risk to a  patient when taking medication. Allergic reactions to medication can range from very minor to very serious, leading to a life threatening situation or even death. Please be sure to communicate any allergy you may have to your caregiver immediately.    The information that is obtained during your testing will be treated as privileged and confidential. The information obtained will be given to your doctor and may be used for insurance purposes or to track and trend data as part of  with your privacy retained.     I, Robert Subramanian, have read and understand the above information. I voluntarily agree and consent to have the Coronary CT Angiography (CTA) Exam. Furthermore, no guarantees or assurances have been given by anyone as to the results that may be obtained from this procedure. Any questions that may have occurred to me have been answered to my satisfaction.    Signature of Patient: __________________________Date: ___________Time: _______      Patient Name: Robert Subramanian    : 1985      Printed: 2024      Medical Record #: [id number]

## (undated) NOTE — ED AVS SNAPSHOT
Mulu Mills   MRN: [id number]    Department:  [facility] Emergency Department   Date of Visit:  12/3/2019           Disclosure     Insurance plans vary and the physician(s) referred by the ER may not be covered by your plan.  Please contac CARE PHYSICIAN AT ONCE OR RETURN IMMEDIATELY TO THE EMERGENCY DEPARTMENT. If you have been prescribed any medication(s), please fill your prescription right away and begin taking the medication(s) as directed.   If you believe that any of the medications

## (undated) NOTE — ED AVS SNAPSHOT
Damaris Mendez   MRN: [id number]    Department:  [facility] Emergency Department   Date of Visit:  8/13/2018           Disclosure     Insurance plans vary and the physician(s) referred by the ER may not be covered by your plan.  Please contac CARE PHYSICIAN AT ONCE OR RETURN IMMEDIATELY TO THE EMERGENCY DEPARTMENT. If you have been prescribed any medication(s), please fill your prescription right away and begin taking the medication(s) as directed.   If you believe that any of the medications

## (undated) NOTE — LETTER
Date & Time: 3/27/2020, 10:52 AM  Patient: Diane Carpio  Encounter Provider(s):    MD Melissa Gonzales APRN       To Whom It May Concern:    Leslie Sarabia was seen and treated in our department on 3/27/2020.  Please excuse fro

## (undated) NOTE — LETTER
[address] [place], [place] [id number]  [phone]     Patient: Brandon Peter   YOB: 1985   Date of Visit: 12/5/2020     Dear Employer,        December 5, 2020    At [facility] Persons infected with SARS-CoV-2 who never develop COVID-19 symptoms may discontinue isolation and other precautions 10 days after the date of their first positive RT-PCR test for SARS-CoV-2 RNA.     Persons who are asymptomatic but have been exposed, CDC r

## (undated) NOTE — LETTER
Date & Time: 4/11/2019, 8:42 PM  Patient: Vilinda Burkitt  Encounter Provider(s):    JOVANNY Castillo       To Whom It May Concern:    Lorrie Pak was seen and treated in our department on 4/11/2019.  He should be excused from work thru 4/12/2

## (undated) NOTE — LETTER
Date & Time: 12/4/2019, 12:02 AM  Patient: Jemma Chong  Encounter Provider(s):    Sender, Briseyda Leonard MD       To Whom It May Concern:    Jennie Nguyen was seen and treated in our department on 12/3/2019. He should not return to work until 12/5/19.

## (undated) NOTE — Clinical Note
Spoke with pt today for TCM--please see notes. Pt confirms 4/12/2024 f/u appt with Dr. Ahumada and heart monitor placement. Sent TE to office staff for appt assist and f/u, as pt prefers to see you, only. Thank you.  Future Appointments 4/26/2024  11:30 AM   Justin Guerra, THERESA    [id number]            [facility]

## (undated) NOTE — LETTER
Date & Time: 6/24/2020, 3:02 PM  Patient: Kamran Browne  Encounter Provider(s):    AVINASH Raines       To Whom It May Concern:    Mahad Cosme was seen and treated in our department on 6/24/2020. He may return to work on 6/29/2020.